# Patient Record
Sex: MALE | Race: OTHER | HISPANIC OR LATINO | URBAN - METROPOLITAN AREA
[De-identification: names, ages, dates, MRNs, and addresses within clinical notes are randomized per-mention and may not be internally consistent; named-entity substitution may affect disease eponyms.]

---

## 2017-06-28 ENCOUNTER — INPATIENT (INPATIENT)
Facility: HOSPITAL | Age: 82
LOS: 6 days | Discharge: HOME | End: 2017-07-05
Attending: SURGERY | Admitting: SURGERY
Payer: MEDICARE

## 2017-06-28 VITALS
SYSTOLIC BLOOD PRESSURE: 123 MMHG | DIASTOLIC BLOOD PRESSURE: 84 MMHG | TEMPERATURE: 98 F | OXYGEN SATURATION: 97 % | RESPIRATION RATE: 16 BRPM | HEART RATE: 61 BPM

## 2017-06-28 DIAGNOSIS — Z98.890 OTHER SPECIFIED POSTPROCEDURAL STATES: Chronic | ICD-10-CM

## 2017-06-28 DIAGNOSIS — K56.69 OTHER INTESTINAL OBSTRUCTION: ICD-10-CM

## 2017-06-28 LAB
ALBUMIN SERPL ELPH-MCNC: 4.8 G/DL — SIGNIFICANT CHANGE UP (ref 3.3–5)
ALP SERPL-CCNC: 53 U/L — SIGNIFICANT CHANGE UP (ref 40–120)
ALT FLD-CCNC: 14 U/L — SIGNIFICANT CHANGE UP (ref 4–41)
APTT BLD: 31.2 SEC — SIGNIFICANT CHANGE UP (ref 27.5–37.4)
AST SERPL-CCNC: 23 U/L — SIGNIFICANT CHANGE UP (ref 4–40)
BASE EXCESS BLDV CALC-SCNC: 5.6 MMOL/L — SIGNIFICANT CHANGE UP
BASE EXCESS BLDV CALC-SCNC: 6.7 MMOL/L — SIGNIFICANT CHANGE UP
BASOPHILS # BLD AUTO: 0.01 K/UL — SIGNIFICANT CHANGE UP (ref 0–0.2)
BASOPHILS NFR BLD AUTO: 0.1 % — SIGNIFICANT CHANGE UP (ref 0–2)
BILIRUB SERPL-MCNC: 1 MG/DL — SIGNIFICANT CHANGE UP (ref 0.2–1.2)
BLD GP AB SCN SERPL QL: NEGATIVE — SIGNIFICANT CHANGE UP
BLOOD GAS VENOUS - CREATININE: 1.07 MG/DL — SIGNIFICANT CHANGE UP (ref 0.5–1.3)
BLOOD GAS VENOUS - CREATININE: 1.11 MG/DL — SIGNIFICANT CHANGE UP (ref 0.5–1.3)
BUN SERPL-MCNC: 44 MG/DL — HIGH (ref 7–23)
CALCIUM SERPL-MCNC: 10.5 MG/DL — SIGNIFICANT CHANGE UP (ref 8.4–10.5)
CHLORIDE BLDV-SCNC: 91 MMOL/L — LOW (ref 96–108)
CHLORIDE BLDV-SCNC: 96 MMOL/L — SIGNIFICANT CHANGE UP (ref 96–108)
CHLORIDE SERPL-SCNC: 86 MMOL/L — LOW (ref 98–107)
CK MB BLD-MCNC: 2.2 — SIGNIFICANT CHANGE UP (ref 0–2.5)
CK MB BLD-MCNC: 5.37 NG/ML — SIGNIFICANT CHANGE UP (ref 1–6.6)
CK SERPL-CCNC: 246 U/L — HIGH (ref 30–200)
CO2 SERPL-SCNC: 26 MMOL/L — SIGNIFICANT CHANGE UP (ref 22–31)
CREAT SERPL-MCNC: 1.49 MG/DL — HIGH (ref 0.5–1.3)
EOSINOPHIL # BLD AUTO: 0 K/UL — SIGNIFICANT CHANGE UP (ref 0–0.5)
EOSINOPHIL NFR BLD AUTO: 0 % — SIGNIFICANT CHANGE UP (ref 0–6)
GAS PNL BLDV: 133 MMOL/L — LOW (ref 136–146)
GAS PNL BLDV: 134 MMOL/L — LOW (ref 136–146)
GLUCOSE BLDV-MCNC: 140 — HIGH (ref 70–99)
GLUCOSE BLDV-MCNC: 153 — HIGH (ref 70–99)
GLUCOSE SERPL-MCNC: 155 MG/DL — HIGH (ref 70–99)
HCO3 BLDV-SCNC: 29 MMOL/L — HIGH (ref 20–27)
HCO3 BLDV-SCNC: 30 MMOL/L — HIGH (ref 20–27)
HCT VFR BLD CALC: 45.3 % — SIGNIFICANT CHANGE UP (ref 39–50)
HCT VFR BLDV CALC: 50.1 % — SIGNIFICANT CHANGE UP (ref 39–51)
HCT VFR BLDV CALC: 54.3 % — HIGH (ref 39–51)
HGB BLD-MCNC: 15.4 G/DL — SIGNIFICANT CHANGE UP (ref 13–17)
HGB BLDV-MCNC: 16.4 G/DL — SIGNIFICANT CHANGE UP (ref 13–17)
HGB BLDV-MCNC: 18.1 G/DL — HIGH (ref 13–17)
IMM GRANULOCYTES # BLD AUTO: 0 # — SIGNIFICANT CHANGE UP
IMM GRANULOCYTES NFR BLD AUTO: 0 % — SIGNIFICANT CHANGE UP (ref 0–1.5)
INR BLD: 1.19 — HIGH (ref 0.88–1.17)
LACTATE BLDV-MCNC: 2.4 MMOL/L — HIGH (ref 0.5–2)
LACTATE BLDV-MCNC: 3.2 MMOL/L — HIGH (ref 0.5–2)
LYMPHOCYTES # BLD AUTO: 1.38 K/UL — SIGNIFICANT CHANGE UP (ref 1–3.3)
LYMPHOCYTES # BLD AUTO: 19.1 % — SIGNIFICANT CHANGE UP (ref 13–44)
MCHC RBC-ENTMCNC: 32.6 PG — SIGNIFICANT CHANGE UP (ref 27–34)
MCHC RBC-ENTMCNC: 34 % — SIGNIFICANT CHANGE UP (ref 32–36)
MCV RBC AUTO: 96 FL — SIGNIFICANT CHANGE UP (ref 80–100)
MONOCYTES # BLD AUTO: 1.39 K/UL — HIGH (ref 0–0.9)
MONOCYTES NFR BLD AUTO: 19.3 % — HIGH (ref 2–14)
NEUTROPHILS # BLD AUTO: 4.44 K/UL — SIGNIFICANT CHANGE UP (ref 1.8–7.4)
NEUTROPHILS NFR BLD AUTO: 61.5 % — SIGNIFICANT CHANGE UP (ref 43–77)
PCO2 BLDV: 44 MMHG — SIGNIFICANT CHANGE UP (ref 41–51)
PCO2 BLDV: 44 MMHG — SIGNIFICANT CHANGE UP (ref 41–51)
PH BLDV: 7.44 PH — HIGH (ref 7.32–7.43)
PH BLDV: 7.46 PH — HIGH (ref 7.32–7.43)
PLATELET # BLD AUTO: 255 K/UL — SIGNIFICANT CHANGE UP (ref 150–400)
PMV BLD: 11.1 FL — SIGNIFICANT CHANGE UP (ref 7–13)
PO2 BLDV: 53 MMHG — HIGH (ref 35–40)
PO2 BLDV: 67 MMHG — HIGH (ref 35–40)
POTASSIUM BLDV-SCNC: 3.5 MMOL/L — SIGNIFICANT CHANGE UP (ref 3.4–4.5)
POTASSIUM BLDV-SCNC: 3.6 MMOL/L — SIGNIFICANT CHANGE UP (ref 3.4–4.5)
POTASSIUM SERPL-MCNC: 3.7 MMOL/L — SIGNIFICANT CHANGE UP (ref 3.5–5.3)
POTASSIUM SERPL-SCNC: 3.7 MMOL/L — SIGNIFICANT CHANGE UP (ref 3.5–5.3)
PROT SERPL-MCNC: 8.7 G/DL — HIGH (ref 6–8.3)
PROTHROM AB SERPL-ACNC: 13.4 SEC — HIGH (ref 9.8–13.1)
RBC # BLD: 4.72 M/UL — SIGNIFICANT CHANGE UP (ref 4.2–5.8)
RBC # FLD: 13.3 % — SIGNIFICANT CHANGE UP (ref 10.3–14.5)
RH IG SCN BLD-IMP: POSITIVE — SIGNIFICANT CHANGE UP
SAO2 % BLDV: 88.3 % — HIGH (ref 60–85)
SAO2 % BLDV: 93.2 % — HIGH (ref 60–85)
SODIUM SERPL-SCNC: 134 MMOL/L — LOW (ref 135–145)
TROPONIN T SERPL-MCNC: < 0.06 NG/ML — SIGNIFICANT CHANGE UP (ref 0–0.06)
WBC # BLD: 7.22 K/UL — SIGNIFICANT CHANGE UP (ref 3.8–10.5)
WBC # FLD AUTO: 7.22 K/UL — SIGNIFICANT CHANGE UP (ref 3.8–10.5)

## 2017-06-28 PROCEDURE — 74177 CT ABD & PELVIS W/CONTRAST: CPT | Mod: 26

## 2017-06-28 PROCEDURE — 71010: CPT | Mod: 26

## 2017-06-28 RX ORDER — SODIUM CHLORIDE 9 MG/ML
1000 INJECTION INTRAMUSCULAR; INTRAVENOUS; SUBCUTANEOUS ONCE
Qty: 0 | Refills: 0 | Status: COMPLETED | OUTPATIENT
Start: 2017-06-28 | End: 2017-06-28

## 2017-06-28 RX ORDER — SODIUM CHLORIDE 9 MG/ML
2000 INJECTION INTRAMUSCULAR; INTRAVENOUS; SUBCUTANEOUS ONCE
Qty: 0 | Refills: 0 | Status: COMPLETED | OUTPATIENT
Start: 2017-06-28 | End: 2017-06-28

## 2017-06-28 RX ORDER — METOCLOPRAMIDE HCL 10 MG
10 TABLET ORAL ONCE
Qty: 0 | Refills: 0 | Status: COMPLETED | OUTPATIENT
Start: 2017-06-28 | End: 2017-06-28

## 2017-06-28 RX ORDER — SODIUM CHLORIDE 9 MG/ML
1000 INJECTION, SOLUTION INTRAVENOUS
Qty: 0 | Refills: 0 | Status: DISCONTINUED | OUTPATIENT
Start: 2017-06-28 | End: 2017-06-30

## 2017-06-28 RX ORDER — HEPARIN SODIUM 5000 [USP'U]/ML
5000 INJECTION INTRAVENOUS; SUBCUTANEOUS EVERY 8 HOURS
Qty: 0 | Refills: 0 | Status: DISCONTINUED | OUTPATIENT
Start: 2017-06-28 | End: 2017-07-05

## 2017-06-28 RX ADMIN — SODIUM CHLORIDE 100 MILLILITER(S): 9 INJECTION, SOLUTION INTRAVENOUS at 18:35

## 2017-06-28 RX ADMIN — SODIUM CHLORIDE 1000 MILLILITER(S): 9 INJECTION INTRAMUSCULAR; INTRAVENOUS; SUBCUTANEOUS at 12:29

## 2017-06-28 RX ADMIN — HEPARIN SODIUM 5000 UNIT(S): 5000 INJECTION INTRAVENOUS; SUBCUTANEOUS at 22:13

## 2017-06-28 RX ADMIN — Medication 10 MILLIGRAM(S): at 11:28

## 2017-06-28 RX ADMIN — SODIUM CHLORIDE 1000 MILLILITER(S): 9 INJECTION INTRAMUSCULAR; INTRAVENOUS; SUBCUTANEOUS at 13:24

## 2017-06-28 NOTE — H&P ADULT - ASSESSMENT
Patient is an 83 year old male with a SBO.  -NPO  -IVFs  -Davis for strict Is and Os  -Placed NGT in ED and 1100 brown fluid drained.  -Serial abdominal exams  -lovenox for VTE px.

## 2017-06-28 NOTE — ED ADULT NURSE NOTE - CHIEF COMPLAINT QUOTE
Pt s/p endoscopy Monday c/o hiccups and vomiting since. Pt reports weakness denies current nausea, denies diarrhea, fever, chest pain, SOB.    Abnormal EKG

## 2017-06-28 NOTE — ED PROVIDER NOTE - ATTENDING CONTRIBUTION TO CARE
awa: hx from pt and wife.  PMh includes barretts esophagus and some type of colon surgery 8 years ago..   routine f/u endoscopy 2 days ago. that night started vomiting. yesterday vomiting green and then brown material. no fever or diarrhea. no sx at present. pt is thirsty but not hungary.   exam: nad. abd unremarkable.   EKG: BBB; old ekg not available for comparison.   recc: hydrate, CT abd/pelvis with oral contrast. labs.

## 2017-06-28 NOTE — ED PROVIDER NOTE - OBJECTIVE STATEMENT
82 y/o male pmh htn, barrets esophagus, pvd c/o hiccups and vomiting x2 days. Pt admits to having an endoscopy x2 days ago and has had hiccups and vomiting since. Pt admit sto greenish vomiting which then progressed to brownish material. Pt admits to minor epigastric pain. Denies chest pain, sob, diaphoresis, diarrhea, dysuria, numbness, tingling, weakness, dizziness, syncope, fever or chills. Pt last x BM 1 day ago 82 y/o male pmh htn, barrets esophagus, pvd c/o hiccups and vomiting x2 days. Pt admits to having an endoscopy x2 days ago and has had hiccups and vomiting since. Pt admit sto greenish vomiting which then progressed to brownish material. Pt admits to minor epigastric pain. Denies chest pain, sob, diaphoresis, diarrhea, dysuria, numbness, tingling, weakness, dizziness, syncope, fever or chills.. Pt last x BM 1 day ago

## 2017-06-28 NOTE — H&P ADULT - NSHPLABSRESULTS_GEN_ALL_CORE
CT A/P:    BOWEL: Status post right hemicolectomy. Markedly distended stomach and   small bowel to the level of a tight transition in the mid abdomen at the   level of umbilicus. Distal small bowel is collapsed. The colon is   collapsed. No pneumatosis.

## 2017-06-28 NOTE — H&P ADULT - HISTORY OF PRESENT ILLNESS
Patient is an 83 year old male with a PMH of Barretts esophagitis, HTN, HLD, and colon CA s/p right hemicolectomy who is presenting with nausea, vomiting, and hiccups since his EGD on Monday.  He had a scheduled EGD with Dr. Hidalgo to evaluate his Barretts on Monday.  Monday evening he started having hiccups and then vomited.  He has been doing both since.  His last BM and flatus were yesterday.  Patient and his family that when he had his last EGD, he had similar symptoms, but they resolved on their own.  His last colonoscopy was approx a year ago and was normal per wife.

## 2017-06-28 NOTE — ED ADULT NURSE NOTE - OBJECTIVE STATEMENT
received pt A&Ox3 in no apparent distress at this time. #20g IVl to L FA, bloods drawn and sent to the lab. no s/s of infiltration noted at this time. family and MD at bedside. pt c/o hiccups at this time. cardiac monitor in place. vss. dispo pending

## 2017-06-28 NOTE — H&P ADULT - NSHPPHYSICALEXAM_GEN_ALL_CORE
PHYSICAL EXAM:    Constitutional:  Patient lying in bed comfortably but hiccupping.    HENMT: NC/AT    Respiratory:  No tachypnea or accessory muscle use.    Cardiovascular:  Regular rate    Gastrointestinal:  Abd is soft, mildly distended, nontender.    Extremities:  Moving all four extremities spontaneously    Neurological:  A&Ox3    Skin:  Midline scar on abd and left inguinal scar.    Lymph Nodes:    Musculoskeletal:    Psychiatric:  Appropriate affect.

## 2017-06-28 NOTE — ED ADULT TRIAGE NOTE - CHIEF COMPLAINT QUOTE
Pt s/p endoscopy Monday c/o hiccups and vomiting since. Pt reports weakness denies current nausea, denies diarrhea, fever, chest pain, SOB. Pt s/p endoscopy Monday c/o hiccups and vomiting since. Pt reports weakness denies current nausea, denies diarrhea, fever, chest pain, SOB.    Abnormal EKG

## 2017-06-29 LAB
ALBUMIN SERPL ELPH-MCNC: 4.3 G/DL — SIGNIFICANT CHANGE UP (ref 3.3–5)
ALP SERPL-CCNC: 45 U/L — SIGNIFICANT CHANGE UP (ref 40–120)
ALT FLD-CCNC: 16 U/L — SIGNIFICANT CHANGE UP (ref 4–41)
AST SERPL-CCNC: 32 U/L — SIGNIFICANT CHANGE UP (ref 4–40)
BILIRUB SERPL-MCNC: 0.9 MG/DL — SIGNIFICANT CHANGE UP (ref 0.2–1.2)
BUN SERPL-MCNC: 35 MG/DL — HIGH (ref 7–23)
CALCIUM SERPL-MCNC: 9.8 MG/DL — SIGNIFICANT CHANGE UP (ref 8.4–10.5)
CHLORIDE SERPL-SCNC: 90 MMOL/L — LOW (ref 98–107)
CO2 SERPL-SCNC: 34 MMOL/L — HIGH (ref 22–31)
CREAT SERPL-MCNC: 1.1 MG/DL — SIGNIFICANT CHANGE UP (ref 0.5–1.3)
GLUCOSE SERPL-MCNC: 123 MG/DL — HIGH (ref 70–99)
HCT VFR BLD CALC: 42.1 % — SIGNIFICANT CHANGE UP (ref 39–50)
HGB BLD-MCNC: 14.7 G/DL — SIGNIFICANT CHANGE UP (ref 13–17)
LACTATE SERPL-SCNC: 1.7 MMOL/L — SIGNIFICANT CHANGE UP (ref 0.5–2)
MCHC RBC-ENTMCNC: 32.8 PG — SIGNIFICANT CHANGE UP (ref 27–34)
MCHC RBC-ENTMCNC: 34.9 % — SIGNIFICANT CHANGE UP (ref 32–36)
MCV RBC AUTO: 94 FL — SIGNIFICANT CHANGE UP (ref 80–100)
NRBC # FLD: 0 — SIGNIFICANT CHANGE UP
PLATELET # BLD AUTO: 199 K/UL — SIGNIFICANT CHANGE UP (ref 150–400)
PMV BLD: 11.2 FL — SIGNIFICANT CHANGE UP (ref 7–13)
POTASSIUM SERPL-MCNC: 3.3 MMOL/L — LOW (ref 3.5–5.3)
POTASSIUM SERPL-SCNC: 3.3 MMOL/L — LOW (ref 3.5–5.3)
PROT SERPL-MCNC: 7.4 G/DL — SIGNIFICANT CHANGE UP (ref 6–8.3)
RBC # BLD: 4.48 M/UL — SIGNIFICANT CHANGE UP (ref 4.2–5.8)
RBC # FLD: 13.2 % — SIGNIFICANT CHANGE UP (ref 10.3–14.5)
SODIUM SERPL-SCNC: 138 MMOL/L — SIGNIFICANT CHANGE UP (ref 135–145)
WBC # BLD: 7.24 K/UL — SIGNIFICANT CHANGE UP (ref 3.8–10.5)
WBC # FLD AUTO: 7.24 K/UL — SIGNIFICANT CHANGE UP (ref 3.8–10.5)

## 2017-06-29 PROCEDURE — 71010: CPT | Mod: 26

## 2017-06-29 RX ORDER — POTASSIUM CHLORIDE 20 MEQ
10 PACKET (EA) ORAL
Qty: 0 | Refills: 0 | Status: COMPLETED | OUTPATIENT
Start: 2017-06-29 | End: 2017-06-29

## 2017-06-29 RX ADMIN — Medication 100 MILLIEQUIVALENT(S): at 09:35

## 2017-06-29 RX ADMIN — SODIUM CHLORIDE 150 MILLILITER(S): 9 INJECTION, SOLUTION INTRAVENOUS at 14:45

## 2017-06-29 RX ADMIN — HEPARIN SODIUM 5000 UNIT(S): 5000 INJECTION INTRAVENOUS; SUBCUTANEOUS at 22:15

## 2017-06-29 RX ADMIN — Medication 100 MILLIEQUIVALENT(S): at 10:32

## 2017-06-29 RX ADMIN — HEPARIN SODIUM 5000 UNIT(S): 5000 INJECTION INTRAVENOUS; SUBCUTANEOUS at 14:55

## 2017-06-29 RX ADMIN — SODIUM CHLORIDE 150 MILLILITER(S): 9 INJECTION, SOLUTION INTRAVENOUS at 12:52

## 2017-06-29 RX ADMIN — SODIUM CHLORIDE 150 MILLILITER(S): 9 INJECTION, SOLUTION INTRAVENOUS at 04:52

## 2017-06-29 RX ADMIN — HEPARIN SODIUM 5000 UNIT(S): 5000 INJECTION INTRAVENOUS; SUBCUTANEOUS at 05:38

## 2017-06-29 RX ADMIN — Medication 100 MILLIEQUIVALENT(S): at 11:42

## 2017-06-29 NOTE — PROGRESS NOTE ADULT - SUBJECTIVE AND OBJECTIVE BOX
Patient is a 83y old  Male who presents with a chief complaint of vomiting (28 Jun 2017 19:39) w/ SBO       SUBJECTIVE: pain controlled, no nausea/vomiting/headache/dizziness/chest pain/shortness of breath. Negative Flatus , Negative Bowel Movement     OBJECTIVE:  PHYSICAL EXAM:  GA: NAD  Abdomen:  -  soft, non-distended, mildly tender        Vitals/Labs:  Vital Signs Last 24 Hrs  T(C): 37.3 (29 Jun 2017 06:34), Max: 37.3 (29 Jun 2017 06:34)  T(F): 99.2 (29 Jun 2017 06:34), Max: 99.2 (29 Jun 2017 06:34)  HR: 91 (29 Jun 2017 06:34) (61 - 98)  BP: 141/58 (29 Jun 2017 06:34) (119/71 - 148/102)  BP(mean): --  RR: 18 (29 Jun 2017 06:34) (16 - 18)  SpO2: 96% (29 Jun 2017 06:34) (95% - 100%)    I&O's Detail                            14.7   7.24  )-----------( 199      ( 29 Jun 2017 05:30 )             42.1       06-29    138  |  90<L>  |  35<H>  ----------------------------<  123<H>  3.3<L>   |  34<H>  |  1.10    Ca    9.8      29 Jun 2017 05:30    TPro  7.4  /  Alb  4.3  /  TBili  0.9  /  DBili  x   /  AST  32  /  ALT  16  /  AlkPhos  45  06-29      CAPILLARY BLOOD GLUCOSE          LIVER FUNCTIONS - ( 29 Jun 2017 05:30 )  Alb: 4.3 g/dL / Pro: 7.4 g/dL / ALK PHOS: 45 u/L / ALT: 16 u/L / AST: 32 u/L / GGT: x             PT/INR - ( 28 Jun 2017 15:39 )   PT: 13.4 SEC;   INR: 1.19          PTT - ( 28 Jun 2017 15:39 )  PTT:31.2 SEC        ASSESSMENT/PLAN: LETY CALDERON 83y Male s/p    - Diet: NPO   -awaiting GI function   - Pain control   - Input and outputs   - DVT ppx  -f/u Lactate level    MEDICATIONS  (STANDING):  lactated ringers. 1000 milliLiter(s) (150 mL/Hr) IV Continuous <Continuous>  heparin  Injectable 5000 Unit(s) SubCutaneous every 8 hours        A team   [id number] Patient is a 83y old  Male who presents with a chief complaint of vomiting (28 Jun 2017 19:39) w/ SBO       SUBJECTIVE: pain controlled, mildly nauseous Pt denies headache/dizziness/chest pain/shortness of breath. Negative Flatus , Negative Bowel Movement     OBJECTIVE:  PHYSICAL EXAM:  GA: NAD  Abdomen:  -  soft, non-distended, mildly tender        Vitals/Labs:  Vital Signs Last 24 Hrs  T(C): 37.3 (29 Jun 2017 06:34), Max: 37.3 (29 Jun 2017 06:34)  T(F): 99.2 (29 Jun 2017 06:34), Max: 99.2 (29 Jun 2017 06:34)  HR: 91 (29 Jun 2017 06:34) (61 - 98)  BP: 141/58 (29 Jun 2017 06:34) (119/71 - 148/102)  BP(mean): --  RR: 18 (29 Jun 2017 06:34) (16 - 18)  SpO2: 96% (29 Jun 2017 06:34) (95% - 100%)    I&O's Detail                            14.7   7.24  )-----------( 199      ( 29 Jun 2017 05:30 )             42.1       06-29    138  |  90<L>  |  35<H>  ----------------------------<  123<H>  3.3<L>   |  34<H>  |  1.10    Ca    9.8      29 Jun 2017 05:30    TPro  7.4  /  Alb  4.3  /  TBili  0.9  /  DBili  x   /  AST  32  /  ALT  16  /  AlkPhos  45  06-29      CAPILLARY BLOOD GLUCOSE          LIVER FUNCTIONS - ( 29 Jun 2017 05:30 )  Alb: 4.3 g/dL / Pro: 7.4 g/dL / ALK PHOS: 45 u/L / ALT: 16 u/L / AST: 32 u/L / GGT: x             PT/INR - ( 28 Jun 2017 15:39 )   PT: 13.4 SEC;   INR: 1.19          PTT - ( 28 Jun 2017 15:39 )  PTT:31.2 SEC        ASSESSMENT/PLAN: LETY CALDERON 83y Male s/p    - Diet: NPO   -awaiting GI function   - Pain control   - Input and outputs   - DVT ppx  -f/u Lactate level    MEDICATIONS  (STANDING):  lactated ringers. 1000 milliLiter(s) (150 mL/Hr) IV Continuous <Continuous>  heparin  Injectable 5000 Unit(s) SubCutaneous every 8 hours        A team   [id number] Patient is a 83y old  Male who presents with a chief complaint of vomiting (28 Jun 2017 19:39) w/ SBO       SUBJECTIVE: pain controlled, mildly nauseous Pt denies headache/dizziness/chest pain/shortness of breath. Negative Flatus , Negative Bowel Movement     OBJECTIVE:  PHYSICAL EXAM:  GA: NAD  Abdomen:  -  soft, non-distended, mildly tender        Vitals/Labs:  Vital Signs Last 24 Hrs  T(C): 37.3 (29 Jun 2017 06:34), Max: 37.3 (29 Jun 2017 06:34)  T(F): 99.2 (29 Jun 2017 06:34), Max: 99.2 (29 Jun 2017 06:34)  HR: 91 (29 Jun 2017 06:34) (61 - 98)  BP: 141/58 (29 Jun 2017 06:34) (119/71 - 148/102)  BP(mean): --  RR: 18 (29 Jun 2017 06:34) (16 - 18)  SpO2: 96% (29 Jun 2017 06:34) (95% - 100%)    I&O's Detail                            14.7   7.24  )-----------( 199      ( 29 Jun 2017 05:30 )             42.1       06-29    138  |  90<L>  |  35<H>  ----------------------------<  123<H>  3.3<L>   |  34<H>  |  1.10    Ca    9.8      29 Jun 2017 05:30    TPro  7.4  /  Alb  4.3  /  TBili  0.9  /  DBili  x   /  AST  32  /  ALT  16  /  AlkPhos  45  06-29      CAPILLARY BLOOD GLUCOSE          LIVER FUNCTIONS - ( 29 Jun 2017 05:30 )  Alb: 4.3 g/dL / Pro: 7.4 g/dL / ALK PHOS: 45 u/L / ALT: 16 u/L / AST: 32 u/L / GGT: x             PT/INR - ( 28 Jun 2017 15:39 )   PT: 13.4 SEC;   INR: 1.19          PTT - ( 28 Jun 2017 15:39 )  PTT:31.2 SEC        ASSESSMENT/PLAN: LETY CALDERON 83y Male s/p    - Diet: NPO   -awaiting GI function   - Pain control   - Input and outputs   - DVT ppx  -f/u Lactate level  - if no improvement may need sb protocol    MEDICATIONS  (STANDING):  lactated ringers. 1000 milliLiter(s) (150 mL/Hr) IV Continuous <Continuous>  heparin  Injectable 5000 Unit(s) SubCutaneous every 8 hours        A team   [id number]

## 2017-06-29 NOTE — ED ADULT NURSE REASSESSMENT NOTE - NS ED NURSE REASSESS COMMENT FT1
Pt admitted to surgery for SBO, maintaining NPO, continuous suction via NG tube on R nare.  handoff given to ANDREI Kebede, room is not ready yet.

## 2017-06-29 NOTE — ED ADULT NURSE REASSESSMENT NOTE - NS ED NURSE REASSESS COMMENT FT1
Pt received at 1200pm, resting on semi-barragan, awake, confused, mumbles incomprehensible words at times.  Continuous cardiac monitoring with stable VS, critical peguero noted, draining blood tinged cloudy urine.  Temp 36.7F, bear hugger stopped.  On fall precaution, pt keep close from nursing station, all clutters removed, bed kept in lowest position.  Admitted to medicine for altered mental status.  Will continue to monitor closely.

## 2017-06-30 DIAGNOSIS — K56.69 OTHER INTESTINAL OBSTRUCTION: ICD-10-CM

## 2017-06-30 LAB
BUN SERPL-MCNC: 37 MG/DL — HIGH (ref 7–23)
CALCIUM SERPL-MCNC: 9.8 MG/DL — SIGNIFICANT CHANGE UP (ref 8.4–10.5)
CHLORIDE SERPL-SCNC: 89 MMOL/L — LOW (ref 98–107)
CO2 SERPL-SCNC: 36 MMOL/L — HIGH (ref 22–31)
CREAT SERPL-MCNC: 1.03 MG/DL — SIGNIFICANT CHANGE UP (ref 0.5–1.3)
GLUCOSE SERPL-MCNC: 109 MG/DL — HIGH (ref 70–99)
HCT VFR BLD CALC: 42.6 % — SIGNIFICANT CHANGE UP (ref 39–50)
HGB BLD-MCNC: 15 G/DL — SIGNIFICANT CHANGE UP (ref 13–17)
MCHC RBC-ENTMCNC: 33 PG — SIGNIFICANT CHANGE UP (ref 27–34)
MCHC RBC-ENTMCNC: 35.2 % — SIGNIFICANT CHANGE UP (ref 32–36)
MCV RBC AUTO: 93.8 FL — SIGNIFICANT CHANGE UP (ref 80–100)
NRBC # FLD: 0 — SIGNIFICANT CHANGE UP
PLATELET # BLD AUTO: 198 K/UL — SIGNIFICANT CHANGE UP (ref 150–400)
PMV BLD: 11.3 FL — SIGNIFICANT CHANGE UP (ref 7–13)
POTASSIUM SERPL-MCNC: 3.2 MMOL/L — LOW (ref 3.5–5.3)
POTASSIUM SERPL-SCNC: 3.2 MMOL/L — LOW (ref 3.5–5.3)
RBC # BLD: 4.54 M/UL — SIGNIFICANT CHANGE UP (ref 4.2–5.8)
RBC # FLD: 13.1 % — SIGNIFICANT CHANGE UP (ref 10.3–14.5)
SODIUM SERPL-SCNC: 142 MMOL/L — SIGNIFICANT CHANGE UP (ref 135–145)
WBC # BLD: 8.47 K/UL — SIGNIFICANT CHANGE UP (ref 3.8–10.5)
WBC # FLD AUTO: 8.47 K/UL — SIGNIFICANT CHANGE UP (ref 3.8–10.5)

## 2017-06-30 PROCEDURE — 71010: CPT | Mod: 26

## 2017-06-30 PROCEDURE — 74250 X-RAY XM SM INT 1CNTRST STD: CPT | Mod: 26

## 2017-06-30 RX ORDER — ATORVASTATIN CALCIUM 80 MG/1
20 TABLET, FILM COATED ORAL AT BEDTIME
Qty: 0 | Refills: 0 | Status: DISCONTINUED | OUTPATIENT
Start: 2017-06-30 | End: 2017-07-05

## 2017-06-30 RX ORDER — POTASSIUM CHLORIDE 20 MEQ
10 PACKET (EA) ORAL
Qty: 0 | Refills: 0 | Status: COMPLETED | OUTPATIENT
Start: 2017-06-30 | End: 2017-06-30

## 2017-06-30 RX ORDER — PANTOPRAZOLE SODIUM 20 MG/1
40 TABLET, DELAYED RELEASE ORAL
Qty: 0 | Refills: 0 | Status: DISCONTINUED | OUTPATIENT
Start: 2017-06-30 | End: 2017-07-05

## 2017-06-30 RX ORDER — SODIUM CHLORIDE 9 MG/ML
1000 INJECTION, SOLUTION INTRAVENOUS
Qty: 0 | Refills: 0 | Status: DISCONTINUED | OUTPATIENT
Start: 2017-06-30 | End: 2017-07-01

## 2017-06-30 RX ADMIN — SODIUM CHLORIDE 150 MILLILITER(S): 9 INJECTION, SOLUTION INTRAVENOUS at 02:45

## 2017-06-30 RX ADMIN — HEPARIN SODIUM 5000 UNIT(S): 5000 INJECTION INTRAVENOUS; SUBCUTANEOUS at 14:12

## 2017-06-30 RX ADMIN — ATORVASTATIN CALCIUM 20 MILLIGRAM(S): 80 TABLET, FILM COATED ORAL at 22:36

## 2017-06-30 RX ADMIN — Medication 100 MILLIEQUIVALENT(S): at 11:13

## 2017-06-30 RX ADMIN — HEPARIN SODIUM 5000 UNIT(S): 5000 INJECTION INTRAVENOUS; SUBCUTANEOUS at 22:36

## 2017-06-30 RX ADMIN — Medication 100 MILLIEQUIVALENT(S): at 12:13

## 2017-06-30 RX ADMIN — PANTOPRAZOLE SODIUM 40 MILLIGRAM(S): 20 TABLET, DELAYED RELEASE ORAL at 22:36

## 2017-06-30 RX ADMIN — HEPARIN SODIUM 5000 UNIT(S): 5000 INJECTION INTRAVENOUS; SUBCUTANEOUS at 05:45

## 2017-06-30 RX ADMIN — Medication 100 MILLIEQUIVALENT(S): at 10:12

## 2017-06-30 NOTE — PROGRESS NOTE ADULT - SUBJECTIVE AND OBJECTIVE BOX
SUBJECTIVE: Patient seen and examined at bedside, patient without complaints. Patient denies pain, denies flatus and bowel movement today.    Vital Signs Last 24 Hrs  T(C): 36.8 (30 Jun 2017 05:50), Max: 37.8 (29 Jun 2017 17:27)  T(F): 98.3 (30 Jun 2017 05:50), Max: 100.1 (29 Jun 2017 17:27)  HR: 90 (30 Jun 2017 05:50) (63 - 117)  BP: 118/78 (30 Jun 2017 05:50) (113/73 - 143/92)  BP(mean): --  RR: 16 (30 Jun 2017 05:50) (16 - 18)  SpO2: 96% (30 Jun 2017 05:50) (96% - 100%)  I&O's Detail    29 Jun 2017 07:01  -  30 Jun 2017 07:00  --------------------------------------------------------  IN:    lactated ringers.: 1950 mL  Total IN: 1950 mL    OUT:    Indwelling Catheter - Urethral: 650 mL    Nasoenteral Tube: 1700 mL  Total OUT: 2350 mL    Total NET: -400 mL    MEDICATIONS  (STANDING):  lactated ringers. 1000 milliLiter(s) (150 mL/Hr) IV Continuous <Continuous>  heparin  Injectable 5000 Unit(s) SubCutaneous every 8 hours    MEDICATIONS  (PRN):    Physical Exam  General: A&Ox3, NAD  Abdominal: soft nontender nondistended    LABS:                        15.0   8.47  )-----------( 198      ( 30 Jun 2017 06:45 )             42.6     06-30    142  |  89<L>  |  37<H>  ----------------------------<  109<H>  3.2<L>   |  36<H>  |  1.03    Ca    9.8      30 Jun 2017 06:40    TPro  7.4  /  Alb  4.3  /  TBili  0.9  /  DBili  x   /  AST  32  /  ALT  16  /  AlkPhos  45  06-29    PT/INR - ( 28 Jun 2017 15:39 )   PT: 13.4 SEC;   INR: 1.19          PTT - ( 28 Jun 2017 15:39 )  PTT:31.2 SEC

## 2017-06-30 NOTE — PROGRESS NOTE ADULT - ASSESSMENT
Patient is a 83y Male with small bowel obstruction awaiting gi function today  -NPO IVF   -NGT  -Monitor ins and outs  -replete electrolytes prn  -oob  -dvt ppx

## 2017-07-01 LAB
BUN SERPL-MCNC: 29 MG/DL — HIGH (ref 7–23)
CALCIUM SERPL-MCNC: 8.5 MG/DL — SIGNIFICANT CHANGE UP (ref 8.4–10.5)
CHLORIDE SERPL-SCNC: 93 MMOL/L — LOW (ref 98–107)
CO2 SERPL-SCNC: 32 MMOL/L — HIGH (ref 22–31)
CREAT SERPL-MCNC: 0.98 MG/DL — SIGNIFICANT CHANGE UP (ref 0.5–1.3)
GLUCOSE SERPL-MCNC: 116 MG/DL — HIGH (ref 70–99)
HCT VFR BLD CALC: 41.2 % — SIGNIFICANT CHANGE UP (ref 39–50)
HGB BLD-MCNC: 13.8 G/DL — SIGNIFICANT CHANGE UP (ref 13–17)
MAGNESIUM SERPL-MCNC: 2.2 MG/DL — SIGNIFICANT CHANGE UP (ref 1.6–2.6)
MCHC RBC-ENTMCNC: 31.8 PG — SIGNIFICANT CHANGE UP (ref 27–34)
MCHC RBC-ENTMCNC: 33.5 % — SIGNIFICANT CHANGE UP (ref 32–36)
MCV RBC AUTO: 94.9 FL — SIGNIFICANT CHANGE UP (ref 80–100)
NRBC # FLD: 0 — SIGNIFICANT CHANGE UP
PHOSPHATE SERPL-MCNC: 2 MG/DL — LOW (ref 2.5–4.5)
PLATELET # BLD AUTO: 207 K/UL — SIGNIFICANT CHANGE UP (ref 150–400)
PMV BLD: 11.5 FL — SIGNIFICANT CHANGE UP (ref 7–13)
POTASSIUM SERPL-MCNC: 3.3 MMOL/L — LOW (ref 3.5–5.3)
POTASSIUM SERPL-SCNC: 3.3 MMOL/L — LOW (ref 3.5–5.3)
RBC # BLD: 4.34 M/UL — SIGNIFICANT CHANGE UP (ref 4.2–5.8)
RBC # FLD: 13.1 % — SIGNIFICANT CHANGE UP (ref 10.3–14.5)
SODIUM SERPL-SCNC: 140 MMOL/L — SIGNIFICANT CHANGE UP (ref 135–145)
WBC # BLD: 8.92 K/UL — SIGNIFICANT CHANGE UP (ref 3.8–10.5)
WBC # FLD AUTO: 8.92 K/UL — SIGNIFICANT CHANGE UP (ref 3.8–10.5)

## 2017-07-01 RX ORDER — POTASSIUM CHLORIDE 20 MEQ
10 PACKET (EA) ORAL
Qty: 0 | Refills: 0 | Status: COMPLETED | OUTPATIENT
Start: 2017-07-01 | End: 2017-07-01

## 2017-07-01 RX ADMIN — Medication 100 MILLIEQUIVALENT(S): at 15:00

## 2017-07-01 RX ADMIN — Medication 63.75 MILLIMOLE(S): at 18:17

## 2017-07-01 RX ADMIN — Medication 100 MILLIEQUIVALENT(S): at 16:56

## 2017-07-01 RX ADMIN — PANTOPRAZOLE SODIUM 40 MILLIGRAM(S): 20 TABLET, DELAYED RELEASE ORAL at 06:32

## 2017-07-01 RX ADMIN — HEPARIN SODIUM 5000 UNIT(S): 5000 INJECTION INTRAVENOUS; SUBCUTANEOUS at 06:32

## 2017-07-01 RX ADMIN — HEPARIN SODIUM 5000 UNIT(S): 5000 INJECTION INTRAVENOUS; SUBCUTANEOUS at 14:59

## 2017-07-01 NOTE — CHART NOTE - NSCHARTNOTEFT_GEN_A_CORE
Called for unwitnessed fall. Per the nurse, the patient was found in the bathroom sitting in front of the toilette with his back to the toilette. The patient stated that he went to the bathroom and felt nauseous and dizzy. He did not lose consciousness but fell on his butt. He did not hit his head. He did not injure any part of his body. When seen, he did not have any pain, injuries, or deficits.

## 2017-07-02 LAB
APPEARANCE UR: SIGNIFICANT CHANGE UP
BACTERIA # UR AUTO: SIGNIFICANT CHANGE UP
BASE EXCESS BLDV CALC-SCNC: 5 MMOL/L — SIGNIFICANT CHANGE UP
BASOPHILS # BLD AUTO: 0.01 K/UL — SIGNIFICANT CHANGE UP (ref 0–0.2)
BASOPHILS NFR BLD AUTO: 0.1 % — SIGNIFICANT CHANGE UP (ref 0–2)
BASOPHILS NFR SPEC: 0 % — SIGNIFICANT CHANGE UP (ref 0–2)
BILIRUB UR-MCNC: NEGATIVE — SIGNIFICANT CHANGE UP
BLOOD GAS VENOUS - CREATININE: 1.12 MG/DL — SIGNIFICANT CHANGE UP (ref 0.5–1.3)
BLOOD UR QL VISUAL: HIGH
BUN SERPL-MCNC: 17 MG/DL — SIGNIFICANT CHANGE UP (ref 7–23)
BUN SERPL-MCNC: 18 MG/DL — SIGNIFICANT CHANGE UP (ref 7–23)
CALCIUM SERPL-MCNC: 7.9 MG/DL — LOW (ref 8.4–10.5)
CALCIUM SERPL-MCNC: 7.9 MG/DL — LOW (ref 8.4–10.5)
CHLORIDE BLDV-SCNC: 99 MMOL/L — SIGNIFICANT CHANGE UP (ref 96–108)
CHLORIDE SERPL-SCNC: 94 MMOL/L — LOW (ref 98–107)
CHLORIDE SERPL-SCNC: 96 MMOL/L — LOW (ref 98–107)
CO2 SERPL-SCNC: 26 MMOL/L — SIGNIFICANT CHANGE UP (ref 22–31)
CO2 SERPL-SCNC: 27 MMOL/L — SIGNIFICANT CHANGE UP (ref 22–31)
COLOR SPEC: SIGNIFICANT CHANGE UP
CREAT SERPL-MCNC: 1.02 MG/DL — SIGNIFICANT CHANGE UP (ref 0.5–1.3)
CREAT SERPL-MCNC: 1.03 MG/DL — SIGNIFICANT CHANGE UP (ref 0.5–1.3)
EOSINOPHIL # BLD AUTO: 0.04 K/UL — SIGNIFICANT CHANGE UP (ref 0–0.5)
EOSINOPHIL NFR BLD AUTO: 0.3 % — SIGNIFICANT CHANGE UP (ref 0–6)
EOSINOPHIL NFR FLD: 0 % — SIGNIFICANT CHANGE UP (ref 0–6)
GAS PNL BLDV: 128 MMOL/L — LOW (ref 136–146)
GLUCOSE BLDV-MCNC: 98 — SIGNIFICANT CHANGE UP (ref 70–99)
GLUCOSE SERPL-MCNC: 100 MG/DL — HIGH (ref 70–99)
GLUCOSE SERPL-MCNC: 101 MG/DL — HIGH (ref 70–99)
GLUCOSE UR-MCNC: NEGATIVE — SIGNIFICANT CHANGE UP
HCO3 BLDV-SCNC: 29 MMOL/L — HIGH (ref 20–27)
HCT VFR BLD CALC: 38.6 % — LOW (ref 39–50)
HCT VFR BLDV CALC: 36 % — LOW (ref 39–51)
HGB BLD-MCNC: 13.4 G/DL — SIGNIFICANT CHANGE UP (ref 13–17)
HGB BLDV-MCNC: 11.7 G/DL — LOW (ref 13–17)
IMM GRANULOCYTES # BLD AUTO: 0.16 # — SIGNIFICANT CHANGE UP
IMM GRANULOCYTES NFR BLD AUTO: 1.4 % — SIGNIFICANT CHANGE UP (ref 0–1.5)
KETONES UR-MCNC: NEGATIVE — SIGNIFICANT CHANGE UP
LACTATE BLDV-MCNC: 1.5 MMOL/L — SIGNIFICANT CHANGE UP (ref 0.5–2)
LEUKOCYTE ESTERASE UR-ACNC: HIGH
LYMPHOCYTES # BLD AUTO: 1.74 K/UL — SIGNIFICANT CHANGE UP (ref 1–3.3)
LYMPHOCYTES # BLD AUTO: 14.8 % — SIGNIFICANT CHANGE UP (ref 13–44)
LYMPHOCYTES NFR SPEC AUTO: 13 % — SIGNIFICANT CHANGE UP (ref 13–44)
MAGNESIUM SERPL-MCNC: 1.7 MG/DL — SIGNIFICANT CHANGE UP (ref 1.6–2.6)
MANUAL SMEAR VERIFICATION: SIGNIFICANT CHANGE UP
MCHC RBC-ENTMCNC: 32.8 PG — SIGNIFICANT CHANGE UP (ref 27–34)
MCHC RBC-ENTMCNC: 34.7 % — SIGNIFICANT CHANGE UP (ref 32–36)
MCV RBC AUTO: 94.6 FL — SIGNIFICANT CHANGE UP (ref 80–100)
MONOCYTES # BLD AUTO: 0.16 K/UL — SIGNIFICANT CHANGE UP (ref 0–0.9)
MONOCYTES NFR BLD AUTO: 1.4 % — LOW (ref 2–14)
MONOCYTES NFR BLD: 1 % — LOW (ref 2–9)
MORPHOLOGY BLD-IMP: NORMAL — SIGNIFICANT CHANGE UP
MUCOUS THREADS # UR AUTO: SIGNIFICANT CHANGE UP
NEUTROPHIL AB SER-ACNC: 78 % — HIGH (ref 43–77)
NEUTROPHILS # BLD AUTO: 9.63 K/UL — HIGH (ref 1.8–7.4)
NEUTROPHILS NFR BLD AUTO: 82 % — HIGH (ref 43–77)
NEUTS BAND # BLD: 8 % — HIGH (ref 0–6)
NITRITE UR-MCNC: POSITIVE — HIGH
NRBC # FLD: 0 — SIGNIFICANT CHANGE UP
PCO2 BLDV: 45 MMHG — SIGNIFICANT CHANGE UP (ref 41–51)
PH BLDV: 7.43 PH — SIGNIFICANT CHANGE UP (ref 7.32–7.43)
PH UR: 7 — SIGNIFICANT CHANGE UP (ref 4.6–8)
PHOSPHATE SERPL-MCNC: 1.9 MG/DL — LOW (ref 2.5–4.5)
PHOSPHATE SERPL-MCNC: 2.3 MG/DL — LOW (ref 2.5–4.5)
PLATELET # BLD AUTO: 179 K/UL — SIGNIFICANT CHANGE UP (ref 150–400)
PLATELET COUNT - ESTIMATE: NORMAL — SIGNIFICANT CHANGE UP
PMV BLD: 10.4 FL — SIGNIFICANT CHANGE UP (ref 7–13)
PO2 BLDV: 80 MMHG — HIGH (ref 35–40)
POTASSIUM BLDV-SCNC: 3 MMOL/L — LOW (ref 3.4–4.5)
POTASSIUM SERPL-MCNC: 3.3 MMOL/L — LOW (ref 3.5–5.3)
POTASSIUM SERPL-MCNC: 3.4 MMOL/L — LOW (ref 3.5–5.3)
POTASSIUM SERPL-SCNC: 3.3 MMOL/L — LOW (ref 3.5–5.3)
POTASSIUM SERPL-SCNC: 3.4 MMOL/L — LOW (ref 3.5–5.3)
PROT UR-MCNC: NEGATIVE — SIGNIFICANT CHANGE UP
RBC # BLD: 4.08 M/UL — LOW (ref 4.2–5.8)
RBC # FLD: 12.8 % — SIGNIFICANT CHANGE UP (ref 10.3–14.5)
RBC CASTS # UR COMP ASSIST: SIGNIFICANT CHANGE UP (ref 0–?)
REVIEW TO FOLLOW: YES — SIGNIFICANT CHANGE UP
SAO2 % BLDV: 96.6 % — HIGH (ref 60–85)
SODIUM SERPL-SCNC: 134 MMOL/L — LOW (ref 135–145)
SODIUM SERPL-SCNC: 135 MMOL/L — SIGNIFICANT CHANGE UP (ref 135–145)
SP GR SPEC: 1.01 — SIGNIFICANT CHANGE UP (ref 1–1.03)
SQUAMOUS # UR AUTO: SIGNIFICANT CHANGE UP
UROBILINOGEN FLD QL: NORMAL E.U. — SIGNIFICANT CHANGE UP (ref 0.1–0.2)
WBC # BLD: 11.74 K/UL — HIGH (ref 3.8–10.5)
WBC # FLD AUTO: 11.74 K/UL — HIGH (ref 3.8–10.5)
WBC CLUMPS #/AREA URNS HPF: PRESENT — HIGH (ref 0–?)
WBC UR QL: HIGH (ref 0–?)

## 2017-07-02 PROCEDURE — 93010 ELECTROCARDIOGRAM REPORT: CPT

## 2017-07-02 PROCEDURE — 71010: CPT | Mod: 26

## 2017-07-02 RX ORDER — SODIUM CHLORIDE 9 MG/ML
1000 INJECTION, SOLUTION INTRAVENOUS ONCE
Qty: 0 | Refills: 0 | Status: COMPLETED | OUTPATIENT
Start: 2017-07-02 | End: 2017-07-02

## 2017-07-02 RX ORDER — SODIUM CHLORIDE 9 MG/ML
1000 INJECTION, SOLUTION INTRAVENOUS
Qty: 0 | Refills: 0 | Status: DISCONTINUED | OUTPATIENT
Start: 2017-07-02 | End: 2017-07-02

## 2017-07-02 RX ORDER — ACETAMINOPHEN 500 MG
650 TABLET ORAL ONCE
Qty: 0 | Refills: 0 | Status: COMPLETED | OUTPATIENT
Start: 2017-07-02 | End: 2017-07-02

## 2017-07-02 RX ORDER — POTASSIUM CHLORIDE 20 MEQ
10 PACKET (EA) ORAL
Qty: 0 | Refills: 0 | Status: COMPLETED | OUTPATIENT
Start: 2017-07-02 | End: 2017-07-02

## 2017-07-02 RX ORDER — CIPROFLOXACIN LACTATE 400MG/40ML
400 VIAL (ML) INTRAVENOUS EVERY 12 HOURS
Qty: 0 | Refills: 0 | Status: DISCONTINUED | OUTPATIENT
Start: 2017-07-02 | End: 2017-07-05

## 2017-07-02 RX ADMIN — Medication 100 MILLIEQUIVALENT(S): at 13:43

## 2017-07-02 RX ADMIN — HEPARIN SODIUM 5000 UNIT(S): 5000 INJECTION INTRAVENOUS; SUBCUTANEOUS at 05:29

## 2017-07-02 RX ADMIN — Medication 650 MILLIGRAM(S): at 09:56

## 2017-07-02 RX ADMIN — Medication 200 MILLIGRAM(S): at 19:51

## 2017-07-02 RX ADMIN — HEPARIN SODIUM 5000 UNIT(S): 5000 INJECTION INTRAVENOUS; SUBCUTANEOUS at 13:08

## 2017-07-02 RX ADMIN — Medication 63.75 MILLIMOLE(S): at 15:46

## 2017-07-02 RX ADMIN — Medication 100 MILLIEQUIVALENT(S): at 14:42

## 2017-07-02 RX ADMIN — ATORVASTATIN CALCIUM 20 MILLIGRAM(S): 80 TABLET, FILM COATED ORAL at 00:41

## 2017-07-02 RX ADMIN — HEPARIN SODIUM 5000 UNIT(S): 5000 INJECTION INTRAVENOUS; SUBCUTANEOUS at 22:23

## 2017-07-02 RX ADMIN — Medication 100 MILLIEQUIVALENT(S): at 12:43

## 2017-07-02 RX ADMIN — HEPARIN SODIUM 5000 UNIT(S): 5000 INJECTION INTRAVENOUS; SUBCUTANEOUS at 00:40

## 2017-07-02 RX ADMIN — SODIUM CHLORIDE 1000 MILLILITER(S): 9 INJECTION, SOLUTION INTRAVENOUS at 11:17

## 2017-07-02 RX ADMIN — ATORVASTATIN CALCIUM 20 MILLIGRAM(S): 80 TABLET, FILM COATED ORAL at 22:23

## 2017-07-02 RX ADMIN — SODIUM CHLORIDE 100 MILLILITER(S): 9 INJECTION, SOLUTION INTRAVENOUS at 13:07

## 2017-07-02 RX ADMIN — PANTOPRAZOLE SODIUM 40 MILLIGRAM(S): 20 TABLET, DELAYED RELEASE ORAL at 08:24

## 2017-07-02 NOTE — PROVIDER CONTACT NOTE (OTHER) - REASON
Patient found on the floor in the bathroom
pt with shivers and fever
patient pulled out NG tube accidently

## 2017-07-02 NOTE — PROVIDER CONTACT NOTE (OTHER) - SITUATION
Patient found on the floor in the bathroom.
Pt noted with shivers temp 102. Dr Henry made aware.
patient pulled out NG tube accidently

## 2017-07-02 NOTE — PROVIDER CONTACT NOTE (OTHER) - BACKGROUND
Patient admitted with specified intestinal obstruction   H/o HTN
Walked pt to the bathroom and asked to pull red call bell when done.Stayed outside the bathroom in the pt's room.Patient did not use call the bell, stating"I tried to get up and slid on the floor."

## 2017-07-02 NOTE — PROGRESS NOTE ADULT - ASSESSMENT
Patient admitted for adhesive SBO for 5 days, has been improving, became febrile and tachycardiac this morning around 9:30.     Plan:  ·	CBC with diff (WBC 11.74, neutrophil 9.63)  ·	BMP, VBG with lactate  ·	Will check and replete K, Mg, Phos  ·	Maintanence fluid with 1 bolus LR (1L) since patient was slightly hypotensive and tachycardiac after the shaking episode  ·	Will monitor labs  ·	Back to NPO for now

## 2017-07-02 NOTE — PROVIDER CONTACT NOTE (OTHER) - ACTION/TREATMENT ORDERED:
Tylenol 650mg po given, labs sent. See floe sheet for VS.
MD evaluated the patient, no intervention were ordered. Occurrence report was completed. Pt was re-educated on the use of the call bell. Bed alarm in place. Frequent rounds will be done.
Will come and replace NG tube.

## 2017-07-02 NOTE — PROGRESS NOTE ADULT - SUBJECTIVE AND OBJECTIVE BOX
Patient admitted for adhesive SBO for 5 days     Vital Signs Last 24 Hrs  T(C): 36.8 (02 Jul 2017 11:19), Max: 38.9 (02 Jul 2017 09:30)  T(F): 98.2 (02 Jul 2017 11:19), Max: 102 (02 Jul 2017 09:30)  HR: 101 (02 Jul 2017 11:19) (71 - 140)  BP: 91/61 (02 Jul 2017 11:19) (91/61 - 150/80)  BP(mean): --  RR: 18 (02 Jul 2017 11:19) (18 - 20)  SpO2: 99% (02 Jul 2017 11:19) (91% - 99%)      SUBJECTIVE: Patient fell last night without LoC but denied hitting his head or having any traumatic injury. He was doing well when seen during round this morning with no acute complaint. Around 9:30am, patient was having shaking. Patient was immediately assessed and the VS at the time was: , 150/80, O2 sat 91% on room air. Oxygen was given immediately via nasal canula. Patient was shaking but verbal, reponsive, alert and oriented. Patient denies chest pain, abdominal pian, shortness of breath, nausea, vomiting, and/or choking. Patient was given 650 mg tylenol, EKG was done and revealed sinus tachycardia with no acute changes. Chest x-ray was clear in bilateral lung fields and there was no abdominal free air.     SOB:  [ ] YES [X] NO  Dyspnea: [ ]YES [X]NO  Chest Discomfort: [ ] YES [X] NO    Nausea: [ ] YES [X] NO           Vomiting: [ ] YES [X] NO  Flatus: [X] YES [ ] NO             Bowel Movement: [X] YES [ ] NO  Diarrhea: [ ] YES [X] NO         Void: [X]YES [ ]No  Constipation: [ ] YES [X] NO     Pain (0-10): patient denies pain               Pain Control Adequate: [X] YES [ ] NO    PHYSICAL EXAM:  Constitutional: patient was shaking but verbal, responsive, oriented and alert.   Respiratory:  Lungs CTA, noisy breathing possible rale on the right lower lung field  Cardiovascular:  S1, S2, RRR  Gastrointestinal: Abdomen soft, non distended, + BS, non tenderness  Extremities:  No edema, no calf tenderrness,  Neurological: AxAxOx3    I&O's Summary    01 Jul 2017 07:01  -  02 Jul 2017 07:00  --------------------------------------------------------  IN: 1440 mL / OUT: 1125 mL / NET: 315 mL      I&O's Detail    01 Jul 2017 07:01  -  02 Jul 2017 07:00  --------------------------------------------------------  IN:    dextrose 5% + sodium chloride 0.45%: 500 mL    IV PiggyBack: 100 mL    Oral Fluid: 840 mL  Total IN: 1440 mL    OUT:    Voided: 1125 mL  Total OUT: 1125 mL    Total NET: 315 mL          MEDICATIONS  (STANDING):  heparin  Injectable 5000 Unit(s) SubCutaneous every 8 hours  atorvastatin 20 milliGRAM(s) Oral at bedtime  pantoprazole    Tablet 40 milliGRAM(s) Oral before breakfast    MEDICATIONS  (PRN):      LABS:                        13.4   11.74 )-----------( 179      ( 02 Jul 2017 10:00 )             38.6     07-02    135  |  94<L>  |  18  ----------------------------<  100<H>  3.4<L>   |  27  |  1.02    Ca    7.9<L>      02 Jul 2017 06:34  Phos  2.3     07-02  Mg     2.2     07-01            RADIOLOGY & ADDITIONAL STUDIES:

## 2017-07-03 DIAGNOSIS — T83.511S INFECTION AND INFLAMMATORY REACTION DUE TO INDWELLING URETHRAL CATHETER, SEQUELA: ICD-10-CM

## 2017-07-03 LAB
ALBUMIN SERPL ELPH-MCNC: 2.8 G/DL — LOW (ref 3.3–5)
ALP SERPL-CCNC: 49 U/L — SIGNIFICANT CHANGE UP (ref 40–120)
ALT FLD-CCNC: 14 U/L — SIGNIFICANT CHANGE UP (ref 4–41)
AST SERPL-CCNC: 26 U/L — SIGNIFICANT CHANGE UP (ref 4–40)
BILIRUB SERPL-MCNC: 0.5 MG/DL — SIGNIFICANT CHANGE UP (ref 0.2–1.2)
BUN SERPL-MCNC: 14 MG/DL — SIGNIFICANT CHANGE UP (ref 7–23)
CALCIUM SERPL-MCNC: 8.1 MG/DL — LOW (ref 8.4–10.5)
CHLORIDE SERPL-SCNC: 96 MMOL/L — LOW (ref 98–107)
CO2 SERPL-SCNC: 25 MMOL/L — SIGNIFICANT CHANGE UP (ref 22–31)
CREAT SERPL-MCNC: 0.9 MG/DL — SIGNIFICANT CHANGE UP (ref 0.5–1.3)
GLUCOSE SERPL-MCNC: 118 MG/DL — HIGH (ref 70–99)
HCT VFR BLD CALC: 34.2 % — LOW (ref 39–50)
HGB BLD-MCNC: 11.8 G/DL — LOW (ref 13–17)
MCHC RBC-ENTMCNC: 32.3 PG — SIGNIFICANT CHANGE UP (ref 27–34)
MCHC RBC-ENTMCNC: 34.5 % — SIGNIFICANT CHANGE UP (ref 32–36)
MCV RBC AUTO: 93.7 FL — SIGNIFICANT CHANGE UP (ref 80–100)
NRBC # FLD: 0 — SIGNIFICANT CHANGE UP
PLATELET # BLD AUTO: 151 K/UL — SIGNIFICANT CHANGE UP (ref 150–400)
PMV BLD: 10.8 FL — SIGNIFICANT CHANGE UP (ref 7–13)
POTASSIUM SERPL-MCNC: 3.3 MMOL/L — LOW (ref 3.5–5.3)
POTASSIUM SERPL-SCNC: 3.3 MMOL/L — LOW (ref 3.5–5.3)
PROT SERPL-MCNC: 5.4 G/DL — LOW (ref 6–8.3)
RBC # BLD: 3.65 M/UL — LOW (ref 4.2–5.8)
RBC # FLD: 13 % — SIGNIFICANT CHANGE UP (ref 10.3–14.5)
SODIUM SERPL-SCNC: 135 MMOL/L — SIGNIFICANT CHANGE UP (ref 135–145)
SPECIMEN SOURCE: SIGNIFICANT CHANGE UP
WBC # BLD: 17.09 K/UL — HIGH (ref 3.8–10.5)
WBC # FLD AUTO: 17.09 K/UL — HIGH (ref 3.8–10.5)

## 2017-07-03 RX ORDER — POTASSIUM CHLORIDE 20 MEQ
40 PACKET (EA) ORAL ONCE
Qty: 0 | Refills: 0 | Status: COMPLETED | OUTPATIENT
Start: 2017-07-03 | End: 2017-07-03

## 2017-07-03 RX ORDER — POTASSIUM CHLORIDE 20 MEQ
10 PACKET (EA) ORAL
Qty: 0 | Refills: 0 | Status: DISCONTINUED | OUTPATIENT
Start: 2017-07-03 | End: 2017-07-03

## 2017-07-03 RX ADMIN — ATORVASTATIN CALCIUM 20 MILLIGRAM(S): 80 TABLET, FILM COATED ORAL at 21:46

## 2017-07-03 RX ADMIN — Medication 40 MILLIEQUIVALENT(S): at 14:27

## 2017-07-03 RX ADMIN — Medication 200 MILLIGRAM(S): at 18:24

## 2017-07-03 RX ADMIN — PANTOPRAZOLE SODIUM 40 MILLIGRAM(S): 20 TABLET, DELAYED RELEASE ORAL at 06:25

## 2017-07-03 RX ADMIN — HEPARIN SODIUM 5000 UNIT(S): 5000 INJECTION INTRAVENOUS; SUBCUTANEOUS at 14:27

## 2017-07-03 RX ADMIN — HEPARIN SODIUM 5000 UNIT(S): 5000 INJECTION INTRAVENOUS; SUBCUTANEOUS at 05:53

## 2017-07-03 RX ADMIN — Medication 200 MILLIGRAM(S): at 05:53

## 2017-07-03 RX ADMIN — HEPARIN SODIUM 5000 UNIT(S): 5000 INJECTION INTRAVENOUS; SUBCUTANEOUS at 21:47

## 2017-07-03 NOTE — PROGRESS NOTE ADULT - PROBLEM SELECTOR PLAN 1
- D/C fluid  - replete potassium (3.3) and phosphate (2.0), check BMP + Mg + Phos AM  - Continue CLD  - OOB
- Cont diet  - Monitor GI function
- NPO  - cont NGT  - Cont peguero to monitor urine output  - Small bowel protocol with Abd xray at 3:00

## 2017-07-03 NOTE — PROGRESS NOTE ADULT - SUBJECTIVE AND OBJECTIVE BOX
Patient is a 83y old  Male who presents with a chief complaint of vomiting (2017 19:39)      SUBJECTIVE: pain controlled, no nausea/vomiting/headache/dizziness/chest pain/shortness of breath    OBJECTIVE:  PHYSICAL EXAM:  GA: NAD  Abdomen:  -  soft, non-distended, appropriate incisional tenderness  - Incision: clean/dry/intact   - Drain:     Vitals/Labs:  Vital Signs Last 24 Hrs  T(C): 37 (2017 05:48), Max: 38.9 (2017 09:30)  T(F): 98.6 (2017 05:48), Max: 102 (2017 09:30)  HR: 94 (2017 05:48) (73 - 140)  BP: 118/82 (2017 05:48) (90/55 - 150/80)  BP(mean): --  RR: 18 (2017 05:48) (17 - 20)  SpO2: 98% (2017 05:48) (91% - 100%)    I&O's Detail    2017 07:01  -  2017 07:00  --------------------------------------------------------  IN:    IV PiggyBack: 250 mL    Lactated Ringers IV Bolus: 1000 mL    lactated ringers.: 700 mL    Oral Fluid: 380 mL  Total IN: 2330 mL    OUT:    Voided: 1100 mL  Total OUT: 1100 mL    Total NET: 1230 mL                                11.8   17.09 )-----------( 151      ( 2017 07:10 )             34.2       07-03    135  |  96<L>  |  14  ----------------------------<  118<H>  3.3<L>   |  25  |  0.90    Ca    8.1<L>      2017 07:10  Phos  1.9     07-02  Mg     1.7     07-02    TPro  5.4<L>  /  Alb  2.8<L>  /  TBili  0.5  /  DBili  x   /  AST  26  /  ALT  14  /  AlkPhos  49  07-03      CAPILLARY BLOOD GLUCOSE          LIVER FUNCTIONS - ( 2017 07:10 )  Alb: 2.8 g/dL / Pro: 5.4 g/dL / ALK PHOS: 49 u/L / ALT: 14 u/L / AST: 26 u/L / GGT: x                 Urinalysis Basic - ( 2017 13:30 )    Color: PLYEL / Appearance: HAZY / S.006 / pH: 7.0  Gluc: NEGATIVE / Ketone: NEGATIVE  / Bili: NEGATIVE / Urobili: NORMAL E.U.   Blood: SMALL / Protein: NEGATIVE / Nitrite: POSITIVE   Leuk Esterase: LARGE / RBC: 2-5 / WBC 5-10   Sq Epi: OCC / Non Sq Epi: x / Bacteria: FEW        ASSESSMENT/PLAN: LETY CALDERON 83y Male w/ SBO  now resolved   - Diet: Low fiber   - Pain control   - Input and outputs   - DVT ppx  - OOB/incentive spirometry   -f/u Urine Culture   -continue ABX regimen   -Pt Consult      MEDICATIONS  (STANDING):  heparin  Injectable 5000 Unit(s) SubCutaneous every 8 hours  atorvastatin 20 milliGRAM(s) Oral at bedtime  pantoprazole    Tablet 40 milliGRAM(s) Oral before breakfast  ciprofloxacin   IVPB 400 milliGRAM(s) IV Intermittent every 12 hours    MEDICATIONS  (PRN):

## 2017-07-03 NOTE — PHYSICAL THERAPY INITIAL EVALUATION ADULT - LIVES WITH, PROFILE
Lives with wife in house with no stairs to enter./significant other significant other/Lives with wife in apartment with elevator

## 2017-07-03 NOTE — PROGRESS NOTE ADULT - PROBLEM SELECTOR PROBLEM 2
Urinary tract infection associated with catheterization of urinary tract, unspecified indwelling urinary catheter type, sequela

## 2017-07-03 NOTE — PHYSICAL THERAPY INITIAL EVALUATION ADULT - ACTIVE RANGE OF MOTION EXAMINATION, REHAB EVAL
no Active ROM deficits were identified no Active ROM deficits were identified/bilateral upper extremity Active ROM was WFL (within functional limits)/bilateral  lower extremity Active ROM was WFL (within functional limits)

## 2017-07-03 NOTE — INPATIENT CERTIFICATION FOR MEDICARE PATIENTS - RISKS OF ADVERSE EVENTS
Concern for delay in diagnosis and treatment/Concern for cardiopulmonary deterioration/Concern for worsening infectious process/Concern for neurologic deterioration

## 2017-07-03 NOTE — PROGRESS NOTE ADULT - SUBJECTIVE AND OBJECTIVE BOX
INTERVAL HPI/OVERNIGHT EVENTS: Feeling better. Afebrile. No Nausea or vomiting. Tolerating diet    MEDICATIONS  (STANDING):  heparin  Injectable 5000 Unit(s) SubCutaneous every 8 hours  atorvastatin 20 milliGRAM(s) Oral at bedtime  pantoprazole    Tablet 40 milliGRAM(s) Oral before breakfast  ciprofloxacin   IVPB 400 milliGRAM(s) IV Intermittent every 12 hours    MEDICATIONS  (PRN):      Vital Signs Last 24 Hrs  T(C): 37 (2017 05:48), Max: 38.3 (2017 10:18)  T(F): 98.6 (2017 05:48), Max: 100.9 (2017 10:18)  HR: 94 (2017 05:48) (73 - 125)  BP: 118/82 (2017 05:48) (90/55 - 125/84)  BP(mean): --  RR: 18 (2017 05:48) (17 - 19)  SpO2: 98% (2017 05:48) (96% - 100%)    PHYSICAL EXAM:      Constitutional: AOx3, NAD    Gastrointestinal: Abd soft, NT, improved distention      I&O's Detail    2017 07:01  -  2017 07:00  --------------------------------------------------------  IN:    IV PiggyBack: 250 mL    Lactated Ringers IV Bolus: 1000 mL    lactated ringers.: 700 mL    Oral Fluid: 380 mL  Total IN: 2330 mL    OUT:    Voided: 1100 mL  Total OUT: 1100 mL    Total NET: 1230 mL          LABS:                        11.8   17.09 )-----------( 151      ( 2017 07:10 )             34.2     07-03    135  |  96<L>  |  14  ----------------------------<  118<H>  3.3<L>   |  25  |  0.90    Ca    8.1<L>      2017 07:10  Phos  1.9     07-02  Mg     1.7     07-02    TPro  5.4<L>  /  Alb  2.8<L>  /  TBili  0.5  /  DBili  x   /  AST  26  /  ALT  14  /  AlkPhos  49  07-03      Urinalysis Basic - ( 2017 13:30 )    Color: PLYEL / Appearance: HAZY / S.006 / pH: 7.0  Gluc: NEGATIVE / Ketone: NEGATIVE  / Bili: NEGATIVE / Urobili: NORMAL E.U.   Blood: SMALL / Protein: NEGATIVE / Nitrite: POSITIVE   Leuk Esterase: LARGE / RBC: 2-5 / WBC 5-10   Sq Epi: OCC / Non Sq Epi: x / Bacteria: FEW        RADIOLOGY & ADDITIONAL STUDIES:

## 2017-07-03 NOTE — PHYSICAL THERAPY INITIAL EVALUATION ADULT - PERTINENT HX OF CURRENT PROBLEM, REHAB EVAL
N/V s/p endoscopy 83 year old male with a PMH of Barretts esophagitis, HTN, HLD, and colon CA s/p right hemicolectomy who is presenting with nausea, vomiting, and hiccups since his EGD

## 2017-07-04 LAB
-  AMIKACIN: SIGNIFICANT CHANGE UP
-  AMPICILLIN/SULBACTAM: SIGNIFICANT CHANGE UP
-  AMPICILLIN: SIGNIFICANT CHANGE UP
-  AZTREONAM: SIGNIFICANT CHANGE UP
-  CEFAZOLIN: SIGNIFICANT CHANGE UP
-  CEFEPIME: SIGNIFICANT CHANGE UP
-  CEFOXITIN: SIGNIFICANT CHANGE UP
-  CEFTAZIDIME: SIGNIFICANT CHANGE UP
-  CEFTRIAXONE: SIGNIFICANT CHANGE UP
-  CIPROFLOXACIN: SIGNIFICANT CHANGE UP
-  ERTAPENEM: SIGNIFICANT CHANGE UP
-  GENTAMICIN: SIGNIFICANT CHANGE UP
-  IMIPENEM: SIGNIFICANT CHANGE UP
-  LEVOFLOXACIN: SIGNIFICANT CHANGE UP
-  MEROPENEM: SIGNIFICANT CHANGE UP
-  NITROFURANTOIN: SIGNIFICANT CHANGE UP
-  PIPERACILLIN/TAZOBACTAM: SIGNIFICANT CHANGE UP
-  TIGECYCLINE: SIGNIFICANT CHANGE UP
-  TOBRAMYCIN: SIGNIFICANT CHANGE UP
-  TRIMETHOPRIM/SULFAMETHOXAZOLE: SIGNIFICANT CHANGE UP
BACTERIA UR CULT: SIGNIFICANT CHANGE UP
BUN SERPL-MCNC: 16 MG/DL — SIGNIFICANT CHANGE UP (ref 7–23)
CALCIUM SERPL-MCNC: 8.4 MG/DL — SIGNIFICANT CHANGE UP (ref 8.4–10.5)
CHLORIDE SERPL-SCNC: 98 MMOL/L — SIGNIFICANT CHANGE UP (ref 98–107)
CO2 SERPL-SCNC: 24 MMOL/L — SIGNIFICANT CHANGE UP (ref 22–31)
CREAT SERPL-MCNC: 0.95 MG/DL — SIGNIFICANT CHANGE UP (ref 0.5–1.3)
GLUCOSE SERPL-MCNC: 105 MG/DL — HIGH (ref 70–99)
HCT VFR BLD CALC: 33.3 % — LOW (ref 39–50)
HGB BLD-MCNC: 11.3 G/DL — LOW (ref 13–17)
MCHC RBC-ENTMCNC: 32.2 PG — SIGNIFICANT CHANGE UP (ref 27–34)
MCHC RBC-ENTMCNC: 33.9 % — SIGNIFICANT CHANGE UP (ref 32–36)
MCV RBC AUTO: 94.9 FL — SIGNIFICANT CHANGE UP (ref 80–100)
METHOD TYPE: SIGNIFICANT CHANGE UP
NRBC # FLD: 0 — SIGNIFICANT CHANGE UP
ORGANISM # SPEC MICROSCOPIC CNT: SIGNIFICANT CHANGE UP
ORGANISM # SPEC MICROSCOPIC CNT: SIGNIFICANT CHANGE UP
PLATELET # BLD AUTO: 174 K/UL — SIGNIFICANT CHANGE UP (ref 150–400)
PMV BLD: 10.6 FL — SIGNIFICANT CHANGE UP (ref 7–13)
POTASSIUM SERPL-MCNC: 3.5 MMOL/L — SIGNIFICANT CHANGE UP (ref 3.5–5.3)
POTASSIUM SERPL-SCNC: 3.5 MMOL/L — SIGNIFICANT CHANGE UP (ref 3.5–5.3)
RBC # BLD: 3.51 M/UL — LOW (ref 4.2–5.8)
RBC # FLD: 13.2 % — SIGNIFICANT CHANGE UP (ref 10.3–14.5)
SODIUM SERPL-SCNC: 136 MMOL/L — SIGNIFICANT CHANGE UP (ref 135–145)
WBC # BLD: 14.37 K/UL — HIGH (ref 3.8–10.5)
WBC # FLD AUTO: 14.37 K/UL — HIGH (ref 3.8–10.5)

## 2017-07-04 RX ORDER — CIPROFLOXACIN LACTATE 400MG/40ML
40 VIAL (ML) INTRAVENOUS
Qty: 0 | Refills: 0 | COMMUNITY
Start: 2017-07-04

## 2017-07-04 RX ADMIN — HEPARIN SODIUM 5000 UNIT(S): 5000 INJECTION INTRAVENOUS; SUBCUTANEOUS at 05:53

## 2017-07-04 RX ADMIN — HEPARIN SODIUM 5000 UNIT(S): 5000 INJECTION INTRAVENOUS; SUBCUTANEOUS at 13:41

## 2017-07-04 RX ADMIN — Medication 200 MILLIGRAM(S): at 17:57

## 2017-07-04 RX ADMIN — ATORVASTATIN CALCIUM 20 MILLIGRAM(S): 80 TABLET, FILM COATED ORAL at 21:26

## 2017-07-04 RX ADMIN — PANTOPRAZOLE SODIUM 40 MILLIGRAM(S): 20 TABLET, DELAYED RELEASE ORAL at 06:30

## 2017-07-04 RX ADMIN — HEPARIN SODIUM 5000 UNIT(S): 5000 INJECTION INTRAVENOUS; SUBCUTANEOUS at 21:26

## 2017-07-04 RX ADMIN — Medication 200 MILLIGRAM(S): at 05:53

## 2017-07-04 NOTE — PROGRESS NOTE ADULT - SUBJECTIVE AND OBJECTIVE BOX
Patient is admitted for post adhesive SOB 5 days ago.     SUBJECTIVE: Pt seen and examined at bedside. No overnight events. Patient comfortable and in no-apparent distress. No nausea, vomiting, or diarrhea.   - Passing gas  - Passing stool   - Pain controlled   - UTI controlled : WBC is 14.3 today in compare to  yesterday (17.09 )    Pain: [ ] YES [x] NO  Pain Control Adequate: [ ] YES [x ] NO  SOB: [ ]YES [x ] NO  Chest Discomfort: [ ] YES [x ] NO    Nausea: [ ] YES [x ] NO           Vomiting: [ ] YES x[ ] NO  Flatus: [x ] YES [ ] NO             Bowel Movement: [x ] YES [ ] NO     Void: [x ]YES [ ]No    Vital Signs Last 24 Hrs  T(C): 36.4 (2017 05:49), Max: 37.1 (2017 22:42)  T(F): 97.5 (2017 05:49), Max: 98.8 (2017 02:26)  HR: 88 (2017 05:49) (76 - 89)  BP: 123/89 (2017 05:49) (111/66 - 123/89)  BP(mean): --  RR: 17 (2017 05:49) (16 - 18)  SpO2: 98% (2017 05:49) (96% - 99%)    Physical Exam:  General Appearance: Appears well, NAD  Neck: Supple  Chest: Equal expansion bilaterally, equal breath sounds  CV: Pulse regular presently  Abdomen: Soft, nontense, non distended  Extremities: Grossly symmetric    LABS:                        11.3   14.37 )-----------( 174      ( 2017 06:25 )             33.3     07-04    136  |  98  |  16  ----------------------------<  105<H>  3.5   |  24  |  0.95    Ca    8.4      2017 06:25  Phos  1.9     07-02  Mg     1.7     07-02    TPro  5.4<L>  /  Alb  2.8<L>  /  TBili  0.5  /  DBili  x   /  AST  26  /  ALT  14  /  AlkPhos  49  07-03      Urinalysis Basic - ( 2017 13:30 )    Color: PLYEL / Appearance: HAZY / S.006 / pH: 7.0  Gluc: NEGATIVE / Ketone: NEGATIVE  / Bili: NEGATIVE / Urobili: NORMAL E.U.   Blood: SMALL / Protein: NEGATIVE / Nitrite: POSITIVE   Leuk Esterase: LARGE / RBC: 2-5 / WBC 5-10   Sq Epi: OCC / Non Sq Epi: x / Bacteria: FEW        INs and OUTs:    17 @ 07:01  -  17 @ 07:00  --------------------------------------------------------  IN: 732 mL / OUT: 1400 mL / NET: -668 mL    Assessment and plan:  Patient admitted for adhesive SBO for 5 days, has been improving, became afebrile and UTI is controlled.  - F/u Urine culture sensitivities   - Ciprofloxacin will be continued  - Probably will be discharged   - Home PT will be advised.

## 2017-07-04 NOTE — DISCHARGE NOTE ADULT - PLAN OF CARE
Please follow up with Dr. aSnz within 1-2 weeks after discharge from the hospital. You may call [phone] to schedule an appointment. Return of normal GI function. please follow up with your Primary Care Doctor , follow up appt needed Please follow up with Dr. Sanz within 1-2 weeks after discharge from the hospital. You may call [phone] to schedule an appointment.

## 2017-07-04 NOTE — DISCHARGE NOTE ADULT - HOSPITAL COURSE
Patient discussed on morning rounds with Dr. Sanz.    SUBJECTIVE ASSESSMENT: Patient is a 83y old Male with an intestinal obstruction.    Hospital Course:  Patient is an 83 year old male with a PMH of Barretts esophagitis, HTN, HLD, and colon CA s/p right hemicolectomy who is presented with nausea, vomiting, and hiccups since his EGD on 6/26/17.  He had a scheduled EGD with Dr. Hidalgo to evaluate his Barretts on 6/26/17.  6/26/17 evening he started having hiccups and then vomited.  He has been doing both since.  His last BM and flatus were yesterday.  Patient and his family that when he had his last EGD, he had similar symptoms, but they resolved on their own.  His last colonoscopy was approx a year ago and was normal per wife.  Patient was made NPO, given fluids, an NG-tube placed, received a CT scan of the abdomen, and admitted to Surgery for further monitoring. The CT scan showed, "IMPRESSION: Small bowel obstruction with transition at the level of the umbilicus. Collapsed distal small bowel and colon."   While under the care of the surgery team, the patient's prior medical conditions were treated with his home regimen. While in hospital he had an unwitnessed fall in the bathroom, which he sustained no injuries from. He did not lose consciousness prior, during, or after this event. The following day he was found to have a positive urinalysis for a urine tract infection which we treated with a 7 day regimen of Ciprofloxacin.   The patient's GI status improved and the NG tube was removed. He was advance to a regular diet and is ambulating. During the hospital course his vital signs were stable. DVT prophylaxis was given with subcutaneous heparin.    Vital Signs Last 24 Hrs  T(C): 36.8 (04 Jul 2017 13:39), Max: 37.1 (03 Jul 2017 22:42)  T(F): 98.3 (04 Jul 2017 13:39), Max: 98.8 (04 Jul 2017 02:26)  HR: 104 (04 Jul 2017 13:39) (82 - 104)  BP: 113/77 (04 Jul 2017 13:39) (111/66 - 136/75)  BP(mean): --  RR: 18 (04 Jul 2017 13:39) (16 - 18)  SpO2: 100% (04 Jul 2017 13:39) (97% - 100%)  I&O's Detail    03 Jul 2017 07:01  -  04 Jul 2017 07:00  --------------------------------------------------------  IN:    IV PiggyBack: 400 mL    Oral Fluid: 332 mL  Total IN: 732 mL    OUT:    Voided: 1400 mL  Total OUT: 1400 mL    Total NET: -668 mL      04 Jul 2017 07:01  -  04 Jul 2017 13:45  --------------------------------------------------------  IN:  Total IN: 0 mL    OUT:    Voided: 500 mL  Total OUT: 500 mL    Total NET: -500 mL    EPICARDIAL WIRES REMOVED: Yes/No.  TIE DOWNS REMOVED: Yes/No.    LABS:                        11.3   14.37 )-----------( 174      ( 04 Jul 2017 06:25 )             33.3     07-04    136  |  98  |  16  ----------------------------<  105<H>  3.5   |  24  |  0.95    Ca    8.4      04 Jul 2017 06:25    TPro  5.4<L>  /  Alb  2.8<L>  /  TBili  0.5  /  DBili  x   /  AST  26  /  ALT  14  /  AlkPhos  49  07-03    HOSPITAL MEDICATIONS:  heparin  Injectable 5000 Unit(s) SubCutaneous every 8 hours  atorvastatin 20 milliGRAM(s) Oral at bedtime  pantoprazole    Tablet 40 milliGRAM(s) Oral before breakfast  ciprofloxacin   IVPB 400 milliGRAM(s) IV Intermittent every 12 hours

## 2017-07-04 NOTE — DISCHARGE NOTE ADULT - CONDITIONS AT DISCHARGE
Alert & oriented. Out of bed ambulating with assistance. Tolerating diet without nausea or vomiting. Abdomen is soft , + BS, + BM. Voiding without difficulty. Vitals stable, afebrile. Understands all discharge instruction & will follow up with the MD. Time allowed for questions.

## 2017-07-04 NOTE — DISCHARGE NOTE ADULT - NSTOBACCOHOTLINE_GEN_A_NCS
[facility] Smokers Quitline (076-UY-ESEPS) [facility] Smokers Quitline (440-RL-ZCMMN) [facility] Smokers Quitline (787-HV-MKEBV)

## 2017-07-04 NOTE — DISCHARGE NOTE ADULT - CARE PLAN
Principal Discharge DX:	SBO (small bowel obstruction)  Goal:	Return of normal GI function.  Instructions for follow-up, activity and diet:	Please follow up with Dr. Sanz within 1-2 weeks after discharge from the hospital. You may call [phone] to schedule an appointment. Principal Discharge DX:	SBO (small bowel obstruction)  Goal:	Return of normal GI function.  Instructions for follow-up, activity and diet:	Please follow up with Dr. Sanz within 1-2 weeks after discharge from the hospital. You may call [phone] to schedule an appointment. Principal Discharge DX:	SBO (small bowel obstruction)  Goal:	Return of normal GI function.  Instructions for follow-up, activity and diet:	Please follow up with Dr. Sanz within 1-2 weeks after discharge from the hospital. You may call [phone] to schedule an appointment. Principal Discharge DX:	SBO (small bowel obstruction)  Goal:	Return of normal GI function.  Instructions for follow-up, activity and diet:	Please follow up with Dr. Sanz within 1-2 weeks after discharge from the hospital. You may call [phone] to schedule an appointment.  Secondary Diagnosis:	HTN (hypertension)  Goal:	please follow up with your Primary Care Doctor , follow up appt needed Principal Discharge DX:	SBO (small bowel obstruction)  Goal:	Return of normal GI function.  Instructions for follow-up, activity and diet:	Please follow up with Dr. Sanz within 1-2 weeks after discharge from the hospital. You may call [phone] to schedule an appointment.  Secondary Diagnosis:	HTN (hypertension)  Goal:	please follow up with your Primary Care Doctor , follow up appt needed Principal Discharge DX:	SBO (small bowel obstruction)  Goal:	Return of normal GI function.  Instructions for follow-up, activity and diet:	Please follow up with Dr. Sanz within 1-2 weeks after discharge from the hospital. You may call [phone] to schedule an appointment.  Secondary Diagnosis:	HTN (hypertension)  Goal:	please follow up with your Primary Care Doctor , follow up appt needed

## 2017-07-04 NOTE — DISCHARGE NOTE ADULT - MEDICATION SUMMARY - MEDICATIONS TO TAKE
I will START or STAY ON the medications listed below when I get home from the hospital:    lisinopril 5 mg oral tablet  -- 1 tab(s) by mouth once a day  -- Indication: For HTN (hypertension)    atorvastatin 20 mg oral tablet  -- 1 tab(s) by mouth once a day  -- Indication: For HLD    atenolol 25 mg oral tablet  -- 1 tab(s) by mouth once a day  -- Indication: For HTN (hypertension)    NexIUM 40 mg oral delayed release capsule  -- 1 cap(s) by mouth once a day  -- Indication: For Douglas esophagus    ciprofloxacin 10 mg/mL intravenous solution  -- 40 milliliter(s) intravenous every 12 hours  -- Indication: For UTI I will START or STAY ON the medications listed below when I get home from the hospital:    lisinopril 5 mg oral tablet  -- 1 tab(s) by mouth once a day  -- Indication: For HTN (hypertension)    atorvastatin 20 mg oral tablet  -- 1 tab(s) by mouth once a day  -- Indication: For HLD    atenolol 25 mg oral tablet  -- 1 tab(s) by mouth once a day  -- Indication: For HTN (hypertension)    NexIUM 40 mg oral delayed release capsule  -- 1 cap(s) by mouth once a day  -- Indication: For Douglas esophagus    ciprofloxacin 500 mg oral tablet  -- 1 tab(s) by mouth 2 times a day MDD:2 for Urinary Tract Infection  -- Indication: For Antibiotics

## 2017-07-04 NOTE — DISCHARGE NOTE ADULT - PROVIDER RX CONTACT NUMBER
Please follow up with Dr. Sanz within 1-2 weeks after discharge from the hospital. You may call [phone] to schedule an appointment.

## 2017-07-04 NOTE — DISCHARGE NOTE ADULT - PATIENT PORTAL LINK FT
“You can access the [employer] Patient Portal, offered by [facility], by registering with the following website: [email or web address]”

## 2017-07-04 NOTE — DISCHARGE NOTE ADULT - ADDITIONAL INSTRUCTIONS
DIET: Return to your usual diet.  NOTIFY YOUR SURGEON IF: You have any bleeding that does not stop, any pus draining from your wound(s), any fever (over 100.4 F) or chills, persistent nausea/vomiting, persistent diarrhea, or if your pain is not controlled on your discharge pain medications.  FOLLOW-UP: Please follow up with your primary care physician in 1-2 weeks regarding your hospitalization. Please follow-up with your surgeon, within 7 days following discharge- please call to schedule an appointment.

## 2017-07-04 NOTE — DISCHARGE NOTE ADULT - INSTRUCTIONS
Watch for signs of abdominal pain, nausea vomiting, abdominal distention, fever, chills or heat, report such symptoms to the MD. Drink 6-8 glasses of fluids daily to promote hydration. Keep environment safe of tripping hazards, & ask for assistance if feeling lightheaded, follow up with Physical therapy to assist with ambulation needs. No heavy lifting, pulling or pushing heavy objects. Follow up with primary & surgical MD. Alert & oriented. Out of bed ambulating with assistance. Tolerating diet without nausea or vomiting. Abdomen is soft , + BS, + BM. Voiding without difficulty. Vitals stable, afebrile. Understands all discharge instruction & will follow up with the MD. Time allowed for questions.

## 2017-07-04 NOTE — DISCHARGE NOTE ADULT - CARE PROVIDER_API CALL
Willem Sanz (MD), Surgery  [address]  [place] [address]  Phone: [phone]  Fax: [phone]

## 2017-07-05 VITALS
DIASTOLIC BLOOD PRESSURE: 86 MMHG | RESPIRATION RATE: 18 BRPM | HEART RATE: 81 BPM | SYSTOLIC BLOOD PRESSURE: 138 MMHG | OXYGEN SATURATION: 99 % | TEMPERATURE: 98 F

## 2017-07-05 LAB
BUN SERPL-MCNC: 12 MG/DL — SIGNIFICANT CHANGE UP (ref 7–23)
CALCIUM SERPL-MCNC: 8.4 MG/DL — SIGNIFICANT CHANGE UP (ref 8.4–10.5)
CHLORIDE SERPL-SCNC: 101 MMOL/L — SIGNIFICANT CHANGE UP (ref 98–107)
CO2 SERPL-SCNC: 34 MMOL/L — HIGH (ref 22–31)
CREAT SERPL-MCNC: 1 MG/DL — SIGNIFICANT CHANGE UP (ref 0.5–1.3)
GLUCOSE SERPL-MCNC: 129 MG/DL — HIGH (ref 70–99)
HCT VFR BLD CALC: 38.3 % — LOW (ref 39–50)
HGB BLD-MCNC: 13.2 G/DL — SIGNIFICANT CHANGE UP (ref 13–17)
MCHC RBC-ENTMCNC: 32.8 PG — SIGNIFICANT CHANGE UP (ref 27–34)
MCHC RBC-ENTMCNC: 34.5 % — SIGNIFICANT CHANGE UP (ref 32–36)
MCV RBC AUTO: 95 FL — SIGNIFICANT CHANGE UP (ref 80–100)
NRBC # FLD: 0 — SIGNIFICANT CHANGE UP
PLATELET # BLD AUTO: 206 K/UL — SIGNIFICANT CHANGE UP (ref 150–400)
PMV BLD: 10.7 FL — SIGNIFICANT CHANGE UP (ref 7–13)
POTASSIUM SERPL-MCNC: 3.9 MMOL/L — SIGNIFICANT CHANGE UP (ref 3.5–5.3)
POTASSIUM SERPL-SCNC: 3.9 MMOL/L — SIGNIFICANT CHANGE UP (ref 3.5–5.3)
RBC # BLD: 4.03 M/UL — LOW (ref 4.2–5.8)
RBC # FLD: 13.1 % — SIGNIFICANT CHANGE UP (ref 10.3–14.5)
SODIUM SERPL-SCNC: 142 MMOL/L — SIGNIFICANT CHANGE UP (ref 135–145)
WBC # BLD: 10.8 K/UL — HIGH (ref 3.8–10.5)
WBC # FLD AUTO: 10.8 K/UL — HIGH (ref 3.8–10.5)

## 2017-07-05 RX ORDER — CIPROFLOXACIN LACTATE 400MG/40ML
1 VIAL (ML) INTRAVENOUS
Qty: 8 | Refills: 0 | OUTPATIENT
Start: 2017-07-05 | End: 2017-07-09

## 2017-07-05 RX ADMIN — Medication 200 MILLIGRAM(S): at 05:54

## 2017-07-05 RX ADMIN — HEPARIN SODIUM 5000 UNIT(S): 5000 INJECTION INTRAVENOUS; SUBCUTANEOUS at 05:54

## 2017-07-05 RX ADMIN — PANTOPRAZOLE SODIUM 40 MILLIGRAM(S): 20 TABLET, DELAYED RELEASE ORAL at 05:54

## 2017-07-05 NOTE — PROGRESS NOTE ADULT - SUBJECTIVE AND OBJECTIVE BOX
Patient is a 83y old  Male who presents with a chief complaint of Vomiting secondary to a small bowel obstruction. (04 Jul 2017 13:25)      SUBJECTIVE: Pt seen and examined today . No acute events vernight. Passing gas. pt had a bowel movement yesterday, tolerating diet. Pain controlled, no nausea/vomiting/headache/dizziness/chest pain/shortness of breath    OBJECTIVE:  PHYSICAL EXAM:  GA: NAD, AOX3   Abdomen:  -  soft, non-distended, appropriate incisional tenderness    Vitals/Labs:  Vital Signs Last 24 Hrs  T(C): 36.7 (05 Jul 2017 05:46), Max: 36.9 (04 Jul 2017 18:16)  T(F): 98.1 (05 Jul 2017 05:46), Max: 98.4 (04 Jul 2017 18:16)  HR: 81 (05 Jul 2017 05:46) (68 - 104)  BP: 138/86 (05 Jul 2017 05:46) (113/77 - 138/86)  BP(mean): --  RR: 18 (05 Jul 2017 05:46) (17 - 18)  SpO2: 99% (05 Jul 2017 05:46) (98% - 100%)    I&O's Detail    04 Jul 2017 07:01  -  05 Jul 2017 07:00  --------------------------------------------------------  IN:    Oral Fluid: 400 mL  Total IN: 400 mL    OUT:    Voided: 1003 mL  Total OUT: 1003 mL    Total NET: -603 mL                                11.3   14.37 )-----------( 174      ( 04 Jul 2017 06:25 )             33.3       07-04    136  |  98  |  16  ----------------------------<  105<H>  3.5   |  24  |  0.95    Ca    8.4      04 Jul 2017 06:25        CAPILLARY BLOOD GLUCOSE    MEDICATIONS  (STANDING):  heparin  Injectable 5000 Unit(s) SubCutaneous every 8 hours  atorvastatin 20 milliGRAM(s) Oral at bedtime  pantoprazole    Tablet 40 milliGRAM(s) Oral before breakfast  ciprofloxacin   IVPB 400 milliGRAM(s) IV Intermittent every 12 hours    MEDICATIONS  (PRN):      ASSESSMENT/PLAN: LETY CALDERON 83y Male s/p  SBO now resolved   - Diet: Low Fiber   - Pain control   - Input and outputs   - DVT ppx  - OOB  -discharge planning today     A Team   [id number] Patient is a 83y old  Male who presents with a chief complaint of Vomiting secondary to a small bowel obstruction. (04 Jul 2017 13:25)      SUBJECTIVE: Pt seen and examined today . No acute events vernight. Passing gas. pt had a bowel movement yesterday, tolerating diet. Pain controlled, no nausea/vomiting/headache/dizziness/chest pain/shortness of breath    OBJECTIVE:  PHYSICAL EXAM:  GA: NAD, AOX3   Abdomen:  -  soft, non-distended, appropriate incisional tenderness    Vitals/Labs:  Vital Signs Last 24 Hrs  T(C): 36.7 (05 Jul 2017 05:46), Max: 36.9 (04 Jul 2017 18:16)  T(F): 98.1 (05 Jul 2017 05:46), Max: 98.4 (04 Jul 2017 18:16)  HR: 81 (05 Jul 2017 05:46) (68 - 104)  BP: 138/86 (05 Jul 2017 05:46) (113/77 - 138/86)  BP(mean): --  RR: 18 (05 Jul 2017 05:46) (17 - 18)  SpO2: 99% (05 Jul 2017 05:46) (98% - 100%)    I&O's Detail    04 Jul 2017 07:01  -  05 Jul 2017 07:00  --------------------------------------------------------  IN:    Oral Fluid: 400 mL  Total IN: 400 mL    OUT:    Voided: 1003 mL  Total OUT: 1003 mL    Total NET: -603 mL                                11.3   14.37 )-----------( 174      ( 04 Jul 2017 06:25 )             33.3       07-04    136  |  98  |  16  ----------------------------<  105<H>  3.5   |  24  |  0.95    Ca    8.4      04 Jul 2017 06:25        CAPILLARY BLOOD GLUCOSE    MEDICATIONS  (STANDING):  heparin  Injectable 5000 Unit(s) SubCutaneous every 8 hours  atorvastatin 20 milliGRAM(s) Oral at bedtime  pantoprazole    Tablet 40 milliGRAM(s) Oral before breakfast  ciprofloxacin   IVPB 400 milliGRAM(s) IV Intermittent every 12 hours    MEDICATIONS  (PRN):      ASSESSMENT/PLAN: LETY CALDERON 83y Male s/p  SBO now resolved   - Diet: Low Fiber   - Pain control   - Input and outputs   - DVT ppx  - OOB  -cont abx for uti   -discharge planning today     A Team   [id number]

## 2017-07-07 LAB
BACTERIA BLD CULT: SIGNIFICANT CHANGE UP
BACTERIA BLD CULT: SIGNIFICANT CHANGE UP

## 2018-10-16 ENCOUNTER — INPATIENT (INPATIENT)
Facility: HOSPITAL | Age: 83
LOS: 2 days | Discharge: HOME | DRG: 390 | End: 2018-10-19
Attending: SURGERY | Admitting: SURGERY
Payer: MEDICARE

## 2018-10-16 VITALS
TEMPERATURE: 98 F | DIASTOLIC BLOOD PRESSURE: 69 MMHG | OXYGEN SATURATION: 96 % | RESPIRATION RATE: 18 BRPM | HEIGHT: 71 IN | WEIGHT: 169.98 LBS | HEART RATE: 68 BPM | SYSTOLIC BLOOD PRESSURE: 107 MMHG

## 2018-10-16 DIAGNOSIS — K56.609 UNSPECIFIED INTESTINAL OBSTRUCTION, UNSPECIFIED AS TO PARTIAL VERSUS COMPLETE OBSTRUCTION: ICD-10-CM

## 2018-10-16 DIAGNOSIS — Z98.890 OTHER SPECIFIED POSTPROCEDURAL STATES: Chronic | ICD-10-CM

## 2018-10-16 DIAGNOSIS — I10 ESSENTIAL (PRIMARY) HYPERTENSION: ICD-10-CM

## 2018-10-16 PROBLEM — I73.9 PERIPHERAL VASCULAR DISEASE, UNSPECIFIED: Chronic | Status: ACTIVE | Noted: 2017-06-28

## 2018-10-16 PROBLEM — K22.70 BARRETT'S ESOPHAGUS WITHOUT DYSPLASIA: Chronic | Status: ACTIVE | Noted: 2017-06-28

## 2018-10-16 LAB
ALBUMIN SERPL ELPH-MCNC: 3.7 G/DL — SIGNIFICANT CHANGE UP (ref 3.5–5)
ALP SERPL-CCNC: 71 U/L — SIGNIFICANT CHANGE UP (ref 40–120)
ALT FLD-CCNC: 15 U/L DA — SIGNIFICANT CHANGE UP (ref 10–60)
ANION GAP SERPL CALC-SCNC: 8 MMOL/L — SIGNIFICANT CHANGE UP (ref 5–17)
AST SERPL-CCNC: 11 U/L — SIGNIFICANT CHANGE UP (ref 10–40)
BASOPHILS # BLD AUTO: 0.1 K/UL — SIGNIFICANT CHANGE UP (ref 0–0.2)
BASOPHILS NFR BLD AUTO: 0.3 % — SIGNIFICANT CHANGE UP (ref 0–2)
BILIRUB SERPL-MCNC: 0.8 MG/DL — SIGNIFICANT CHANGE UP (ref 0.2–1.2)
BUN SERPL-MCNC: 26 MG/DL — HIGH (ref 7–18)
CALCIUM SERPL-MCNC: 9.5 MG/DL — SIGNIFICANT CHANGE UP (ref 8.4–10.5)
CHLORIDE SERPL-SCNC: 96 MMOL/L — SIGNIFICANT CHANGE UP (ref 96–108)
CO2 SERPL-SCNC: 29 MMOL/L — SIGNIFICANT CHANGE UP (ref 22–31)
CREAT SERPL-MCNC: 1.13 MG/DL — SIGNIFICANT CHANGE UP (ref 0.5–1.3)
EOSINOPHIL # BLD AUTO: 0 K/UL — SIGNIFICANT CHANGE UP (ref 0–0.5)
EOSINOPHIL NFR BLD AUTO: 0.1 % — SIGNIFICANT CHANGE UP (ref 0–6)
GLUCOSE SERPL-MCNC: 120 MG/DL — HIGH (ref 70–99)
HCT VFR BLD CALC: 38.8 % — LOW (ref 39–50)
HGB BLD-MCNC: 13.1 G/DL — SIGNIFICANT CHANGE UP (ref 13–17)
LACTATE SERPL-SCNC: 1.6 MMOL/L — SIGNIFICANT CHANGE UP (ref 0.7–2)
LIDOCAIN IGE QN: 123 U/L — SIGNIFICANT CHANGE UP (ref 73–393)
LYMPHOCYTES # BLD AUTO: 0.9 K/UL — LOW (ref 1–3.3)
LYMPHOCYTES # BLD AUTO: 5.6 % — LOW (ref 13–44)
MCHC RBC-ENTMCNC: 31.8 PG — SIGNIFICANT CHANGE UP (ref 27–34)
MCHC RBC-ENTMCNC: 33.8 GM/DL — SIGNIFICANT CHANGE UP (ref 32–36)
MCV RBC AUTO: 94 FL — SIGNIFICANT CHANGE UP (ref 80–100)
MONOCYTES # BLD AUTO: 1.2 K/UL — HIGH (ref 0–0.9)
MONOCYTES NFR BLD AUTO: 7.7 % — SIGNIFICANT CHANGE UP (ref 2–14)
NEUTROPHILS # BLD AUTO: 13.7 K/UL — HIGH (ref 1.8–7.4)
NEUTROPHILS NFR BLD AUTO: 86.4 % — HIGH (ref 43–77)
PLATELET # BLD AUTO: 320 K/UL — SIGNIFICANT CHANGE UP (ref 150–400)
POTASSIUM SERPL-MCNC: 4.6 MMOL/L — SIGNIFICANT CHANGE UP (ref 3.5–5.3)
POTASSIUM SERPL-SCNC: 4.6 MMOL/L — SIGNIFICANT CHANGE UP (ref 3.5–5.3)
PROT SERPL-MCNC: 8.3 G/DL — SIGNIFICANT CHANGE UP (ref 6–8.3)
RBC # BLD: 4.12 M/UL — LOW (ref 4.2–5.8)
RBC # FLD: 12.5 % — SIGNIFICANT CHANGE UP (ref 10.3–14.5)
SODIUM SERPL-SCNC: 133 MMOL/L — LOW (ref 135–145)
WBC # BLD: 15.9 K/UL — HIGH (ref 3.8–10.5)
WBC # FLD AUTO: 15.9 K/UL — HIGH (ref 3.8–10.5)

## 2018-10-16 PROCEDURE — 71045 X-RAY EXAM CHEST 1 VIEW: CPT | Mod: 26

## 2018-10-16 PROCEDURE — 99285 EMERGENCY DEPT VISIT HI MDM: CPT

## 2018-10-16 PROCEDURE — 99222 1ST HOSP IP/OBS MODERATE 55: CPT

## 2018-10-16 PROCEDURE — 74177 CT ABD & PELVIS W/CONTRAST: CPT | Mod: 26

## 2018-10-16 RX ORDER — ATORVASTATIN CALCIUM 80 MG/1
1 TABLET, FILM COATED ORAL
Qty: 0 | Refills: 0 | COMMUNITY

## 2018-10-16 RX ORDER — PANTOPRAZOLE SODIUM 20 MG/1
40 TABLET, DELAYED RELEASE ORAL DAILY
Qty: 0 | Refills: 0 | Status: DISCONTINUED | OUTPATIENT
Start: 2018-10-16 | End: 2018-10-19

## 2018-10-16 RX ORDER — HEPARIN SODIUM 5000 [USP'U]/ML
5000 INJECTION INTRAVENOUS; SUBCUTANEOUS EVERY 8 HOURS
Qty: 0 | Refills: 0 | Status: DISCONTINUED | OUTPATIENT
Start: 2018-10-16 | End: 2018-10-19

## 2018-10-16 RX ORDER — SODIUM CHLORIDE 9 MG/ML
1000 INJECTION INTRAMUSCULAR; INTRAVENOUS; SUBCUTANEOUS ONCE
Qty: 0 | Refills: 0 | Status: COMPLETED | OUTPATIENT
Start: 2018-10-16 | End: 2018-10-16

## 2018-10-16 RX ORDER — SODIUM CHLORIDE 9 MG/ML
1000 INJECTION INTRAMUSCULAR; INTRAVENOUS; SUBCUTANEOUS
Qty: 0 | Refills: 0 | Status: DISCONTINUED | OUTPATIENT
Start: 2018-10-16 | End: 2018-10-19

## 2018-10-16 RX ORDER — METOPROLOL TARTRATE 50 MG
5 TABLET ORAL EVERY 6 HOURS
Qty: 0 | Refills: 0 | Status: DISCONTINUED | OUTPATIENT
Start: 2018-10-16 | End: 2018-10-18

## 2018-10-16 RX ADMIN — HEPARIN SODIUM 5000 UNIT(S): 5000 INJECTION INTRAVENOUS; SUBCUTANEOUS at 14:00

## 2018-10-16 RX ADMIN — SODIUM CHLORIDE 100 MILLILITER(S): 9 INJECTION INTRAMUSCULAR; INTRAVENOUS; SUBCUTANEOUS at 21:23

## 2018-10-16 RX ADMIN — SODIUM CHLORIDE 1000 MILLILITER(S): 9 INJECTION INTRAMUSCULAR; INTRAVENOUS; SUBCUTANEOUS at 08:56

## 2018-10-16 RX ADMIN — HEPARIN SODIUM 5000 UNIT(S): 5000 INJECTION INTRAVENOUS; SUBCUTANEOUS at 21:58

## 2018-10-16 NOTE — ED PROVIDER NOTE - OBJECTIVE STATEMENT
85 yr old male with hx of right hemicolectomy, HTn, barrets esophagus, SBO presents to ed c/o vomiting ~7 episodes NBNB with diarrhea this am since yesterday. no fever, no chills, no cp, no palpitations, no abd pain, no dysuria, no rashes, no sick contact

## 2018-10-16 NOTE — H&P ADULT - HISTORY OF PRESENT ILLNESS
85 year old male with PMH of colon cancer now s/p right hemicolectomy, HTN, barretts esophagus, PVD, and arthritis presents to the ED complaining of one day of approximately 3 episodes of nausea/ vomiting and diarrhea. Patient has had one prior episode of small bowel obstruction in the past which resolved with conservative management with NGT. Denies any flatus or further BM/diarrhea since presenting to the ED. He denies abdominal pain now, and did not have pain with his prior episode either. Denies chest pain, shortness of breath, abdominal pain, constipation, difficulty urinating, hematuria, hematemesis, and melena.

## 2018-10-16 NOTE — ED ADULT NURSE REASSESSMENT NOTE - NS ED NURSE REASSESS COMMENT FT1
Pt remains stable, AOX3, no s/s of any distress noted. IV line in place, IV fluids infusing as per orders, no signs of infiltration noted. NPO. VS WNL. Call bell in reach, bed in lowest position. Safety maintained, hourly roundings performed. NG tube connected to low suction, No SOB, no acute distress noted. Endorsed to nurse Merchant

## 2018-10-16 NOTE — H&P ADULT - ASSESSMENT
85 year old male with small bowel obstruction with leukocytosis. History of right hemicolectomy secondary to colon CA, HTN

## 2018-10-16 NOTE — ED ADULT NURSE REASSESSMENT NOTE - NS ED NURSE REASSESS COMMENT FT1
Pt aox3, NG tube placed in ED at bed side, to right nostril, Pt in stable condition, VS WNL,, No SOB, no acute distress noted.

## 2018-10-16 NOTE — ED ADULT NURSE NOTE - OBJECTIVE STATEMENT
Pt AOx3, ambulatory, c/o diarrhea and vomit since yesterday, Pt denies abdominal or chest pain, denies bloody stools or hematuria. No other s/s.

## 2018-10-16 NOTE — H&P ADULT - NSHPLABSRESULTS_GEN_ALL_CORE
LABS:                        13.1   15.9  )-----------( 320      ( 16 Oct 2018 09:04 )             38.8     10-16    133<L>  |  96  |  26<H>  ----------------------------<  120<H>  4.6   |  29  |  1.13    Ca    9.5      16 Oct 2018 09:04    TPro  8.3  /  Alb  3.7  /  TBili  0.8  /  DBili  x   /  AST  11  /  ALT  15  /  AlkPhos  71  10-16    RADIOLOGY & ADDITIONAL STUDIES:    < from: CT Abdomen and Pelvis w/ IV Cont (10.16.18 @ 10:23) >    EXAM:  CT ABDOMEN AND PELVIS IC                            PROCEDURE DATE:  10/16/2018          INTERPRETATION:  CLINICAL INFORMATION: Vomiting    COMPARISON: 6/28/2017    PROCEDURE:   CT of the Abdomen and Pelvis was performed with intravenous contrast.   Intravenous contrast: 97 ml Omnipaque 350. 3 ml discarded.  Oral contrast: None.  Sagittal and coronal reformats were performed.    FINDINGS:    LOWER CHEST: Lung base fibrotic changes (subpleural cysts). Coronary   artery calcifications.    LIVER: Multiple subcentimeter low attenuating lesions are present in the   liver, too small to further characterize.  BILE DUCTS: Normal caliber.  GALLBLADDER: Within normal limits.  SPLEEN: Within normal limits.  PANCREAS: Within normal limits.  ADRENALS: Within normal limits.  KIDNEYS/URETERS: Renal cysts measuring up to 9.4 cm. Additional   subcentimeter hypodense renal lesions are too small to further   characterize. No hydronephrosis.    BLADDER: Mild wall thickening.  REPRODUCTIVE ORGANS: The prostate is enlarged.    BOWEL: Distended small bowel with right lower abdominal transition.   Distal small bowel is collapsed. Status post right hemicolectomy.     PERITONEUM: No ascites.  VESSELS:  Atherosclerotic changes.  RETROPERITONEUM: No lymphadenopathy.    ABDOMINAL WALL: Small umbilical hernia containing small bowel.  BONES: Spinal degenerative changes. Grade 1 spondylolisthesis of L4 over   L5.    IMPRESSION:     Small bowel obstruction with right lower abdominal transition point.    JAY LICEA M.D., ATTENDING RADIOLOGIST  This document has been electronically signed. Oct 16 2018 10:47AM    < end of copied text >

## 2018-10-16 NOTE — ED PROVIDER NOTE - MEDICAL DECISION MAKING DETAILS
85 yr old male with hx of right hemicolectomy, HTn, barrets esophagus, SBO presents to ed c/o vomiting ~7 episodes NBNB with diarrhea this am since yesterday. no fever, no chills, no cp, no palpitations, no abd pain, no dysuria, no rashes, no sick contact    pt with vomiting and diarrhea otherwise asx with hx of sbo will r/o sbo vs gastroenteritis vs colitis vs pancreatitis.  labs, fluids, ct, re-assess

## 2018-10-16 NOTE — H&P ADULT - NSHPPHYSICALEXAM_GEN_ALL_CORE
Dr. Albert Machuca contact information [phone]  Vital Signs Last 24 Hrs  T(C): 36.8 (16 Oct 2018 07:57), Max: 36.8 (16 Oct 2018 07:57)  T(F): 98.2 (16 Oct 2018 07:57), Max: 98.2 (16 Oct 2018 07:57)  HR: 68 (16 Oct 2018 07:57) (68 - 68)  BP: 107/69 (16 Oct 2018 07:57) (107/69 - 107/69)  RR: 18 (16 Oct 2018 07:57) (18 - 18)  SpO2: 96% (16 Oct 2018 07:57) (96% - 96%)    Physical Exam:    General:  Appears stated age, well-groomed, well-nourished, no distress  Eyes: EOMI  HENT:  WNL, no JVD  Chest: CTA B/l  Cardiovascular: S1S2; Regular rate & rhythm  Abdomen: NGT in place draining with minimal output, Soft, NT/ND  Extremities: No edema to bilateral lower extremities  Skin: warm and dry   Musculoskeletal: no calf tenderness  Neuro:  Alert, oriented to time, place and person   Psych: normal affect

## 2018-10-17 DIAGNOSIS — R94.31 ABNORMAL ELECTROCARDIOGRAM [ECG] [EKG]: ICD-10-CM

## 2018-10-17 LAB
ANION GAP SERPL CALC-SCNC: 6 MMOL/L — SIGNIFICANT CHANGE UP (ref 5–17)
BUN SERPL-MCNC: 36 MG/DL — HIGH (ref 7–18)
CALCIUM SERPL-MCNC: 9 MG/DL — SIGNIFICANT CHANGE UP (ref 8.4–10.5)
CEA SERPL-MCNC: 3.2 NG/ML — SIGNIFICANT CHANGE UP (ref 0–3.8)
CHLORIDE SERPL-SCNC: 102 MMOL/L — SIGNIFICANT CHANGE UP (ref 96–108)
CK MB BLD-MCNC: 1.6 % — SIGNIFICANT CHANGE UP (ref 0–3.5)
CK MB CFR SERPL CALC: 1.8 NG/ML — SIGNIFICANT CHANGE UP (ref 0–3.6)
CK SERPL-CCNC: 111 U/L — SIGNIFICANT CHANGE UP (ref 35–232)
CO2 SERPL-SCNC: 29 MMOL/L — SIGNIFICANT CHANGE UP (ref 22–31)
CREAT SERPL-MCNC: 0.96 MG/DL — SIGNIFICANT CHANGE UP (ref 0.5–1.3)
GLUCOSE SERPL-MCNC: 102 MG/DL — HIGH (ref 70–99)
HCT VFR BLD CALC: 35.5 % — LOW (ref 39–50)
HGB BLD-MCNC: 11.8 G/DL — LOW (ref 13–17)
MAGNESIUM SERPL-MCNC: 2.1 MG/DL — SIGNIFICANT CHANGE UP (ref 1.6–2.6)
MCHC RBC-ENTMCNC: 31.5 PG — SIGNIFICANT CHANGE UP (ref 27–34)
MCHC RBC-ENTMCNC: 33.2 GM/DL — SIGNIFICANT CHANGE UP (ref 32–36)
MCV RBC AUTO: 95 FL — SIGNIFICANT CHANGE UP (ref 80–100)
PHOSPHATE SERPL-MCNC: 3.3 MG/DL — SIGNIFICANT CHANGE UP (ref 2.5–4.5)
PLATELET # BLD AUTO: 288 K/UL — SIGNIFICANT CHANGE UP (ref 150–400)
POTASSIUM SERPL-MCNC: 4.3 MMOL/L — SIGNIFICANT CHANGE UP (ref 3.5–5.3)
POTASSIUM SERPL-SCNC: 4.3 MMOL/L — SIGNIFICANT CHANGE UP (ref 3.5–5.3)
RBC # BLD: 3.74 M/UL — LOW (ref 4.2–5.8)
RBC # FLD: 12.5 % — SIGNIFICANT CHANGE UP (ref 10.3–14.5)
SODIUM SERPL-SCNC: 137 MMOL/L — SIGNIFICANT CHANGE UP (ref 135–145)
TROPONIN I SERPL-MCNC: <0.015 NG/ML — SIGNIFICANT CHANGE UP (ref 0–0.04)
WBC # BLD: 10.9 K/UL — HIGH (ref 3.8–10.5)
WBC # FLD AUTO: 10.9 K/UL — HIGH (ref 3.8–10.5)

## 2018-10-17 PROCEDURE — 99233 SBSQ HOSP IP/OBS HIGH 50: CPT

## 2018-10-17 PROCEDURE — 74019 RADEX ABDOMEN 2 VIEWS: CPT | Mod: 26

## 2018-10-17 PROCEDURE — 74250 X-RAY XM SM INT 1CNTRST STD: CPT | Mod: 26

## 2018-10-17 RX ORDER — INFLUENZA VIRUS VACCINE 15; 15; 15; 15 UG/.5ML; UG/.5ML; UG/.5ML; UG/.5ML
0.5 SUSPENSION INTRAMUSCULAR ONCE
Qty: 0 | Refills: 0 | Status: DISCONTINUED | OUTPATIENT
Start: 2018-10-17 | End: 2018-10-19

## 2018-10-17 RX ADMIN — Medication 5 MILLIGRAM(S): at 05:42

## 2018-10-17 RX ADMIN — Medication 5 MILLIGRAM(S): at 12:37

## 2018-10-17 RX ADMIN — HEPARIN SODIUM 5000 UNIT(S): 5000 INJECTION INTRAVENOUS; SUBCUTANEOUS at 05:42

## 2018-10-17 RX ADMIN — Medication 1.25 MILLIGRAM(S): at 05:42

## 2018-10-17 RX ADMIN — Medication 5 MILLIGRAM(S): at 00:51

## 2018-10-17 RX ADMIN — HEPARIN SODIUM 5000 UNIT(S): 5000 INJECTION INTRAVENOUS; SUBCUTANEOUS at 22:08

## 2018-10-17 RX ADMIN — Medication 1.25 MILLIGRAM(S): at 17:10

## 2018-10-17 RX ADMIN — PANTOPRAZOLE SODIUM 40 MILLIGRAM(S): 20 TABLET, DELAYED RELEASE ORAL at 12:38

## 2018-10-17 RX ADMIN — Medication 5 MILLIGRAM(S): at 17:10

## 2018-10-17 RX ADMIN — SODIUM CHLORIDE 100 MILLILITER(S): 9 INJECTION INTRAMUSCULAR; INTRAVENOUS; SUBCUTANEOUS at 22:10

## 2018-10-17 RX ADMIN — HEPARIN SODIUM 5000 UNIT(S): 5000 INJECTION INTRAVENOUS; SUBCUTANEOUS at 17:10

## 2018-10-17 RX ADMIN — Medication 1.25 MILLIGRAM(S): at 12:37

## 2018-10-17 NOTE — PROGRESS NOTE ADULT - SUBJECTIVE AND OBJECTIVE BOX
Patient came to ER for nausea/vomiting. Found to have a SBO, probably due to adhesions from previois surgery for Colon cancer.    PMH: HTN  Colon Cancer  RAPE: Afebrile.table.    Lungs: Clear.  Cor: NSRS4  Abd: Soft, non-tender, no rebound.  No edema    Labs: WBC coiunt improved.  Hgb dropped but stable  CTA: SBO

## 2018-10-17 NOTE — CONSULT NOTE ADULT - PROBLEM SELECTOR RECOMMENDATION 9
- Patient with borderline tachycardia to 102.   - EKG: sinus rhythm with PACs and RBBB  - Compared to prior EKG and no changes noted.  - Patient asymptomatic  - Check troponin x 1 , if abnormal trend further.   - Per patient, had ECHO and stress test 1 years back and was normal.     Plan discussed with Dr. Raphael.

## 2018-10-17 NOTE — CONSULT NOTE ADULT - ASSESSMENT
85 year old male with PMH of colon cancer now s/p right hemicolectomy, HTN, polanco's esophagus, PVD, and arthritis admitted for SBO. Patient noted to have borderline tachycardia with abnormal EKG.

## 2018-10-17 NOTE — CONSULT NOTE ADULT - SUBJECTIVE AND OBJECTIVE BOX
Asked to see Patient for evaluation of abnormal EKG.     HPI:  85 year old male with PMH of colon cancer now s/p right hemicolectomy, HTN, barretts esophagus, PVD, and arthritis presents to the ED complaining of one day of approximately 3 episodes of nausea/ vomiting and diarrhea. Patient has had one prior episode of small bowel obstruction in the past which resolved with conservative management with NGT. Denies any flatus or further BM/diarrhea since presenting to the ED. He denies abdominal pain now, and did not have pain with his prior episode either. Denies chest pain, shortness of breath, abdominal pain, constipation, difficulty urinating, hematuria, hematemesis, and melena. (16 Oct 2018 13:17)    Patient was noted to be tachycardic to 102, so EKG was done which showed sinus rhythm with PACs and RBBB. Patient noted to have similar changes in prior EKG as well. Patient seen and examined at bedside, denies chest pain, sob, palpitations, lightheadedness or any other complaints. Ar baseline, walks without assistance and does not have any dyspnea on exertion. No prior cardiac history, says had ECHO and stress test done as outpatient 1-2 years back and it was normal.     PAST MEDICAL & SURGICAL HISTORY:  PVD (peripheral vascular disease)  Douglas esophagus  HTN (hypertension)  H/O inguinal hernia repair: left  H/O right hemicolectomy      REVIEW OF SYSTEMS:    CONSTITUTIONAL: No fever, weight loss, or fatigue  EYES: No eye pain, visual disturbances, or discharge  ENMT:  No difficulty hearing, tinnitus, vertigo; No sinus or throat pain  NECK: No pain or stiffness  RESPIRATORY: No cough, wheezing, chills or hemoptysis; No shortness of breath  CARDIOVASCULAR: No chest pain, palpitations, dizziness, or leg swelling  GASTROINTESTINAL: No abdominal or epigastric pain. No nausea, vomiting, or hematemesis; No diarrhea or constipation. No melena or hematochezia.  GENITOURINARY: No dysuria, frequency, hematuria, or incontinence  NEUROLOGICAL: No headaches, memory loss, loss of strength, numbness, or tremors  SKIN: No itching, burning, rashes, or lesions   ENDOCRINE: No heat or cold intolerance; No hair loss  MUSCULOSKELETAL: No joint pain or swelling; No muscle, back, or extremity pain  PSYCHIATRIC: No depression, anxiety, mood swings, or difficulty sleeping  HEME/LYMPH: No easy bruising, or bleeding gums  ALLERGY AND IMMUNOLOGIC: No hives or eczema      MEDICATIONS  (STANDING):  enalaprilat Injectable 1.25 milliGRAM(s) IV Push every 6 hours  heparin  Injectable 5000 Unit(s) SubCutaneous every 8 hours  influenza   Vaccine 0.5 milliLiter(s) IntraMuscular once  metoprolol tartrate Injectable 5 milliGRAM(s) IV Push every 6 hours  pantoprazole  Injectable 40 milliGRAM(s) IV Push daily  sodium chloride 0.9%. 1000 milliLiter(s) (100 mL/Hr) IV Continuous <Continuous>    MEDICATIONS  (PRN):      Allergies  No Known Allergies          SOCIAL HISTORY:  Former smoker, quit many years ago.         FAMILY HISTORY:  No pertinent family history in first degree relatives      Vital Signs Last 24 Hrs  T(C): 37.1 (17 Oct 2018 05:30), Max: 37.1 (17 Oct 2018 05:30)  T(F): 98.8 (17 Oct 2018 05:30), Max: 98.8 (17 Oct 2018 05:30)  HR: 85 (17 Oct 2018 06:28) (68 - 102)  BP: 129/72 (17 Oct 2018 06:28) (107/69 - 141/79)  BP(mean): --  RR: 17 (17 Oct 2018 05:30) (17 - 18)  SpO2: 99% (17 Oct 2018 05:30) (96% - 99%)    PHYSICAL EXAM:    GENERAL: NAD, well-groomed, well-developed  HEAD:  Atraumatic, Normocephalic  EYES: EOMI, PERRLA, conjunctiva and sclera clear  ENMT: No tonsillar erythema, exudates, or enlargement; Moist mucous membranes, Good dentition, No lesions  NECK: Supple, No JVD, Normal thyroid  CHEST/LUNG: Clear to percussion bilaterally; No rales, rhonchi, wheezing, or rubs  HEART: Regular rate and rhythm; No murmurs, rubs, or gallops  ABDOMEN: Soft, Nontender, Nondistended; Bowel sounds diminished.   EXTREMITIES:  2+ Peripheral Pulses, No clubbing, cyanosis, or edema  SKIN: No rashes or lesions  NERVOUS SYSTEM:  Alert & Oriented X3, Good concentration; Motor Strength 5/5 B/L upper and lower extremities    LABS:                        13.1   15.9  )-----------( 320      ( 16 Oct 2018 09:04 )             38.8     10-16    133<L>  |  96  |  26<H>  ----------------------------<  120<H>  4.6   |  29  |  1.13    Ca    9.5      16 Oct 2018 09:04    TPro  8.3  /  Alb  3.7  /  TBili  0.8  /  DBili  x   /  AST  11  /  ALT  15  /  AlkPhos  71  10-16          RADIOLOGY & ADDITIONAL STUDIES:    < from: CT Abdomen and Pelvis w/ IV Cont (10.16.18 @ 10:23) >  Small bowel obstruction with right lower abdominal transition point.    < end of copied text >      EKG Reviewed:  same as prior.

## 2018-10-17 NOTE — PROGRESS NOTE ADULT - SUBJECTIVE AND OBJECTIVE BOX
86 y/o male admitted with SBO. Patient examined at bedside, no complaints.   No nausea, no vomiting, denies abd pain   denies flatus or bm       T(F): 98.8 (10-17-18 @ 05:30), Max: 98.8 (10-17-18 @ 05:30)  HR: 85 (10-17-18 @ 06:28) (70 - 102)  BP: 129/72 (10-17-18 @ 06:28) (111/68 - 141/79)  RR: 17 (10-17-18 @ 05:30) (17 - 18)  SpO2: 99% (10-17-18 @ 05:30) (97% - 99%)  Wt(kg): --      10-16 @ 07:01  -  10-17 @ 07:00  --------------------------------------------------------  IN:    sodium chloride 0.9%.: 700 mL  Total IN: 700 mL    OUT:    Nasoenteral Tube: 100 mL  Total OUT: 100 mL    Total NET: 600 mL                            13.1   15.9  )-----------( 320      ( 16 Oct 2018 09:04 )             38.8   10-16    133<L>  |  96  |  26<H>  ----------------------------<  120<H>  4.6   |  29  |  1.13    Ca    9.5      16 Oct 2018 09:04    TPro  8.3  /  Alb  3.7  /  TBili  0.8  /  DBili  x   /  AST  11  /  ALT  15  /  AlkPhos  71  10-16          Physical Exam  General: AAOx3, No acute distress, NGT in place   Skin: No jaundice, no icterus  Abdomen: soft, nontender, nondistended, no rebound tenderness, no guarding, no palpable masses  : Normal external genitalia  Extremities: non edematous, no calf pain bilaterally      < from: CT Abdomen and Pelvis w/ IV Cont (10.16.18 @ 10:23) >    EXAM:  CT ABDOMEN AND PELVIS IC                            PROCEDURE DATE:  10/16/2018          INTERPRETATION:  CLINICAL INFORMATION: Vomiting    COMPARISON: 6/28/2017    PROCEDURE:   CT of the Abdomen and Pelvis was performed with intravenous contrast.   Intravenous contrast: 97 ml Omnipaque 350. 3 ml discarded.  Oral contrast: None.  Sagittal and coronal reformats were performed.    FINDINGS:    LOWER CHEST: Lung base fibrotic changes (subpleural cysts). Coronary   artery calcifications.    LIVER: Multiple subcentimeter low attenuating lesions are present in the   liver, too small to further characterize.  BILE DUCTS: Normal caliber.  GALLBLADDER: Within normal limits.  SPLEEN: Within normal limits.  PANCREAS: Within normal limits.  ADRENALS: Within normal limits.  KIDNEYS/URETERS: Renal cysts measuring up to 9.4 cm. Additional   subcentimeter hypodense renal lesions are too small to further   characterize. No hydronephrosis.    BLADDER: Mild wall thickening.  REPRODUCTIVE ORGANS: The prostate is enlarged.    BOWEL: Distended small bowel with right lower abdominal transition.   Distal small bowel is collapsed. Status post right hemicolectomy.     PERITONEUM: No ascites.  VESSELS:  Atherosclerotic changes.  RETROPERITONEUM: No lymphadenopathy.    ABDOMINAL WALL: Small umbilical hernia containing small bowel.  BONES: Spinal degenerative changes. Grade 1 spondylolisthesis of L4 over   L5.    IMPRESSION:     Small bowel obstruction with right lower abdominal transition point.                    JAY LICEA M.D., ATTENDING RADIOLOGIST  This document has been electronically signed. Oct 16 2018 10:47AM    < end of copied text >

## 2018-10-18 PROCEDURE — 99233 SBSQ HOSP IP/OBS HIGH 50: CPT

## 2018-10-18 RX ORDER — LISINOPRIL 2.5 MG/1
5 TABLET ORAL DAILY
Qty: 0 | Refills: 0 | Status: DISCONTINUED | OUTPATIENT
Start: 2018-10-18 | End: 2018-10-19

## 2018-10-18 RX ORDER — ATORVASTATIN CALCIUM 80 MG/1
20 TABLET, FILM COATED ORAL AT BEDTIME
Qty: 0 | Refills: 0 | Status: DISCONTINUED | OUTPATIENT
Start: 2018-10-18 | End: 2018-10-19

## 2018-10-18 RX ORDER — ATENOLOL 25 MG/1
25 TABLET ORAL DAILY
Qty: 0 | Refills: 0 | Status: DISCONTINUED | OUTPATIENT
Start: 2018-10-18 | End: 2018-10-19

## 2018-10-18 RX ADMIN — SODIUM CHLORIDE 100 MILLILITER(S): 9 INJECTION INTRAMUSCULAR; INTRAVENOUS; SUBCUTANEOUS at 21:51

## 2018-10-18 RX ADMIN — ATENOLOL 25 MILLIGRAM(S): 25 TABLET ORAL at 15:16

## 2018-10-18 RX ADMIN — Medication 1.25 MILLIGRAM(S): at 05:40

## 2018-10-18 RX ADMIN — Medication 5 MILLIGRAM(S): at 05:40

## 2018-10-18 RX ADMIN — LISINOPRIL 5 MILLIGRAM(S): 2.5 TABLET ORAL at 15:16

## 2018-10-18 RX ADMIN — HEPARIN SODIUM 5000 UNIT(S): 5000 INJECTION INTRAVENOUS; SUBCUTANEOUS at 05:40

## 2018-10-18 RX ADMIN — SODIUM CHLORIDE 100 MILLILITER(S): 9 INJECTION INTRAMUSCULAR; INTRAVENOUS; SUBCUTANEOUS at 05:39

## 2018-10-18 RX ADMIN — PANTOPRAZOLE SODIUM 40 MILLIGRAM(S): 20 TABLET, DELAYED RELEASE ORAL at 12:45

## 2018-10-18 RX ADMIN — ATORVASTATIN CALCIUM 20 MILLIGRAM(S): 80 TABLET, FILM COATED ORAL at 21:51

## 2018-10-18 RX ADMIN — HEPARIN SODIUM 5000 UNIT(S): 5000 INJECTION INTRAVENOUS; SUBCUTANEOUS at 21:51

## 2018-10-18 RX ADMIN — HEPARIN SODIUM 5000 UNIT(S): 5000 INJECTION INTRAVENOUS; SUBCUTANEOUS at 15:16

## 2018-10-18 RX ADMIN — Medication 1.25 MILLIGRAM(S): at 00:16

## 2018-10-18 RX ADMIN — Medication 5 MILLIGRAM(S): at 00:16

## 2018-10-18 NOTE — PROGRESS NOTE ADULT - ASSESSMENT
86 y/o male admitted with SBO    1. NGT to bedside bag if minimal output then will d/c ngt and start clears   2. d/c tele   3. oob   4. IVF hydration

## 2018-10-18 NOTE — PROGRESS NOTE ADULT - SUBJECTIVE AND OBJECTIVE BOX
86 y/o male admitted with SBO. Patient examined at bedside, no complaints.   No nausea, no vomiting  + flatus and bm   small bowel series done yesterday, contrast remained in small bowel       T(F): 97.8 (10-18-18 @ 06:01), Max: 98.2 (10-17-18 @ 14:31)  HR: 94 (10-18-18 @ 06:01) (94 - 109)  BP: 134/80 (10-18-18 @ 06:01) (127/87 - 135/88)  RR: 16 (10-18-18 @ 06:01) (16 - 18)  SpO2: 98% (10-18-18 @ 06:01) (97% - 99%)  Wt(kg): --      10-17 @ 07:01  -  10-18 @ 07:00  --------------------------------------------------------  IN:    sodium chloride 0.9%.: 1200 mL  Total IN: 1200 mL    OUT:    Nasoenteral Tube: 475 mL  Total OUT: 475 mL    Total NET: 725 mL                          11.8   10.9  )-----------( 288      ( 17 Oct 2018 10:08 )             35.5   10-17    137  |  102  |  36<H>  ----------------------------<  102<H>  4.3   |  29  |  0.96    Ca    9.0      17 Oct 2018 10:08  Phos  3.3     10-17  Mg     2.1     10-17    TPro  8.3  /  Alb  3.7  /  TBili  0.8  /  DBili  x   /  AST  11  /  ALT  15  /  AlkPhos  71  10-16            Physical Exam  General: AAOx3, No acute distress, NGt in place with 475 bilious output   Skin: No jaundice, no icterus  Abdomen: soft, nontender, nondistended, no rebound tenderness, no guarding, no palpable masses  : Normal external genitalia  Extremities: non edematous, no calf pain bilaterally      < from: Xray Small Bowel Series (10.17.18 @ 16:15) >    EXAM:  SMALL BOWEL ONLY                            PROCEDURE DATE:  10/17/2018          INTERPRETATION:  CLINICAL STATEMENT: Small bowel obstruction    TECHNIQUE: Small bowel series was performed.    COMPARISON: 10/17/2018 abdominal x-ray 9:09 AM    FINDINGS:  There is contrast opacifying the stomach and small bowel. Feeding tube   noted tip overlying the right upper quadrant. Dilated small bowel loops   noted measuring up to 3.8 cm in caliber in the pelvis. At 60 minutes,   contrast has not reached the colon. Repeat abdominal x-ray to be   performed at 2 and 3 hour frames.    IMPRESSION:  Findings compatible with small bowel obstruction. Continued serial x-rays   to be performed to determine partial versus complete small bowel   obstruction.                AFSHIN GARCIA M.D., ATTENDING RADIOLOGIST  This document has been electronically signed. Oct 17 2018  4:27PM        < end of copied text >

## 2018-10-18 NOTE — DIETITIAN INITIAL EVALUATION ADULT. - PROBLEM SELECTOR PLAN 1
NGT to continuous low wall suction  serial abdominal exams  await bowel function  monitor leukocytosis

## 2018-10-18 NOTE — PROGRESS NOTE ADULT - SUBJECTIVE AND OBJECTIVE BOX
Medically stable. Little NG fluids. No pain.    VS: Stable.    Lungs: Clear.  Soniya: NSRS4  Abd: Soft  No edema  Neuro: A & O x 3.    LABS: wnl.

## 2018-10-18 NOTE — DIETITIAN INITIAL EVALUATION ADULT. - PERTINENT LABORATORY DATA
10-17 Na137 mmol/L Glu 102 mg/dL<H> K+ 4.3 mmol/L Cr  0.96 mg/dL BUN 36 mg/dL<H> 10-17 Phos 3.3 mg/dL 10-16 Alb 3.7 g/dL

## 2018-10-19 ENCOUNTER — TRANSCRIPTION ENCOUNTER (OUTPATIENT)
Age: 83
End: 2018-10-19

## 2018-10-19 VITALS
TEMPERATURE: 98 F | SYSTOLIC BLOOD PRESSURE: 98 MMHG | OXYGEN SATURATION: 98 % | RESPIRATION RATE: 18 BRPM | DIASTOLIC BLOOD PRESSURE: 53 MMHG | HEART RATE: 68 BPM

## 2018-10-19 PROCEDURE — 99238 HOSP IP/OBS DSCHRG MGMT 30/<: CPT

## 2018-10-19 RX ORDER — SODIUM CHLORIDE 9 MG/ML
500 INJECTION INTRAMUSCULAR; INTRAVENOUS; SUBCUTANEOUS ONCE
Qty: 0 | Refills: 0 | Status: COMPLETED | OUTPATIENT
Start: 2018-10-19 | End: 2018-10-19

## 2018-10-19 RX ADMIN — HEPARIN SODIUM 5000 UNIT(S): 5000 INJECTION INTRAVENOUS; SUBCUTANEOUS at 05:25

## 2018-10-19 RX ADMIN — PANTOPRAZOLE SODIUM 40 MILLIGRAM(S): 20 TABLET, DELAYED RELEASE ORAL at 12:36

## 2018-10-19 RX ADMIN — SODIUM CHLORIDE 1000 MILLILITER(S): 9 INJECTION INTRAMUSCULAR; INTRAVENOUS; SUBCUTANEOUS at 09:02

## 2018-10-19 NOTE — DISCHARGE NOTE ADULT - HOSPITAL COURSE
Pt was admitted with a small bowel obstruction and elevated leukocytosis. He was treated conservatively with an NGT and daily AXRs until he started to pass flatus and bms without surgical intervention. He eventually was advanced to a reg diet and was discharged once he tolerated it.

## 2018-10-19 NOTE — PROGRESS NOTE ADULT - SUBJECTIVE AND OBJECTIVE BOX
85y Male with no overnight complaints. Tolerating clears  +flatus/bm    Vital Signs:  T(C): 36.7 (10-19-18 @ 05:31), Max: 37.1 (10-18-18 @ 14:12)  HR: 51 (10-19-18 @ 05:31) (51 - 107)  BP: 104/55 (10-19-18 @ 05:31) (101/59 - 120/77)  RR: 16 (10-19-18 @ 05:31) (16 - 18)  SpO2: 99% (10-19-18 @ 05:31) (99% - 100%)  Wt(kg): --    Physical Exam:  General: NAD, comfortable  Abdomen: soft, NT/ND

## 2018-10-19 NOTE — DISCHARGE NOTE ADULT - MEDICATION SUMMARY - MEDICATIONS TO TAKE
I will START or STAY ON the medications listed below when I get home from the hospital:    Out Patient Physical Therapy   -- Indication: For As previously perscribed    lisinopril 5 mg oral tablet  -- 1 tab(s) by mouth once a day  -- Indication: For As previously perscribed    atenolol 25 mg oral tablet  -- 1 tab(s) by mouth once a day  -- Indication: For As previously perscribed    NexIUM 40 mg oral delayed release capsule  -- 1 cap(s) by mouth once a day  -- Indication: For As previously perscribed

## 2018-10-19 NOTE — DISCHARGE NOTE ADULT - CARE PROVIDER_API CALL
Marcus Price (MD), Surgery  [address], [place] [id number]  Phone: [phone]  Fax: [phone]

## 2018-10-19 NOTE — DISCHARGE NOTE ADULT - CARE PLAN
Principal Discharge DX:	SBO (small bowel obstruction)  Goal:	to tolerate a diet and have bowel function  Assessment and plan of treatment:	cont current diet, may shower. follow up with Dr Price in 1-2 weeks

## 2018-10-19 NOTE — PROGRESS NOTE ADULT - ATTENDING COMMENTS
Plan: Continue NG tube, small draining.  Await SBS.  Discussed with daughter and wife.
Plan: Possible D/C NG tube today. Advance diet. Discussed with wife and daughter.
pt examined. No abdominal pain. Having bm and passing flatus
pt seen in am  had bm  D/C ngt if there is no out put
pt seen with SBO. Also discussed with the family about the plan of treatment. Similar episode about 1 year ago  Keep POOJA NÚÑEZ  Saw the pt in am

## 2018-10-19 NOTE — DISCHARGE NOTE ADULT - PLAN OF CARE
to tolerate a diet and have bowel function cont current diet, may shower. follow up with Dr Price in 1-2 weeks

## 2018-10-19 NOTE — PROGRESS NOTE ADULT - REASON FOR ADMISSION
small bowel obstruction

## 2018-10-19 NOTE — DISCHARGE NOTE ADULT - PATIENT PORTAL LINK FT
You can access the [facility] Patient Portal, offered by [facility], by registering with the following website: [email or web address]

## 2018-10-26 PROBLEM — Z00.00 ENCOUNTER FOR PREVENTIVE HEALTH EXAMINATION: Status: ACTIVE | Noted: 2018-10-26

## 2018-10-26 PROBLEM — Z87.19 HISTORY OF GASTRITIS: Status: RESOLVED | Noted: 2018-10-26 | Resolved: 2018-10-26

## 2018-10-26 PROBLEM — K56.609 SMALL BOWEL OBSTRUCTION: Status: ACTIVE | Noted: 2018-10-26

## 2018-10-26 PROBLEM — Z86.79 HISTORY OF ESSENTIAL HYPERTENSION: Status: RESOLVED | Noted: 2018-10-26 | Resolved: 2018-10-26

## 2018-10-30 ENCOUNTER — APPOINTMENT (OUTPATIENT)
Dept: SURGERY | Facility: CLINIC | Age: 83
End: 2018-10-30
Payer: MEDICARE

## 2018-10-30 VITALS
BODY MASS INDEX: 19.88 KG/M2 | HEART RATE: 109 BPM | DIASTOLIC BLOOD PRESSURE: 79 MMHG | SYSTOLIC BLOOD PRESSURE: 132 MMHG | WEIGHT: 142 LBS | HEIGHT: 71 IN

## 2018-10-30 DIAGNOSIS — K56.609 UNSPECIFIED INTESTINAL OBSTRUCTION, UNSPECIFIED AS TO PARTIAL VERSUS COMPLETE OBSTRUCTION: ICD-10-CM

## 2018-10-30 DIAGNOSIS — Z87.19 PERSONAL HISTORY OF OTHER DISEASES OF THE DIGESTIVE SYSTEM: ICD-10-CM

## 2018-10-30 DIAGNOSIS — Z86.79 PERSONAL HISTORY OF OTHER DISEASES OF THE CIRCULATORY SYSTEM: ICD-10-CM

## 2018-10-30 PROCEDURE — 99214 OFFICE O/P EST MOD 30 MIN: CPT

## 2018-11-07 PROBLEM — Z87.891 FORMER SMOKER: Status: ACTIVE | Noted: 2018-10-30

## 2018-11-11 PROCEDURE — 83735 ASSAY OF MAGNESIUM: CPT

## 2018-11-11 PROCEDURE — 80053 COMPREHEN METABOLIC PANEL: CPT

## 2018-11-11 PROCEDURE — 83690 ASSAY OF LIPASE: CPT

## 2018-11-11 PROCEDURE — 82378 CARCINOEMBRYONIC ANTIGEN: CPT

## 2018-11-11 PROCEDURE — 74019 RADEX ABDOMEN 2 VIEWS: CPT

## 2018-11-11 PROCEDURE — 71045 X-RAY EXAM CHEST 1 VIEW: CPT

## 2018-11-11 PROCEDURE — 74177 CT ABD & PELVIS W/CONTRAST: CPT

## 2018-11-11 PROCEDURE — 74250 X-RAY XM SM INT 1CNTRST STD: CPT

## 2018-11-11 PROCEDURE — 85027 COMPLETE CBC AUTOMATED: CPT

## 2018-11-11 PROCEDURE — 84100 ASSAY OF PHOSPHORUS: CPT

## 2018-11-11 PROCEDURE — 93005 ELECTROCARDIOGRAM TRACING: CPT

## 2018-11-11 PROCEDURE — 80048 BASIC METABOLIC PNL TOTAL CA: CPT

## 2018-11-11 PROCEDURE — 99285 EMERGENCY DEPT VISIT HI MDM: CPT | Mod: 25

## 2018-11-11 PROCEDURE — 84484 ASSAY OF TROPONIN QUANT: CPT

## 2018-11-11 PROCEDURE — 82550 ASSAY OF CK (CPK): CPT

## 2018-11-11 PROCEDURE — 82553 CREATINE MB FRACTION: CPT

## 2018-11-11 PROCEDURE — 83605 ASSAY OF LACTIC ACID: CPT

## 2018-11-12 ENCOUNTER — INPATIENT (INPATIENT)
Facility: HOSPITAL | Age: 83
LOS: 1 days | Discharge: HOME HEALTH | End: 2018-11-14
Attending: HOSPITALIST | Admitting: HOSPITALIST
Payer: MEDICARE

## 2018-11-12 ENCOUNTER — APPOINTMENT (OUTPATIENT)
Dept: SURGERY | Facility: CLINIC | Age: 83
End: 2018-11-12

## 2018-11-12 VITALS
TEMPERATURE: 98 F | OXYGEN SATURATION: 95 % | RESPIRATION RATE: 20 BRPM | DIASTOLIC BLOOD PRESSURE: 78 MMHG | SYSTOLIC BLOOD PRESSURE: 133 MMHG | HEART RATE: 120 BPM

## 2018-11-12 DIAGNOSIS — K22.70 BARRETT'S ESOPHAGUS WITHOUT DYSPLASIA: ICD-10-CM

## 2018-11-12 DIAGNOSIS — I10 ESSENTIAL (PRIMARY) HYPERTENSION: ICD-10-CM

## 2018-11-12 DIAGNOSIS — K92.2 GASTROINTESTINAL HEMORRHAGE, UNSPECIFIED: ICD-10-CM

## 2018-11-12 DIAGNOSIS — Z98.890 OTHER SPECIFIED POSTPROCEDURAL STATES: Chronic | ICD-10-CM

## 2018-11-12 DIAGNOSIS — Z29.9 ENCOUNTER FOR PROPHYLACTIC MEASURES, UNSPECIFIED: ICD-10-CM

## 2018-11-12 DIAGNOSIS — Z87.891 PERSONAL HISTORY OF NICOTINE DEPENDENCE: ICD-10-CM

## 2018-11-12 DIAGNOSIS — C18.9 MALIGNANT NEOPLASM OF COLON, UNSPECIFIED: ICD-10-CM

## 2018-11-12 LAB
ALBUMIN SERPL ELPH-MCNC: 3.7 G/DL — SIGNIFICANT CHANGE UP (ref 3.3–5)
ALP SERPL-CCNC: 56 U/L — SIGNIFICANT CHANGE UP (ref 40–120)
ALT FLD-CCNC: 10 U/L — SIGNIFICANT CHANGE UP (ref 4–41)
APTT BLD: 26.3 SEC — LOW (ref 27.5–36.3)
AST SERPL-CCNC: 17 U/L — SIGNIFICANT CHANGE UP (ref 4–40)
BASE EXCESS BLDV CALC-SCNC: 2.2 MMOL/L — SIGNIFICANT CHANGE UP
BASOPHILS # BLD AUTO: 0.04 K/UL — SIGNIFICANT CHANGE UP (ref 0–0.2)
BASOPHILS NFR BLD AUTO: 0.4 % — SIGNIFICANT CHANGE UP (ref 0–2)
BILIRUB SERPL-MCNC: 0.3 MG/DL — SIGNIFICANT CHANGE UP (ref 0.2–1.2)
BLD GP AB SCN SERPL QL: NEGATIVE — SIGNIFICANT CHANGE UP
BLOOD GAS VENOUS - CREATININE: 0.84 MG/DL — SIGNIFICANT CHANGE UP (ref 0.5–1.3)
BUN SERPL-MCNC: 36 MG/DL — HIGH (ref 7–23)
CALCIUM SERPL-MCNC: 8.7 MG/DL — SIGNIFICANT CHANGE UP (ref 8.4–10.5)
CHLORIDE BLDV-SCNC: 99 MMOL/L — SIGNIFICANT CHANGE UP (ref 96–108)
CHLORIDE SERPL-SCNC: 96 MMOL/L — LOW (ref 98–107)
CO2 SERPL-SCNC: 24 MMOL/L — SIGNIFICANT CHANGE UP (ref 22–31)
CREAT SERPL-MCNC: 0.88 MG/DL — SIGNIFICANT CHANGE UP (ref 0.5–1.3)
EOSINOPHIL # BLD AUTO: 0.01 K/UL — SIGNIFICANT CHANGE UP (ref 0–0.5)
EOSINOPHIL NFR BLD AUTO: 0.1 % — SIGNIFICANT CHANGE UP (ref 0–6)
GAS PNL BLDV: 129 MMOL/L — LOW (ref 136–146)
GLUCOSE BLDV-MCNC: 123 — HIGH (ref 70–99)
GLUCOSE SERPL-MCNC: 126 MG/DL — HIGH (ref 70–99)
HCO3 BLDV-SCNC: 25 MMOL/L — SIGNIFICANT CHANGE UP (ref 20–27)
HCT VFR BLD CALC: 25.8 % — LOW (ref 39–50)
HCT VFR BLDV CALC: 26.8 % — LOW (ref 39–51)
HGB BLD-MCNC: 8.5 G/DL — LOW (ref 13–17)
HGB BLDV-MCNC: 8.6 G/DL — LOW (ref 13–17)
IMM GRANULOCYTES # BLD AUTO: 0.04 # — SIGNIFICANT CHANGE UP
IMM GRANULOCYTES NFR BLD AUTO: 0.4 % — SIGNIFICANT CHANGE UP (ref 0–1.5)
INR BLD: 1.24 — HIGH (ref 0.88–1.17)
LACTATE BLDV-MCNC: 2.2 MMOL/L — HIGH (ref 0.5–2)
LYMPHOCYTES # BLD AUTO: 1.58 K/UL — SIGNIFICANT CHANGE UP (ref 1–3.3)
LYMPHOCYTES # BLD AUTO: 15.1 % — SIGNIFICANT CHANGE UP (ref 13–44)
MCHC RBC-ENTMCNC: 32.2 PG — SIGNIFICANT CHANGE UP (ref 27–34)
MCHC RBC-ENTMCNC: 32.9 % — SIGNIFICANT CHANGE UP (ref 32–36)
MCV RBC AUTO: 97.7 FL — SIGNIFICANT CHANGE UP (ref 80–100)
MONOCYTES # BLD AUTO: 0.68 K/UL — SIGNIFICANT CHANGE UP (ref 0–0.9)
MONOCYTES NFR BLD AUTO: 6.5 % — SIGNIFICANT CHANGE UP (ref 2–14)
NEUTROPHILS # BLD AUTO: 8.11 K/UL — HIGH (ref 1.8–7.4)
NEUTROPHILS NFR BLD AUTO: 77.5 % — HIGH (ref 43–77)
NRBC # FLD: 0 — SIGNIFICANT CHANGE UP
OB PNL STL: POSITIVE — SIGNIFICANT CHANGE UP
PCO2 BLDV: 49 MMHG — SIGNIFICANT CHANGE UP (ref 41–51)
PH BLDV: 7.37 PH — SIGNIFICANT CHANGE UP (ref 7.32–7.43)
PLATELET # BLD AUTO: 248 K/UL — SIGNIFICANT CHANGE UP (ref 150–400)
PMV BLD: 9.5 FL — SIGNIFICANT CHANGE UP (ref 7–13)
PO2 BLDV: < 24 MMHG — LOW (ref 35–40)
POTASSIUM BLDV-SCNC: 4.4 MMOL/L — SIGNIFICANT CHANGE UP (ref 3.4–4.5)
POTASSIUM SERPL-MCNC: 4.6 MMOL/L — SIGNIFICANT CHANGE UP (ref 3.5–5.3)
POTASSIUM SERPL-SCNC: 4.6 MMOL/L — SIGNIFICANT CHANGE UP (ref 3.5–5.3)
PROT SERPL-MCNC: 6.6 G/DL — SIGNIFICANT CHANGE UP (ref 6–8.3)
PROTHROM AB SERPL-ACNC: 14.2 SEC — HIGH (ref 9.8–13.1)
RBC # BLD: 2.64 M/UL — LOW (ref 4.2–5.8)
RBC # FLD: 14.3 % — SIGNIFICANT CHANGE UP (ref 10.3–14.5)
RH IG SCN BLD-IMP: POSITIVE — SIGNIFICANT CHANGE UP
SAO2 % BLDV: 27.9 % — LOW (ref 60–85)
SODIUM SERPL-SCNC: 133 MMOL/L — LOW (ref 135–145)
WBC # BLD: 10.46 K/UL — SIGNIFICANT CHANGE UP (ref 3.8–10.5)
WBC # FLD AUTO: 10.46 K/UL — SIGNIFICANT CHANGE UP (ref 3.8–10.5)

## 2018-11-12 PROCEDURE — 74174 CTA ABD&PLVS W/CONTRAST: CPT | Mod: 26

## 2018-11-12 PROCEDURE — 71045 X-RAY EXAM CHEST 1 VIEW: CPT | Mod: 26

## 2018-11-12 PROCEDURE — 99223 1ST HOSP IP/OBS HIGH 75: CPT | Mod: GC

## 2018-11-12 RX ORDER — SODIUM CHLORIDE 9 MG/ML
1000 INJECTION INTRAMUSCULAR; INTRAVENOUS; SUBCUTANEOUS ONCE
Qty: 0 | Refills: 0 | Status: COMPLETED | OUTPATIENT
Start: 2018-11-12 | End: 2018-11-12

## 2018-11-12 RX ORDER — PANTOPRAZOLE SODIUM 20 MG/1
80 TABLET, DELAYED RELEASE ORAL ONCE
Qty: 0 | Refills: 0 | Status: COMPLETED | OUTPATIENT
Start: 2018-11-12 | End: 2018-11-12

## 2018-11-12 RX ORDER — PANTOPRAZOLE SODIUM 20 MG/1
40 TABLET, DELAYED RELEASE ORAL EVERY 12 HOURS
Qty: 0 | Refills: 0 | Status: DISCONTINUED | OUTPATIENT
Start: 2018-11-13 | End: 2018-11-13

## 2018-11-12 RX ADMIN — SODIUM CHLORIDE 1000 MILLILITER(S): 9 INJECTION INTRAMUSCULAR; INTRAVENOUS; SUBCUTANEOUS at 14:46

## 2018-11-12 RX ADMIN — PANTOPRAZOLE SODIUM 80 MILLIGRAM(S): 20 TABLET, DELAYED RELEASE ORAL at 13:53

## 2018-11-12 RX ADMIN — SODIUM CHLORIDE 1000 MILLILITER(S): 9 INJECTION INTRAMUSCULAR; INTRAVENOUS; SUBCUTANEOUS at 13:46

## 2018-11-12 NOTE — H&P ADULT - HISTORY OF PRESENT ILLNESS
84yo male with PMH of colon cancer, multiple SBO presenting with generalized weakness, exertional SOB, and black stools. He was discharged 2 weeks ago after being treated non-surgically for a SBO. Since he was discharged he has been having black stools. He came in today because he was so weak he could not stand up. Denies fevers, chills, chest pain, cough, abdominal pain, n/v. 84yo male with PMH of colon cancer s/p R hemicolectomy 10 yrs ago, multiple SBO, RBBB presenting with generalized weakness, exertional SOB, and black stools. He was discharged 2 weeks ago after being treated non-surgically for a SBO. Since he was discharged he has been having black stools. He reports his internist also noted anemia on his labs. He came in today because he was so weak he could not stand up. Also endorses diarrhea since yesterday. Regarding his h/o colon cancer, he reports undergoing R hemicolectomy 10 yrs ago as well as radiation, denies recurrence since then. Reports SBO 1 yr ago and 1 month ago, both managed medically w NGT without requiring surgery. Denies nausea, vomiting, fevers, chills, chest pain, SOB, cough, abdominal pain, recent travel, h/o H pylori infection, or use of NSAIDs. In ED, vitals were T 98.6, , /82, RR 16, SpO2 99% on RA. Labs notable for positive FOBT and Hb 8.5, decreased from 11.8 on 10/18. Pt was given 1 L NS and 1U pRBC, admitted to medicine for further work-up.

## 2018-11-12 NOTE — ED PROVIDER NOTE - MEDICAL DECISION MAKING DETAILS
85y male with a GI bleed.  Concern for bowel necrosis from bowel ischemia. Labs, protonix, cta ab/pelvis.

## 2018-11-12 NOTE — ED PROVIDER NOTE - OBJECTIVE STATEMENT
85y male with PMH of colon cancer, multiple SBO presenting with generalized weakness, exertional SOB and black stools.  He was discharged 2 weeks ago after being treated non-surgically for a SBO.  Since he was discharged he has been having black stools.  He came in today because he was so weak he could not stand up.  Denies: fevers, chills, chest pain, cough, abdominal pain, n/v/d.  Not on blood thinners.

## 2018-11-12 NOTE — H&P ADULT - ATTENDING COMMENTS
Patient seen and examined, d/w HS (Dr. Brumfield and Dr. Watson), agree with above.    84 yo M with hx colon cancer s/p R hemicolectomy, admitted with acute blood loss anemia (hgb 8.5 from 11.8 on Oct 17th) likely 2/2 UGIB (+melena, BUN/CR >30). S/p 1U pRBC, continue to trend hgb, transfuse goal hgb >7, GI consult re: ?possible endoscopy for further evaluation, c/w protonix bid.

## 2018-11-12 NOTE — ED ADULT NURSE NOTE - OBJECTIVE STATEMENT
Brooklynn RN note: Pt. A&Ox3, brought into rm 18 for evaluation of sob, weakness and dark stools starting this AM. States he was hospitalized 2 weeks ago for SOB. h/o colon ca years ago and has had a few colon obstructions since. As per wife, pt. has had dark stools (almost black in color) since last admission. Denies any abdominal pain, n/v/d or fevers. Last BM was this AM. Abdomen is soft, non-distended and non-tender on palpation. #20g IV placed in right arm, labs sent as ordered. MD at bedside for eval. VS done as charted, breathing well on RA. Will continue to monitor.

## 2018-11-12 NOTE — ED ADULT NURSE NOTE - NSIMPLEMENTINTERV_GEN_ALL_ED
Implemented All Fall Risk Interventions:  [place] to call system. Call bell, personal items and telephone within reach. Instruct patient to call for assistance. Room bathroom lighting operational. Non-slip footwear when patient is off stretcher. Physically safe environment: no spills, clutter or unnecessary equipment. Stretcher in lowest position, wheels locked, appropriate side rails in place. Provide visual cue, wrist band, yellow gown, etc. Monitor gait and stability. Monitor for mental status changes and reorient to person, place, and time. Review medications for side effects contributing to fall risk. Reinforce activity limits and safety measures with patient and family.

## 2018-11-12 NOTE — H&P ADULT - PROBLEM SELECTOR PLAN 1
GI bleed - Likely UGIB given melana and elevated BUN. Now s/p 1L IVF and 1U pRBC in ED. Hemodynamically stable. Hold off on IVF as pt is stable w crackles on lung exam.  - Monitor CBC q8h, transfuse for Hb < 7  - GI consult in am  - Clear liquid diet  - NPO at midnight  - Pantoprazole 40 q12h (hold home nexium 40 qD)  - Monitor BMs GI bleed - Likely UGIB given melana and elevated BUN/Cr ratio (>30). Now s/p 1L IVF and 1U pRBC in ED. Hemodynamically stable. Not orthostatic. Hold off on IVF as pt is stable w crackles on lung exam.  - Monitor CBC q8h, transfuse for Hb < 7  - GI consult in am  for possible endoscopy  - Clear liquid diet  - NPO at midnight  - Pantoprazole 40 q12h (hold home nexium 40 qD)  - Monitor BMs

## 2018-11-12 NOTE — H&P ADULT - NSHPLABSRESULTS_GEN_ALL_CORE
LABS:                        8.5    10.46 )-----------( 248      ( 12 Nov 2018 12:50 )             25.8     Hgb Trend: 8.5<--  11-12    133<L>  |  96<L>  |  36<H>  ----------------------------<  126<H>  4.6   |  24  |  0.88    Ca    8.7      12 Nov 2018 12:50    TPro  6.6  /  Alb  3.7  /  TBili  0.3  /  DBili  x   /  AST  17  /  ALT  10  /  AlkPhos  56  11-12    Creatinine Trend: 0.88<--, 0.96<--, 1.13<--  PT/INR - ( 12 Nov 2018 14:00 )   PT: 14.2 SEC;   INR: 1.24       PTT - ( 12 Nov 2018 14:00 )  PTT:26.3 SEC    Venous Blood Gas:  11-12 @ 12:50  7.37/49/< 24/25/27.9  VBG Lactate: 2.2 LABS and imaging personally reviewed.                         8.5    10.46 )-----------( 248      ( 12 Nov 2018 12:50 )             25.8     Hgb Trend: 8.5<--  11-12    133<L>  |  96<L>  |  36<H>  ----------------------------<  126<H>  4.6   |  24  |  0.88    Ca    8.7      12 Nov 2018 12:50    TPro  6.6  /  Alb  3.7  /  TBili  0.3  /  DBili  x   /  AST  17  /  ALT  10  /  AlkPhos  56  11-12    Creatinine Trend: 0.88<--, 0.96<--, 1.13<--  PT/INR - ( 12 Nov 2018 14:00 )   PT: 14.2 SEC;   INR: 1.24       PTT - ( 12 Nov 2018 14:00 )  PTT:26.3 SEC    Venous Blood Gas:  11-12 @ 12:50  7.37/49/< 24/25/27.9  VBG Lactate: 2.2    < from: CT Angio Abdomen and Pelvis w/ IV Cont (11.12.18 @ 14:57) >    FINDINGS:    LOWER CHEST: Coronary artery and aortic valve annular calcifications.   Unchanged fibrotic changes of the lung with traction bronchiectasis.   Small amount of fluid in the distal esophagus.    LIVER: Subcentimeter hypoattenuating lesions in the right hepatic lobe   are too small to characterize.  BILE DUCTS: Normal caliber.  GALLBLADDER: Within normal limits.  SPLEEN: Within normal limits.  PANCREAS: Within normal limits.  ADRENALS: Within normal limits.  KIDNEYS/URETERS: Bilateral renal cysts. Additional bilateral   subcentimeter hypodense lesions are too small to characterize. No   hydronephrosis.    BLADDER: Within normal limits.  REPRODUCTIVE ORGANS: Mild prostate enlargement.    BOWEL: No evidence of active gastrointestinal bleeding. No bowel   obstruction. Status post right hemicolectomy.   PERITONEUM: No ascites.  VESSELS:  Atherosclerotic changes. 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  RETROPERITONEUM: No lymphadenopathy.    ABDOMINAL WALL: Small umbilical hernia containing nonobstructed small   bowel. Nonspecific edema overlying the right buttock (series 5 image 121).  BONES: Degenerative changes of the spine. Stable grade I  anterolisthesis   of L4 on L5.    IMPRESSION:     No evidence of active gastrointestinal bleeding.     Status post right hemicolectomy without bowel obstruction.    < end of copied text >

## 2018-11-12 NOTE — H&P ADULT - ASSESSMENT
GI bleed - Likely UGIB given melana and elevated BUN.   - GI consult in am  - Clear liquid diet  - NPO at midnight  - Protonix (hold home nexium 40)  - Monitor BMs    HTN  - Hold home atenolol 25 and lisinopril 5  - Monitor BP    Colon ca s/p R hemicolectomy c/b SBO - Tolerating PO, no n/v  - Outpt GI f/u    Douglas's esophagus  - Outpt GI f/u    Prophylactic measures:  - DVT ppx: SCDs i/s/o GIB  - Continue home vit D3 1000 U and B 84yo male with PMH of colon cancer s/p R hemicolectomy 10 yrs ago, multiple SBO, RBBB presenting with generalized weakness, exertional SOB, diarrhea, and black stools, likely upper GI bleed now s/p 1U pRBC. 86 yo male with PMH of colon cancer s/p R hemicolectomy 10 yrs ago, multiple SBO, RBBB presenting with generalized weakness, exertional SOB, diarrhea, and black stools, admitted with acute blood loss anemia likely 2/2 upper GI bleed now s/p 1U pRBC.

## 2018-11-12 NOTE — H&P ADULT - PROBLEM SELECTOR PLAN 2
BP well-controlled  - Hold home atenolol 25 and lisinopril 5  - Monitor BP BP well-controlled  - Hold home atenolol 25 and lisinopril 5 at this time  - Monitor BP

## 2018-11-12 NOTE — H&P ADULT - PROBLEM SELECTOR PLAN 5
- DVT ppx: SCDs i/s/o GIB  - Continue home vit D3 1000 U and B - DVT ppx: SCDs in setting of GIB  - Continue home vit D3 1000 U and B

## 2018-11-12 NOTE — ED PROVIDER NOTE - ATTENDING CONTRIBUTION TO CARE
Octavia WILKES- 86 Y/O M with h/o colon ca s/p resection 10 yrs ago, sbo x2 managed with conservative mx, last colonoscopy 1.5 yr ago p/w dark stool with blood mixed with blood and feels dizzy, lightheaded and tired, no gross blood w/o stool, no nausea, vomiting, diarrhea, fever,  chills, abd pain    pt is alert, well appearing male, s1s2 normal reg, b/l clear breath sounds, abd soft, nt, nd, normal bowel sounds, rectal exam- black stool , no gross blood or hemorrhoid, neuro exam aox3, cn 2-12 intact, no focal deficits, skin warm, dry, good turgor    plan to check labs, treat with fluids and prbc as there is a drop in h/h and also do cta abd/ pelvis with iv contrast to evaluate for site of GI bleed

## 2018-11-13 ENCOUNTER — RESULT REVIEW (OUTPATIENT)
Age: 83
End: 2018-11-13

## 2018-11-13 LAB
BUN SERPL-MCNC: 22 MG/DL — SIGNIFICANT CHANGE UP (ref 7–23)
CALCIUM SERPL-MCNC: 8.8 MG/DL — SIGNIFICANT CHANGE UP (ref 8.4–10.5)
CHLORIDE SERPL-SCNC: 103 MMOL/L — SIGNIFICANT CHANGE UP (ref 98–107)
CO2 SERPL-SCNC: 25 MMOL/L — SIGNIFICANT CHANGE UP (ref 22–31)
CREAT SERPL-MCNC: 0.97 MG/DL — SIGNIFICANT CHANGE UP (ref 0.5–1.3)
GLUCOSE SERPL-MCNC: 98 MG/DL — SIGNIFICANT CHANGE UP (ref 70–99)
HCT VFR BLD CALC: 24 % — LOW (ref 39–50)
HCT VFR BLD CALC: 25.3 % — LOW (ref 39–50)
HGB BLD-MCNC: 8.1 G/DL — LOW (ref 13–17)
HGB BLD-MCNC: 8.7 G/DL — LOW (ref 13–17)
MAGNESIUM SERPL-MCNC: 1.9 MG/DL — SIGNIFICANT CHANGE UP (ref 1.6–2.6)
MCHC RBC-ENTMCNC: 31.4 PG — SIGNIFICANT CHANGE UP (ref 27–34)
MCHC RBC-ENTMCNC: 32.5 PG — SIGNIFICANT CHANGE UP (ref 27–34)
MCHC RBC-ENTMCNC: 33.8 % — SIGNIFICANT CHANGE UP (ref 32–36)
MCHC RBC-ENTMCNC: 34.4 % — SIGNIFICANT CHANGE UP (ref 32–36)
MCV RBC AUTO: 93 FL — SIGNIFICANT CHANGE UP (ref 80–100)
MCV RBC AUTO: 94.4 FL — SIGNIFICANT CHANGE UP (ref 80–100)
NRBC # FLD: 0 — SIGNIFICANT CHANGE UP
NRBC # FLD: 0 — SIGNIFICANT CHANGE UP
PHOSPHATE SERPL-MCNC: 2.8 MG/DL — SIGNIFICANT CHANGE UP (ref 2.5–4.5)
PLATELET # BLD AUTO: 202 K/UL — SIGNIFICANT CHANGE UP (ref 150–400)
PLATELET # BLD AUTO: 216 K/UL — SIGNIFICANT CHANGE UP (ref 150–400)
PMV BLD: 9.3 FL — SIGNIFICANT CHANGE UP (ref 7–13)
PMV BLD: 9.7 FL — SIGNIFICANT CHANGE UP (ref 7–13)
POTASSIUM SERPL-MCNC: 3.8 MMOL/L — SIGNIFICANT CHANGE UP (ref 3.5–5.3)
POTASSIUM SERPL-SCNC: 3.8 MMOL/L — SIGNIFICANT CHANGE UP (ref 3.5–5.3)
RBC # BLD: 2.58 M/UL — LOW (ref 4.2–5.8)
RBC # BLD: 2.68 M/UL — LOW (ref 4.2–5.8)
RBC # FLD: 14.7 % — HIGH (ref 10.3–14.5)
RBC # FLD: 15 % — HIGH (ref 10.3–14.5)
SODIUM SERPL-SCNC: 138 MMOL/L — SIGNIFICANT CHANGE UP (ref 135–145)
WBC # BLD: 7.89 K/UL — SIGNIFICANT CHANGE UP (ref 3.8–10.5)
WBC # BLD: 8.13 K/UL — SIGNIFICANT CHANGE UP (ref 3.8–10.5)
WBC # FLD AUTO: 7.89 K/UL — SIGNIFICANT CHANGE UP (ref 3.8–10.5)
WBC # FLD AUTO: 8.13 K/UL — SIGNIFICANT CHANGE UP (ref 3.8–10.5)

## 2018-11-13 PROCEDURE — 88312 SPECIAL STAINS GROUP 1: CPT | Mod: 26

## 2018-11-13 PROCEDURE — 99222 1ST HOSP IP/OBS MODERATE 55: CPT | Mod: GC,25

## 2018-11-13 PROCEDURE — 99233 SBSQ HOSP IP/OBS HIGH 50: CPT | Mod: GC

## 2018-11-13 PROCEDURE — 88305 TISSUE EXAM BY PATHOLOGIST: CPT | Mod: 26

## 2018-11-13 PROCEDURE — 43239 EGD BIOPSY SINGLE/MULTIPLE: CPT | Mod: GC

## 2018-11-13 RX ORDER — PANTOPRAZOLE SODIUM 20 MG/1
8 TABLET, DELAYED RELEASE ORAL
Qty: 80 | Refills: 0 | Status: DISCONTINUED | OUTPATIENT
Start: 2018-11-13 | End: 2018-11-13

## 2018-11-13 RX ORDER — PANTOPRAZOLE SODIUM 20 MG/1
40 TABLET, DELAYED RELEASE ORAL
Qty: 0 | Refills: 0 | Status: DISCONTINUED | OUTPATIENT
Start: 2018-11-13 | End: 2018-11-14

## 2018-11-13 RX ADMIN — PANTOPRAZOLE SODIUM 40 MILLIGRAM(S): 20 TABLET, DELAYED RELEASE ORAL at 02:00

## 2018-11-13 RX ADMIN — PANTOPRAZOLE SODIUM 40 MILLIGRAM(S): 20 TABLET, DELAYED RELEASE ORAL at 17:22

## 2018-11-13 NOTE — CONSULT NOTE ADULT - ASSESSMENT
Impression:  1. Dark stool, anemia with drop in hemoglobin - DDx: erosive gastropathy/PUD, erosive esophagitis, angioectasia of small bowel, esophageal cancer  2. History of colon cancer s/p R hemicolectomy and chemotherapy 12 years ago    Plan:  - PPI drip  - Serial CBC, transfuse to keep Hb > 7  - NPO  - Plan for EGD today  - Obtain outpatient EGD/colonoscopy/pathology reports

## 2018-11-13 NOTE — PROGRESS NOTE ADULT - PROBLEM SELECTOR PLAN 1
GI bleed - Likely UGIB given melana and elevated BUN/Cr ratio (>30). Now s/p 1L IVF and 1U pRBC in ED. Hemodynamically stable. Not orthostatic. Hold off on IVF as pt is stable w crackles on lung exam.  - Monitor CBC q8h, transfuse for Hb < 7  - GI consult in am  for possible endoscopy  - Clear liquid diet  - NPO at midnight  - Pantoprazole 40 q12h (hold home nexium 40 qD)  - Monitor BMs GI bleed - Likely UGIB given melana and elevated BUN/Cr ratio (>30). CT A/P w/o clear source of bleed. Now s/p 1L IVF and 1U pRBC in ED. Hemodynamically stable. Not orthostatic. Hold off on IVF as pt is stable w crackles on lung exam. Hg increased from 8.5 to 8.7 s/p 1U pRBC.  - Monitor CBC q8h, transfuse for Hb < 7  - NPO for possible endoscopy today  - Pantoprazole 40 q12h (hold home nexium 40 qD)  - Monitor BMs  - GI recs appreciated

## 2018-11-13 NOTE — PROGRESS NOTE ADULT - SUBJECTIVE AND OBJECTIVE BOX
INTERVAL EVENTS / SUBJECTIVE:    Transfused 1U pRBC. No acute events overnight. Pt seen and examined at bedside this am. Reports feeling tired. Denies any BMs since melana yesterday morning.      OBJECTIVE:  Vital Signs Last 24 Hrs  T(C): 36.6 (13 Nov 2018 05:39), Max: 37.2 (12 Nov 2018 16:55)  T(F): 97.8 (13 Nov 2018 05:39), Max: 98.9 (12 Nov 2018 16:55)  HR: 96 (13 Nov 2018 05:39) (96 - 120)  BP: 120/75 (13 Nov 2018 05:39) (120/75 - 140/89)  BP(mean): --  RR: 16 (13 Nov 2018 05:39) (16 - 20)  SpO2: 100% (13 Nov 2018 05:39) (95% - 100%)      PHYSICAL EXAM:  	General: Alert and cooperative. No acute distress.  	Head: Atraumatic/Normocephalic, no mass or lesions.  	Eyes: PERRL, clear conjunctiva, EOMI, no ptosis.   	Neck: No lymphadenopathy, no JVD.   	Respiratory: Mild bibasilar crackles. No wheezes or rhonchi. Comfortable on RA   	Cardiovascular: S1/S2 auscultated. Regular rhythm. No murmurs, rubs or gallop.  	Abdomen: Soft, non-tender, nondistended, no guarding or rebound tenderness.  	Extremities: No significant deformity or joint abnormality. Peripheral pulses intact. No peripheral edema. No clubbing or cyanosis.   	Skin: Intact, no rash.  	Neurological: AOAx3. CN2-12 grossly intact.    Psychiatry: Oriented to person, place, and time. Able to demonstrate good judgement and reason, without hallucinations, abnormal affect or abnormal behaviors during the examination.      HOSPITAL MEDICATIONS:  pantoprazole  Injectable 40 milliGRAM(s) IV Push every 12 hours      LABS:                        8.7    7.89  )-----------( 216      ( 13 Nov 2018 07:10 )             25.3     Hgb Trend: 8.7<--, 8.1<--, 8.5<--  11-13    138  |  103  |  22  ----------------------------<  98  3.8   |  25  |  0.97    Ca    8.8      13 Nov 2018 07:10  Phos  2.8     11-13  Mg     1.9     11-13    TPro  6.6  /  Alb  3.7  /  TBili  0.3  /  DBili  x   /  AST  17  /  ALT  10  /  AlkPhos  56  11-12    Creatinine Trend: 0.97<--, 0.88<--, 0.96<--, 1.13<--  PT/INR - ( 12 Nov 2018 14:00 )   PT: 14.2 SEC;   INR: 1.24        PTT - ( 12 Nov 2018 14:00 )  PTT:26.3 SEC    Venous Blood Gas:  11-12 @ 12:50  7.37/49/< 24/25/27.9  VBG Lactate: 2.2      RADIOLOGY: None INTERVAL EVENTS / SUBJECTIVE:    Transfused 1U pRBC, inadequate response Hg 8.5 -> 8.7. No acute events overnight. Pt seen and examined at bedside this am. Reports feeling tired. Denies any BMs since melana yesterday morning. Plan for possible EGD today.      OBJECTIVE:  Vital Signs Last 24 Hrs  T(C): 36.6 (13 Nov 2018 05:39), Max: 37.2 (12 Nov 2018 16:55)  T(F): 97.8 (13 Nov 2018 05:39), Max: 98.9 (12 Nov 2018 16:55)  HR: 96 (13 Nov 2018 05:39) (96 - 120)  BP: 120/75 (13 Nov 2018 05:39) (120/75 - 140/89)  BP(mean): --  RR: 16 (13 Nov 2018 05:39) (16 - 20)  SpO2: 100% (13 Nov 2018 05:39) (95% - 100%)      PHYSICAL EXAM:  	General: Alert and cooperative. No acute distress.  	Head: Atraumatic/Normocephalic, no mass or lesions.  	Eyes: PERRL, clear conjunctiva, EOMI, no ptosis.   	Neck: No lymphadenopathy, no JVD.   	Respiratory: Mild bibasilar crackles. No wheezes or rhonchi. Comfortable on RA   	Cardiovascular: S1/S2 auscultated. Regular rhythm. No murmurs, rubs or gallop.  	Abdomen: Soft, non-tender, nondistended, no guarding or rebound tenderness.  	Extremities: No significant deformity or joint abnormality. Peripheral pulses intact. No peripheral edema. No clubbing or cyanosis.   	Skin: Intact, no rash.  	Neurological: AOAx3. CN2-12 grossly intact.    Psychiatry: Oriented to person, place, and time. Able to demonstrate good judgement and reason, without hallucinations, abnormal affect or abnormal behaviors during the examination.      HOSPITAL MEDICATIONS:  pantoprazole  Injectable 40 milliGRAM(s) IV Push every 12 hours      LABS:                        8.7    7.89  )-----------( 216      ( 13 Nov 2018 07:10 )             25.3     Hgb Trend: 8.7<--, 8.1<--, 8.5<--  11-13    138  |  103  |  22  ----------------------------<  98  3.8   |  25  |  0.97    Ca    8.8      13 Nov 2018 07:10  Phos  2.8     11-13  Mg     1.9     11-13    TPro  6.6  /  Alb  3.7  /  TBili  0.3  /  DBili  x   /  AST  17  /  ALT  10  /  AlkPhos  56  11-12    Creatinine Trend: 0.97<--, 0.88<--, 0.96<--, 1.13<--  PT/INR - ( 12 Nov 2018 14:00 )   PT: 14.2 SEC;   INR: 1.24        PTT - ( 12 Nov 2018 14:00 )  PTT:26.3 SEC    Venous Blood Gas:  11-12 @ 12:50  7.37/49/< 24/25/27.9  VBG Lactate: 2.2      RADIOLOGY: None

## 2018-11-13 NOTE — CONSULT NOTE ADULT - ATTENDING COMMENTS
Patient seen/examined. See full EGD report.    IMP: UGI bleed secondary to DU- EGD noted for 2 duodenal bulbar ulcers with "clean white base". Low risk of rebleed.    REC:  -Serial hemoglobin/hematocrit  -avoid NSAIDs  -Await gastric biopsy regarding Helicobacter pylori  -Pantoprazole 40 mg p.o. b.i.d.

## 2018-11-13 NOTE — PROGRESS NOTE ADULT - ASSESSMENT
84 yo male with PMH of colon cancer s/p R hemicolectomy 10 yrs ago, multiple SBO, RBBB presenting with generalized weakness, exertional SOB, diarrhea, and black stools, admitted with acute blood loss anemia likely 2/2 upper GI bleed now s/p 1U pRBC. 86 yo male with PMH of colon cancer s/p R hemicolectomy 10 yrs ago, multiple SBO, RBBB presenting with generalized weakness, exertional SOB, diarrhea, and black stools, admitted with acute blood loss anemia likely 2/2 upper GI bleed now s/p 1U pRBC. NPO for possible EGD today.

## 2018-11-13 NOTE — PROGRESS NOTE ADULT - PROBLEM SELECTOR PLAN 4
noted on prior EGD  - Outpt GI f/u  - c/w ppi as above noted on prior EGD  - Outpt GI f/u  - C/w PPI as above

## 2018-11-13 NOTE — CONSULT NOTE ADULT - SUBJECTIVE AND OBJECTIVE BOX
Chief Complaint:  Patient is a 85y old  Male who presents with a chief complaint of GI bleed (12 Nov 2018 19:22)      HPI:    Allergies:  No Known Allergies      Home Medications:    Hospital Medications:  pantoprazole  Injectable 40 milliGRAM(s) IV Push every 12 hours      PMHX/PSHX:  PVD (peripheral vascular disease)  Douglas esophagus  HTN (hypertension)  H/O inguinal hernia repair  H/O right hemicolectomy  No significant past surgical history      Family history:  No pertinent family history in first degree relatives      Social History:     Review of systems: Negative, except as otherwise noted above      PHYSICAL EXAM:   Vital Signs:  Vital Signs Last 24 Hrs  T(C): 36.6 (13 Nov 2018 05:39), Max: 37.2 (12 Nov 2018 16:55)  T(F): 97.8 (13 Nov 2018 05:39), Max: 98.9 (12 Nov 2018 16:55)  HR: 96 (13 Nov 2018 05:39) (96 - 120)  BP: 120/75 (13 Nov 2018 05:39) (120/75 - 140/89)  BP(mean): --  RR: 16 (13 Nov 2018 05:39) (16 - 20)  SpO2: 100% (13 Nov 2018 05:39) (95% - 100%)  Daily     Daily     GENERAL:  No acute distress  HEENT:  Anicteric, no thrush  CHEST:  Non-labored breathing, lungs clear b/l  HEART:  +s1, s2 heart sounds, no murmurs  ABDOMEN:    EXTREMITIES:  warm and well perfused, no edema  SKIN:    NEURO:          LABS:  CBC Full  -  ( 13 Nov 2018 07:10 )  WBC Count : 7.89 K/uL  Hemoglobin : 8.7 g/dL  Hematocrit : 25.3 %  Platelet Count - Automated : 216 K/uL  Mean Cell Volume : 94.4 fL  Auto Neutrophil # :   Auto Neutrophil % :     11-13 @ 07:10  Na 138 mmol/L  K 3.8 mmol/L  Cl 103 mmol/L  CO2 25 mmol/L  BUN 22 mg/dL  Creat 0.97 mg/dL  Glucose 98 mg/dL  Ca 8.8 mg/dL    Total protein --  Albumin --  T bili --  Alk phos --  AST --  ALT --    PT/INR - ( 12 Nov 2018 14:00 )   PT: 14.2 SEC;   INR: 1.24          PTT - ( 12 Nov 2018 14:00 )  PTT:26.3 SEC      Imaging: Chief Complaint:  Patient is a 85y old  Male who presents with a chief complaint of GI bleed (12 Nov 2018 19:22)      HPI: 85M with history of colon cancer s/p R hemicolectomy and chemotherapy (12 years ago), multiple SBO (with recent admission October 2018, managed conservatively), who was sent in by his PMD for anemia and complaining of 1 week of black stools. He reports hard black stools for 1 week, approximately 1 episode daily. He notes generalized weakness, worse over the past few days. His PMD reported joanne labs that revealed anemia. He denies abdominal pain, nausea, vomiting, hematochezia, hematemesis. His wife believes that he had a 10 pound weight loss over the past few months. He has had no dysphagia or odynophagia. His last colonoscopy was approximately 2 years ago - reportedly normal. His last EGD was at the same time and was also normal. He denies antiplatelet or anticoagulant use. He denies iron supplementation or bismuth use. He denies alcohol, tobacco, drug use. He was born in [place]. He denies family history of colon cancer, gastric cancer, esophageal cancer, or ulcer disease.    Allergies:  No Known Allergies    Home Medications:  atenolol 25 mg oral tablet: 1 tab(s) orally once a day (28 Jun 2017 19:45)  lisinopril 5 mg oral tablet: 1 tab(s) orally once a day (28 Jun 2017 19:45)  NexIUM 40 mg oral delayed release capsule: 1 cap(s) orally once a day (28 Jun 2017 19:45)      Hospital Medications:  pantoprazole  Injectable 40 milliGRAM(s) IV Push every 12 hours      PMHX/PSHX:  PVD (peripheral vascular disease)  Douglas esophagus  HTN (hypertension)  H/O inguinal hernia repair  H/O right hemicolectomy  No significant past surgical history      Family history:  No pertinent family history in first degree relatives      Social History: As above    Review of systems: Negative, except as otherwise noted above      PHYSICAL EXAM:   Vital Signs:  Vital Signs Last 24 Hrs  T(C): 36.6 (13 Nov 2018 05:39), Max: 37.2 (12 Nov 2018 16:55)  T(F): 97.8 (13 Nov 2018 05:39), Max: 98.9 (12 Nov 2018 16:55)  HR: 96 (13 Nov 2018 05:39) (96 - 120)  BP: 120/75 (13 Nov 2018 05:39) (120/75 - 140/89)  BP(mean): --  RR: 16 (13 Nov 2018 05:39) (16 - 20)  SpO2: 100% (13 Nov 2018 05:39) (95% - 100%)  Daily     Daily     GENERAL:  No acute distress  HEENT:  Anicteric, no thrush  CHEST:  Non-labored breathing, lungs clear b/l  HEART:  +s1, s2 heart sounds, no murmurs  ABDOMEN:  Soft, nontender, no rebound, no guarding  RECTAL: No blood or stool in rectal vault  EXTREMITIES:  warm and well perfused, no edema  SKIN:  No rash  NEURO:  Awake, interactive, following commands        LABS:  CBC Full  -  ( 13 Nov 2018 07:10 )  WBC Count : 7.89 K/uL  Hemoglobin : 8.7 g/dL  Hematocrit : 25.3 %  Platelet Count - Automated : 216 K/uL  Mean Cell Volume : 94.4 fL  Auto Neutrophil # :   Auto Neutrophil % :     Hemoglobin: 8.7 g/dL (11-13 @ 07:10)  Hemoglobin: 8.1 g/dL (11-12 @ 23:45)  Hemoglobin: 8.5 g/dL (11-12 @ 12:50)      11-13 @ 07:10  Na 138 mmol/L  K 3.8 mmol/L  Cl 103 mmol/L  CO2 25 mmol/L  BUN 22 mg/dL  Creat 0.97 mg/dL  Glucose 98 mg/dL  Ca 8.8 mg/dL    Total protein --  Albumin --  T bili --  Alk phos --  AST --  ALT --    PT/INR - ( 12 Nov 2018 14:00 )   PT: 14.2 SEC;   INR: 1.24          PTT - ( 12 Nov 2018 14:00 )  PTT:26.3 SEC      Imaging:    < from: CT Angio Abdomen and Pelvis w/ IV Cont (11.12.18 @ 14:57) >    EXAM:  CT ANGIO ABD PELV (W)AW IC        PROCEDURE DATE:  Nov 12 2018         INTERPRETATION:  CLINICAL INFORMATION: History of colon cancer status   post resection. Bloody stool. Evaluate for GI bleed.    COMPARISON: CT abdomen and pelvis from 10/16/2018.    PROCEDURE:   CT of the Abdomen and Pelvis was performed with and without intravenous   contrast.  Precontrast, Arterial and Delayed phases were performed.  Intravenous contrast: 90 ml Omnipaque 350. 10 ml discarded.  Oral contrast: None.  Sagittal and coronal reformats were performed.    FINDINGS:    LOWER CHEST: Coronary artery and aortic valve annular calcifications.   Unchanged fibrotic changes of the lung with traction bronchiectasis.   Small amount of fluid in the distal esophagus.    LIVER: Subcentimeter hypoattenuating lesions in the right hepatic lobe   are too small to characterize.  BILE DUCTS: Normal caliber.  GALLBLADDER: Within normal limits.  SPLEEN: Within normal limits.  PANCREAS: Within normal limits.  ADRENALS: Within normal limits.  KIDNEYS/URETERS: Bilateral renal cysts. Additional bilateral   subcentimeter hypodense lesions are too small to characterize. No   hydronephrosis.    BLADDER: Within normal limits.  REPRODUCTIVE ORGANS: Mild prostate enlargement.    BOWEL: No evidence of active gastrointestinal bleeding. No bowel   obstruction. Status post right hemicolectomy.   PERITONEUM: No ascites.  VESSELS:  Atherosclerotic changes. 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  RETROPERITONEUM: No lymphadenopathy.    ABDOMINAL WALL: Small umbilical hernia containing nonobstructed small   bowel. Nonspecific edema overlying the right buttock (series 5 image 121).  BONES: Degenerative changes of the spine. Stable grade I  anterolisthesis   of L4 on L5.    IMPRESSION:     No evidence of active gastrointestinal bleeding.     Status post right hemicolectomy without bowel obstruction.                  LAI PACHECO M.D., RADIOLOGY RESIDENT  This document has been electronically signed.  ISAURA COLLADO M.D., ATTENDING RADIOLOGIST  This document has been electronically signed. Nov 12 2018  4:19PM                  < end of copied text >

## 2018-11-13 NOTE — PROGRESS NOTE ADULT - PROBLEM SELECTOR PLAN 3
Colon ca s/p R hemicolectomy c/b SBO - Tolerating PO, no n/v  - Outpt GI f/u Colon ca s/p R hemicolectomy c/b SBO - Tolerating PO, no n/v  - CT noted subcentimeter hypodense lesions in liver and kidneys, will clarify implications w radiology  - Outpt GI f/u

## 2018-11-14 ENCOUNTER — TRANSCRIPTION ENCOUNTER (OUTPATIENT)
Age: 83
End: 2018-11-14

## 2018-11-14 VITALS
DIASTOLIC BLOOD PRESSURE: 76 MMHG | TEMPERATURE: 98 F | SYSTOLIC BLOOD PRESSURE: 111 MMHG | OXYGEN SATURATION: 100 % | RESPIRATION RATE: 17 BRPM | HEART RATE: 85 BPM

## 2018-11-14 LAB
BUN SERPL-MCNC: 15 MG/DL — SIGNIFICANT CHANGE UP (ref 7–23)
CALCIUM SERPL-MCNC: 8.7 MG/DL — SIGNIFICANT CHANGE UP (ref 8.4–10.5)
CHLORIDE SERPL-SCNC: 100 MMOL/L — SIGNIFICANT CHANGE UP (ref 98–107)
CO2 SERPL-SCNC: 25 MMOL/L — SIGNIFICANT CHANGE UP (ref 22–31)
CREAT SERPL-MCNC: 0.92 MG/DL — SIGNIFICANT CHANGE UP (ref 0.5–1.3)
GLUCOSE SERPL-MCNC: 104 MG/DL — HIGH (ref 70–99)
HCT VFR BLD CALC: 27.3 % — LOW (ref 39–50)
HGB BLD-MCNC: 9.1 G/DL — LOW (ref 13–17)
MAGNESIUM SERPL-MCNC: 1.8 MG/DL — SIGNIFICANT CHANGE UP (ref 1.6–2.6)
MCHC RBC-ENTMCNC: 31.9 PG — SIGNIFICANT CHANGE UP (ref 27–34)
MCHC RBC-ENTMCNC: 33.3 % — SIGNIFICANT CHANGE UP (ref 32–36)
MCV RBC AUTO: 95.8 FL — SIGNIFICANT CHANGE UP (ref 80–100)
NRBC # FLD: 0 — SIGNIFICANT CHANGE UP
PHOSPHATE SERPL-MCNC: 3.2 MG/DL — SIGNIFICANT CHANGE UP (ref 2.5–4.5)
PLATELET # BLD AUTO: 249 K/UL — SIGNIFICANT CHANGE UP (ref 150–400)
PMV BLD: 9.9 FL — SIGNIFICANT CHANGE UP (ref 7–13)
POTASSIUM SERPL-MCNC: 3.6 MMOL/L — SIGNIFICANT CHANGE UP (ref 3.5–5.3)
POTASSIUM SERPL-SCNC: 3.6 MMOL/L — SIGNIFICANT CHANGE UP (ref 3.5–5.3)
RBC # BLD: 2.85 M/UL — LOW (ref 4.2–5.8)
RBC # FLD: 14.7 % — HIGH (ref 10.3–14.5)
SODIUM SERPL-SCNC: 136 MMOL/L — SIGNIFICANT CHANGE UP (ref 135–145)
WBC # BLD: 10.7 K/UL — HIGH (ref 3.8–10.5)
WBC # FLD AUTO: 10.7 K/UL — HIGH (ref 3.8–10.5)

## 2018-11-14 PROCEDURE — 99239 HOSP IP/OBS DSCHRG MGMT >30: CPT

## 2018-11-14 PROCEDURE — 99232 SBSQ HOSP IP/OBS MODERATE 35: CPT | Mod: GC

## 2018-11-14 RX ORDER — ESOMEPRAZOLE MAGNESIUM 40 MG/1
1 CAPSULE, DELAYED RELEASE ORAL
Qty: 0 | Refills: 0 | COMMUNITY

## 2018-11-14 RX ORDER — ATENOLOL 25 MG/1
1 TABLET ORAL
Qty: 0 | Refills: 0 | COMMUNITY

## 2018-11-14 RX ORDER — PANTOPRAZOLE SODIUM 20 MG/1
1 TABLET, DELAYED RELEASE ORAL
Qty: 60 | Refills: 0 | OUTPATIENT
Start: 2018-11-14 | End: 2018-12-13

## 2018-11-14 RX ORDER — PANTOPRAZOLE SODIUM 20 MG/1
1 TABLET, DELAYED RELEASE ORAL
Qty: 60 | Refills: 0
Start: 2018-11-14 | End: 2018-12-13

## 2018-11-14 RX ORDER — LISINOPRIL 2.5 MG/1
1 TABLET ORAL
Qty: 0 | Refills: 0 | COMMUNITY

## 2018-11-14 RX ORDER — ACETAMINOPHEN 500 MG
650 TABLET ORAL ONCE
Qty: 0 | Refills: 0 | Status: COMPLETED | OUTPATIENT
Start: 2018-11-14 | End: 2018-11-14

## 2018-11-14 RX ADMIN — Medication 650 MILLIGRAM(S): at 08:56

## 2018-11-14 RX ADMIN — Medication 650 MILLIGRAM(S): at 09:32

## 2018-11-14 RX ADMIN — PANTOPRAZOLE SODIUM 40 MILLIGRAM(S): 20 TABLET, DELAYED RELEASE ORAL at 05:33

## 2018-11-14 RX ADMIN — PANTOPRAZOLE SODIUM 40 MILLIGRAM(S): 20 TABLET, DELAYED RELEASE ORAL at 17:37

## 2018-11-14 NOTE — DISCHARGE NOTE ADULT - OTHER SIGNIFICANT FINDINGS
4yo male with PMH of colon cancer s/p R hemicolectomy 10 yrs ago, multiple SBO, Douglas's, RBBB presenting with generalized weakness, exertional SOB, and black stools. He was discharged 2 weeks ago after being treated non-surgically for a SBO. Since he was discharged he has been having black stools. He reports his internist also noted anemia on his labs. He came in today because he was so weak he could not stand up. Also endorses diarrhea since yesterday. Regarding his h/o colon cancer, he reports undergoing R hemicolectomy 10 yrs ago as well as radiation, denies recurrence since then. Reports SBO 1 yr ago and 1 month ago, both managed medically w NGT without requiring surgery. Denies nausea, vomiting, fevers, chills, chest pain, SOB, cough, abdominal pain, recent travel, h/o H pylori infection, or use of NSAIDs. In ED, vitals were T 98.6, , /82, RR 16, SpO2 99% on RA. Labs notable for positive FOBT and Hb 8.5, decreased from 11.8 on 10/18. Pt was given 1 L NS and 1U pRBC, started pantoprazole, admitted to medicine for further work-up. Underwent EGD on 11/13 which revealed Douglas's, small hiatal hernia, and 2 non-bleeding duodenal ulcers. Sent biopsy for H pylori, results pending. Reported BM with melana on 11/13 following EGD, however Hb remained stable at 9.1 on 11/14 and pt had no further BMs w melana. PT eval rec'd home PT. BP remained wnl despite holding home atenolol and lisinopril. Pt remained hemodynamically stable, denied dizziness or lightheadedness, and was discharged to home 11/14 with plans to continue prantoprazole and f/u with outpt GI for biopsy results.

## 2018-11-14 NOTE — DISCHARGE NOTE ADULT - HOSPITAL COURSE
86yo male with PMH of colon cancer s/p R hemicolectomy 10 yrs ago, multiple SBO, Douglas's, RBBB presenting with generalized weakness, exertional SOB, and black stools. He was discharged 2 weeks ago after being treated non-surgically for a SBO. Since he was discharged he has been having black stools. He reports his internist also noted anemia on his labs. He came in today because he was so weak he could not stand up. Also endorses diarrhea since yesterday. Regarding his h/o colon cancer, he reports undergoing R hemicolectomy 10 yrs ago as well as radiation, denies recurrence since then. Reports SBO 1 yr ago and 1 month ago, both managed medically w NGT without requiring surgery. Denies nausea, vomiting, fevers, chills, chest pain, SOB, cough, abdominal pain, recent travel, h/o H pylori infection, or use of NSAIDs. In ED, vitals were T 98.6, , /82, RR 16, SpO2 99% on RA. Labs notable for positive FOBT and Hb 8.5, decreased from 11.8 on 10/18. Pt was given 1 L NS and 1U pRBC, started pantoprazole, admitted to medicine for further work-up. Underwent EGD on 11/13 which revealed Douglas's, small hiatal hernia, and 2 non-bleeding duodenal ulcers. Sent biopsy for H pylori, results pending. Reported BM with melana on 11/13 following EGD, however Hb remained stable at 9.1 on 11/14 and pt had no further BMs w melana. PT eval rec'd home PT. CT revealed hypodense kidney and liver lesions, per radiology likely cysts not concerning for malignancy and not requiring further monitoring, discussed findings with pt. BP remained wnl despite holding home atenolol and lisinopril, pt was therefore advised to stop these meds upon discharge and f/u with his PCP for further monitoring. Pt remained hemodynamically stable, denied dizziness or lightheadedness, and was discharged to home 11/14 with plans to continue prantoprazole and f/u with outpt GI for biopsy results. 86yo male with PMH of colon cancer s/p R hemicolectomy 10 yrs ago, multiple SBO, Douglas's, RBBB presenting with generalized weakness, exertional SOB, and black stools. In ED, vitals were T 98.6, , /82, RR 16, SpO2 99% on RA. Labs notable for positive FOBT and Hb 8.5, decreased from 11.8 on 10/18. Pt was given 1 L NS and 1U pRBC, started pantoprazole, admitted to medicine for further work-up. GI was consulted. Underwent EGD on 11/13 which revealed Douglas's, small hiatal hernia, and 2 non-bleeding duodenal ulcers. Sent biopsy for H pylori, results pending. Reported BM with melana on 11/13 following EGD, however Hb remained stable at 9.1 on 11/14 and pt had no further BMs w melana. PT eval rec'd home PT. CT revealed hypodense kidney and liver lesions, per radiology likely cysts not concerning for malignancy and not requiring further monitoring, discussed findings with pt. BP remained wnl despite holding home atenolol and lisinopril, pt was therefore advised to stop these meds upon discharge and f/u with his PCP for further monitoring. Pt remained hemodynamically stable, denied dizziness or lightheadedness, and was discharged to home 11/14 with plans to continue prantoprazole and f/u with outpt GI for biopsy results. 86 yo male with PMH of colon cancer s/p R hemicolectomy 10 yrs ago, multiple SBO, Douglas's, RBBB presenting with generalized weakness, exertional SOB, and black stools. In ED, vitals were T 98.6, , /82, RR 16, SpO2 99% on RA. Labs notable for positive FOBT and Hb 8.5, decreased from 11.8 on 10/18. Pt was given 1 L NS and 1U pRBC, started pantoprazole, admitted to medicine for acute blood loss anemia 2/2 UGIB (+melena, BUN/Cr ratio >30). GI was consulted. Underwent EGD on 11/13 which revealed Douglas's, small hiatal hernia, and 2 non-bleeding duodenal ulcers (which were felt to by GI to be the likely source of bleed). Sent biopsy for H pylori, results pending. Reported BM with melana on 11/13 following EGD, however Hb remained stable at 9.1 on 11/14 and pt had no further BMs w melena. PT eval rec'd home PT w/ rolling walker. CT revealed hypodense kidney and liver lesions, per radiology likely cysts not concerning for malignancy and not requiring further monitoring, discussed findings with pt. BP remained wnl despite holding home atenolol and lisinopril, pt was therefore advised to stop these meds upon discharge and f/u with his PCP for further monitoring. Pt remained hemodynamically stable, denied dizziness or lightheadedness, and was discharged to home 11/14 with plans to continue pantoprazole and f/u with outpt GI for biopsy results.

## 2018-11-14 NOTE — PROGRESS NOTE ADULT - SUBJECTIVE AND OBJECTIVE BOX
INTERVAL EVENTS / SUBJECTIVE:    EGD revealed Douglas's, 2 non-bleeding duodenal ulcers, small hiatal hernia. Resumed diet, Pantoprazole 40 bid. Clarified CT findings w radiology, stated that hypodense lesions stable since June 2017, likely cysts, low concern for malignancy, rec'd against f/u imaging. No acute events overnight. Pt seen and examined at bedside this am, reports tolerating PO w/o n/v, denies melana.      OBJECTIVE:  Vital Signs Last 24 Hrs  T(C): 36.6 (14 Nov 2018 05:10), Max: 36.8 (14 Nov 2018 01:13)  T(F): 97.8 (14 Nov 2018 05:10), Max: 98.2 (14 Nov 2018 01:13)  HR: 94 (14 Nov 2018 05:10) (94 - 114)  BP: 111/74 (14 Nov 2018 05:10) (111/74 - 127/87)  BP(mean): --  RR: 17 (14 Nov 2018 05:10) (16 - 18)  SpO2: 100% (14 Nov 2018 05:10) (99% - 100%)      PHYSICAL EXAM:  	General: Alert and cooperative. No acute distress.  	Head: Atraumatic/Normocephalic, no mass or lesions.  	Eyes: PERRL, clear conjunctiva, EOMI, no ptosis.   	Neck: No lymphadenopathy, no JVD.   	Respiratory: Mild bibasilar crackles. No wheezes or rhonchi. Comfortable on RA   	Cardiovascular: S1/S2 auscultated. Regular rhythm. No murmurs, rubs or gallop.  	Abdomen: Soft, non-tender, nondistended, no guarding or rebound tenderness.  	Extremities: No significant deformity or joint abnormality. Peripheral pulses intact. No peripheral edema. No clubbing or cyanosis.   	Skin: Intact, no rash.  	Neurological: AOAx3. CN2-12 grossly intact.    Psychiatry: Oriented to person, place, and time. Able to demonstrate good judgement and reason, without hallucinations, abnormal affect or abnormal behaviors during the examination.      HOSPITAL MEDICATIONS:  pantoprazole    Tablet 40 milliGRAM(s) Oral two times a day      LABS:                        8.7    7.89  )-----------( 216      ( 13 Nov 2018 07:10 )             25.3     Hgb Trend: 8.7<--, 8.1<--, 8.5<--  11-13    138  |  103  |  22  ----------------------------<  98  3.8   |  25  |  0.97    Ca    8.8      13 Nov 2018 07:10  Phos  2.8     11-13  Mg     1.9     11-13    TPro  6.6  /  Alb  3.7  /  TBili  0.3  /  DBili  x   /  AST  17  /  ALT  10  /  AlkPhos  56  11-12    Creatinine Trend: 0.97<--, 0.88<--, 0.96<--, 1.13<--  PT/INR - ( 12 Nov 2018 14:00 )   PT: 14.2 SEC;   INR: 1.24        PTT - ( 12 Nov 2018 14:00 )  PTT:26.3 SEC    Venous Blood Gas:  11-12 @ 12:50  7.37/49/< 24/25/27.9  VBG Lactate: 2.2 INTERVAL EVENTS / SUBJECTIVE:    EGD revealed Douglas's, 2 non-bleeding duodenal ulcers, small hiatal hernia. Resumed diet, Pantoprazole 40 bid. Clarified CT findings w radiology, stated that hypodense lesions stable since June 2017, likely cysts, low concern for malignancy, rec'd against f/u imaging. No acute events overnight. Pt seen and examined at bedside this am, reports tolerating PO w/o n/v. Reports BM w melana last night, no BMs since.      OBJECTIVE:  Vital Signs Last 24 Hrs  T(C): 36.6 (14 Nov 2018 05:10), Max: 36.8 (14 Nov 2018 01:13)  T(F): 97.8 (14 Nov 2018 05:10), Max: 98.2 (14 Nov 2018 01:13)  HR: 94 (14 Nov 2018 05:10) (94 - 114)  BP: 111/74 (14 Nov 2018 05:10) (111/74 - 127/87)  BP(mean): --  RR: 17 (14 Nov 2018 05:10) (16 - 18)  SpO2: 100% (14 Nov 2018 05:10) (99% - 100%)      PHYSICAL EXAM:  	General: Alert and cooperative. No acute distress.  	Head: Atraumatic/Normocephalic, no mass or lesions.  	Eyes: PERRL, clear conjunctiva, EOMI, no ptosis.   	Neck: No lymphadenopathy, no JVD.   	Respiratory: Mild bibasilar crackles. No wheezes or rhonchi. Comfortable on RA   	Cardiovascular: S1/S2 auscultated. Regular rhythm. No murmurs, rubs or gallop.  	Abdomen: Soft, non-tender, nondistended, no guarding or rebound tenderness.  	Extremities: No significant deformity or joint abnormality. Peripheral pulses intact. No peripheral edema. No clubbing or cyanosis.   	Skin: Intact, no rash.  	Neurological: AOAx3. CN2-12 grossly intact.    Psychiatry: Oriented to person, place, and time. Able to demonstrate good judgement and reason, without hallucinations, abnormal affect or abnormal behaviors during the examination.      HOSPITAL MEDICATIONS:  pantoprazole    Tablet 40 milliGRAM(s) Oral two times a day      LABS:                        9.1    10.70 )-----------( 249      ( 14 Nov 2018 06:30 )             27.3                         8.7    7.89  )-----------( 216      ( 13 Nov 2018 07:10 )             25.3     Hgb Trend: 8.7<--, 8.1<--, 8.5<--  11-13    138  |  103  |  22  ----------------------------<  98  3.8   |  25  |  0.97    Ca    8.8      13 Nov 2018 07:10  Phos  2.8     11-13  Mg     1.9     11-13    TPro  6.6  /  Alb  3.7  /  TBili  0.3  /  DBili  x   /  AST  17  /  ALT  10  /  AlkPhos  56  11-12    Creatinine Trend: 0.97<--, 0.88<--, 0.96<--, 1.13<--  PT/INR - ( 12 Nov 2018 14:00 )   PT: 14.2 SEC;   INR: 1.24        PTT - ( 12 Nov 2018 14:00 )  PTT:26.3 SEC    Venous Blood Gas:  11-12 @ 12:50  7.37/49/< 24/25/27.9  VBG Lactate: 2.2 INTERVAL EVENTS / SUBJECTIVE:    EGD revealed Douglas's, 2 non-bleeding duodenal ulcers, small hiatal hernia. Resumed diet, Pantoprazole 40 bid. Clarified CT findings w radiology, stated that hypodense lesions stable since June 2017, likely cysts, low concern for malignancy, rec'd against f/u imaging. No acute events overnight. Pt seen and examined at bedside this am, reports tolerating PO w/o n/v. Reports BM w melana last night, no BMs since.    OBJECTIVE:  Vital Signs Last 24 Hrs  T(C): 36.6 (14 Nov 2018 05:10), Max: 36.8 (14 Nov 2018 01:13)  T(F): 97.8 (14 Nov 2018 05:10), Max: 98.2 (14 Nov 2018 01:13)  HR: 94 (14 Nov 2018 05:10) (94 - 114)  BP: 111/74 (14 Nov 2018 05:10) (111/74 - 127/87)  BP(mean): --  RR: 17 (14 Nov 2018 05:10) (16 - 18)  SpO2: 100% (14 Nov 2018 05:10) (99% - 100%)      PHYSICAL EXAM:  	General: Alert and cooperative. No acute distress.  	Head: Atraumatic/Normocephalic, no mass or lesions.  	Eyes: PERRL, clear conjunctiva, EOMI, no ptosis.   	Neck: No lymphadenopathy, no JVD.   	Respiratory: Mild bibasilar crackles. No wheezes or rhonchi. Comfortable on RA   	Cardiovascular: S1/S2 auscultated. Regular rhythm. No murmurs, rubs or gallop.  	Abdomen: Soft, non-tender, nondistended, no guarding or rebound tenderness.  	Extremities: No significant deformity or joint abnormality. Peripheral pulses intact. No peripheral edema. No clubbing or cyanosis.   	Skin: Intact, no rash.  	Neurological: AOAx3. CN2-12 grossly intact.    Psychiatry: Oriented to person, place, and time. Able to demonstrate good judgement and reason, without hallucinations, abnormal affect or abnormal behaviors during the examination.      HOSPITAL MEDICATIONS:  pantoprazole    Tablet 40 milliGRAM(s) Oral two times a day      LABS:                        9.1    10.70 )-----------( 249      ( 14 Nov 2018 06:30 )             27.3                         8.7    7.89  )-----------( 216      ( 13 Nov 2018 07:10 )             25.3     Hgb Trend: 8.7<--, 8.1<--, 8.5<--  11-13    138  |  103  |  22  ----------------------------<  98  3.8   |  25  |  0.97    Ca    8.8      13 Nov 2018 07:10  Phos  2.8     11-13  Mg     1.9     11-13    TPro  6.6  /  Alb  3.7  /  TBili  0.3  /  DBili  x   /  AST  17  /  ALT  10  /  AlkPhos  56  11-12    Creatinine Trend: 0.97<--, 0.88<--, 0.96<--, 1.13<--  PT/INR - ( 12 Nov 2018 14:00 )   PT: 14.2 SEC;   INR: 1.24        PTT - ( 12 Nov 2018 14:00 )  PTT:26.3 SEC    Venous Blood Gas:  11-12 @ 12:50  7.37/49/< 24/25/27.9  VBG Lactate: 2.2

## 2018-11-14 NOTE — DISCHARGE NOTE ADULT - CARE PROVIDER_API CALL
Papito Rosario), Gastroenterology; Internal Medicine  [address] [address]  Phone: [phone]  Fax: [phone]

## 2018-11-14 NOTE — PHYSICAL THERAPY INITIAL EVALUATION ADULT - PERTINENT HX OF CURRENT PROBLEM, REHAB EVAL
86 yo male with PMH of colon cancer s/p Right hemicolectomy 10 yrs ago, multiple SBO presenting with generalized weakness, exertional SOB, diarrhea, and black stools, admitted with acute blood loss anemia likely secondary to upper GI bleed

## 2018-11-14 NOTE — PROGRESS NOTE ADULT - ASSESSMENT
86 yo male with PMH of colon cancer s/p R hemicolectomy 10 yrs ago, multiple SBO, RBBB presenting with generalized weakness, exertional SOB, diarrhea, and black stools, admitted with acute blood loss anemia likely 2/2 upper GI bleed now s/p 1U pRBC. EGD revealed Douglas's, small hiatal hernia, and 2 non-bleeding duodenal ulcers. 84 yo male with PMH of colon cancer s/p R hemicolectomy 10 yrs ago, multiple SBO, RBBB presenting with generalized weakness, exertional SOB, diarrhea, and black stools, admitted with acute blood loss anemia likely 2/2 upper GI bleed now s/p 1U pRBC. EGD revealed Douglas's, small hiatal hernia, and 2 non-bleeding duodenal ulcers. Reports BM w melana last night, Hb stable this am.

## 2018-11-14 NOTE — PROGRESS NOTE ADULT - PROBLEM SELECTOR PLAN 2
BP well-controlled  - Hold home atenolol 25 and lisinopril 5 at this time  - Monitor BP BP well-controlled  - Continue to hold home atenolol 25 and lisinopril 5  - Monitor BP

## 2018-11-14 NOTE — DISCHARGE NOTE ADULT - PATIENT PORTAL LINK FT
You can access the [email or web address] Patient Portal, offered by [facility], by registering with the following website: [email or web address]

## 2018-11-14 NOTE — PROGRESS NOTE ADULT - PROBLEM SELECTOR PLAN 3
Colon ca s/p R hemicolectomy 10 yrs ago c/b SBO - Tolerating PO, no n/v. CT noted subcentimeter hypodense lesions in liver and kidneys, clarified w radiology 11/13 who stated that hypodense lesions have been stable since June 2017, likely cysts, low concern for malignancy, rec'd against f/u imaging.   - Outpt GI f/u Colon ca s/p R hemicolectomy 10 yrs ago c/b SBO - Tolerating PO, no n/v. CT noted subcentimeter hypodense lesions in liver and kidneys, clarified w radiology 11/13 who stated that hypodense lesions have been stable since June 2017, likely cysts, low concern for malignancy, rec'd against f/u imaging. Discussed CT findings w pt this am.  - Outpt GI f/u

## 2018-11-14 NOTE — DISCHARGE NOTE ADULT - ADDITIONAL INSTRUCTIONS
Please follow up with your Primary Care Doctor in the next week for continued evaluation and to ensure resolution of your symptoms. Please follow up with Gastroenterology in two weeks for continued evaluation.

## 2018-11-14 NOTE — PROGRESS NOTE ADULT - ASSESSMENT
Impression:  1. Dark stool, anemia with drop in hemoglobin - EGD 11/13 with clean based duodenal ulcers  2. History of colon cancer s/p R hemicolectomy and chemotherapy 12 years ago    Plan:  - PPI PO BID  - Follow up pathology from gastric biopsy  - Avoid NSAIDs  - Monitor CBC, transfuse to keep Hb > 7  - Diet as tolerated  - Outpatient GI follow up, page with any questions

## 2018-11-14 NOTE — PROGRESS NOTE ADULT - SUBJECTIVE AND OBJECTIVE BOX
Chief Complaint:  Patient is a 85y old  Male who presents with a chief complaint of GI bleed (13 Nov 2018 10:28)      Interval Events: Patient had EGD yesterday notable for two clean based duodenal ulcers - see report in [place].    Allergies:  No Known Allergies      Hospital Medications:  pantoprazole    Tablet 40 milliGRAM(s) Oral two times a day      PMHX/PSHX:  PVD (peripheral vascular disease)  Douglas esophagus  HTN (hypertension)  H/O inguinal hernia repair  H/O right hemicolectomy  No significant past surgical history      Family history:  No pertinent family history in first degree relatives      ROS: Negative, except as otherwise noted above    PHYSICAL EXAM:   Vital Signs:  Vital Signs Last 24 Hrs  T(C): 36.6 (14 Nov 2018 05:10), Max: 36.8 (14 Nov 2018 01:13)  T(F): 97.8 (14 Nov 2018 05:10), Max: 98.2 (14 Nov 2018 01:13)  HR: 94 (14 Nov 2018 05:10) (94 - 114)  BP: 111/74 (14 Nov 2018 05:10) (111/74 - 127/87)  BP(mean): --  RR: 17 (14 Nov 2018 05:10) (16 - 18)  SpO2: 100% (14 Nov 2018 05:10) (99% - 100%)  Daily     Daily     GENERAL:  No acute distress  HEENT:  Anicteric, no thrush  ABDOMEN:  Soft, nontender, no rebound, no guarding  SKIN:  No rash  NEURO:  Awake, interactive, following commands    LABS:  CBC Full  -  ( 13 Nov 2018 07:10 )  WBC Count : 7.89 K/uL  Hemoglobin : 8.7 g/dL  Hematocrit : 25.3 %  Platelet Count - Automated : 216 K/uL  Mean Cell Volume : 94.4 fL  Auto Neutrophil # :   Auto Neutrophil % :     Hemoglobin: 8.7 g/dL (11-13 @ 07:10)  Hemoglobin: 8.1 g/dL (11-12 @ 23:45)  Hemoglobin: 8.5 g/dL (11-12 @ 12:50)      11-13 @ 07:10  Na 138 mmol/L  K 3.8 mmol/L  Cl 103 mmol/L  CO2 25 mmol/L  BUN 22 mg/dL  Creat 0.97 mg/dL  Glucose 98 mg/dL  Ca 8.8 mg/dL    Total protein --  Albumin --  T bili --  Alk phos --  AST --  ALT --    PT/INR - ( 12 Nov 2018 14:00 )   PT: 14.2 SEC;   INR: 1.24          PTT - ( 12 Nov 2018 14:00 )  PTT:26.3 SEC Chief Complaint:  Patient is a 85y old  Male who presents with a chief complaint of GI bleed (13 Nov 2018 10:28)      Interval Events: Patient had EGD yesterday notable for two clean based duodenal ulcers - see report in [place]. He feels well this morning and denies melena, hematochezia, hematemesis.     Allergies:  No Known Allergies      Hospital Medications:  pantoprazole    Tablet 40 milliGRAM(s) Oral two times a day      PMHX/PSHX:  PVD (peripheral vascular disease)  Douglas esophagus  HTN (hypertension)  H/O inguinal hernia repair  H/O right hemicolectomy  No significant past surgical history      Family history:  No pertinent family history in first degree relatives      ROS: Negative, except as otherwise noted above    PHYSICAL EXAM:   Vital Signs:  Vital Signs Last 24 Hrs  T(C): 36.6 (14 Nov 2018 05:10), Max: 36.8 (14 Nov 2018 01:13)  T(F): 97.8 (14 Nov 2018 05:10), Max: 98.2 (14 Nov 2018 01:13)  HR: 94 (14 Nov 2018 05:10) (94 - 114)  BP: 111/74 (14 Nov 2018 05:10) (111/74 - 127/87)  BP(mean): --  RR: 17 (14 Nov 2018 05:10) (16 - 18)  SpO2: 100% (14 Nov 2018 05:10) (99% - 100%)  Daily     Daily     GENERAL:  No acute distress  HEENT:  Anicteric, no thrush  ABDOMEN:  Soft, nontender, no rebound, no guarding  SKIN:  No rash  NEURO:  Awake, interactive, following commands    LABS:  CBC Full  -  ( 13 Nov 2018 07:10 )  WBC Count : 7.89 K/uL  Hemoglobin : 8.7 g/dL  Hematocrit : 25.3 %  Platelet Count - Automated : 216 K/uL  Mean Cell Volume : 94.4 fL  Auto Neutrophil # :   Auto Neutrophil % :     Hemoglobin: 8.7 g/dL (11-13 @ 07:10)  Hemoglobin: 8.1 g/dL (11-12 @ 23:45)  Hemoglobin: 8.5 g/dL (11-12 @ 12:50)      11-13 @ 07:10  Na 138 mmol/L  K 3.8 mmol/L  Cl 103 mmol/L  CO2 25 mmol/L  BUN 22 mg/dL  Creat 0.97 mg/dL  Glucose 98 mg/dL  Ca 8.8 mg/dL    Total protein --  Albumin --  T bili --  Alk phos --  AST --  ALT --    PT/INR - ( 12 Nov 2018 14:00 )   PT: 14.2 SEC;   INR: 1.24          PTT - ( 12 Nov 2018 14:00 )  PTT:26.3 SEC

## 2018-11-14 NOTE — DISCHARGE NOTE ADULT - PLAN OF CARE
Treatment You were admitted because you were found to have a gastrointestinal bleed causing black stools. You were treated with pantoprazole, intravenous fluids, and blood transfusion. You underwent endoscopy on 11/13/18, which revealed 2 non-bleeding ulcers in the duodenum, which is the first portion of the small intestines. During the endoscopy, biopsies of your stomach were also taken to test for Helicobacter pylori infection. Please continue to take pantoprazole as prescribed instead of nexium, avoid non-steroidal anti-inflammatory medications (NSAIDS such as ibuprofen or motrin), and follow up with your gastroenterologist Dr. Rosario for biopsy results and further monitoring. If your gastrointestinal bleed recurs or you develop dizziness or lightheadedness, please seek immediate medical attention. Your blood pressure was monitored off of your home anti-hypertensive medications atenolol and lisinopril, and it remained well-controlled off of these medications. ***Stop or continue?** Your prior and recent endoscopy revealed a condition called Douglas's esophagus. Please follow up with gastroenterology for further monitoring. Your CT scan revealed tiny lesions in your liver and kidneys. Per discussion with radiology, these lesions appear to be cysts, and have been stable since at least June 2017. You do not need further treatment or monitoring for these lesions. If there is concern for recurrence in the future, we recommend comparison with prior imaging, as these lesions appear non-cancerous and stable since at least 2017. You were admitted because you were found to have a gastrointestinal bleed causing black stools. You were treated with pantoprazole, intravenous fluids, and blood transfusion. You underwent endoscopy on 11/13/18, which revealed 2 non-bleeding ulcers in the duodenum, which is the first portion of the small intestines. During the endoscopy, biopsies of your stomach were also taken to test for Helicobacter pylori infection. Please continue to take pantoprazole as prescribed instead of nexium, avoid non-steroidal anti-inflammatory medications (NSAIDS such as ibuprofen or motrin), and follow up with your gastroenterologist Dr. Rosario within the next week for biopsy results and further monitoring. If your gastrointestinal bleed recurs or you develop dizziness or lightheadedness, please seek immediate medical attention. Your blood pressure was monitored off of your home anti-hypertensive medications atenolol and lisinopril, and it remained well-controlled off of these medications. Please stop these medications, and follow up with your primary care provider within the next week for further monitoring of your blood pressure and further adjustment of your anti-hypertensive medications.

## 2018-11-14 NOTE — DISCHARGE NOTE ADULT - CARE PLAN
Principal Discharge DX:	GI bleed  Goal:	Treatment  Secondary Diagnosis:	HTN (hypertension)  Secondary Diagnosis:	Douglas esophagus Principal Discharge DX:	GI bleed  Goal:	Treatment  Assessment and plan of treatment:	You were admitted because you were found to have a gastrointestinal bleed causing black stools. You were treated with pantoprazole, intravenous fluids, and blood transfusion. You underwent endoscopy on 11/13/18, which revealed 2 non-bleeding ulcers in the duodenum, which is the first portion of the small intestines. During the endoscopy, biopsies of your stomach were also taken to test for Helicobacter pylori infection. Please continue to take pantoprazole as prescribed instead of nexium, avoid non-steroidal anti-inflammatory medications (NSAIDS such as ibuprofen or motrin), and follow up with your gastroenterologist Dr. Rosario for biopsy results and further monitoring. If your gastrointestinal bleed recurs or you develop dizziness or lightheadedness, please seek immediate medical attention.  Secondary Diagnosis:	HTN (hypertension)  Assessment and plan of treatment:	Your blood pressure was monitored off of your home anti-hypertensive medications atenolol and lisinopril, and it remained well-controlled off of these medications. ***Stop or continue?**  Secondary Diagnosis:	Douglas esophagus  Assessment and plan of treatment:	Your prior and recent endoscopy revealed a condition called Douglas's esophagus. Please follow up with gastroenterology for further monitoring.  Secondary Diagnosis:	Colon cancer  Assessment and plan of treatment:	Your CT scan revealed tiny lesions in your liver and kidneys. Per discussion with radiology, these lesions appear to be cysts, and have been stable since at least June 2017. You do not need further treatment or monitoring for these lesions. If there is concern for recurrence in the future, we recommend comparison with prior imaging, as these lesions appear non-cancerous and stable since at least 2017. Principal Discharge DX:	GI bleed  Goal:	Treatment  Assessment and plan of treatment:	You were admitted because you were found to have a gastrointestinal bleed causing black stools. You were treated with pantoprazole, intravenous fluids, and blood transfusion. You underwent endoscopy on 11/13/18, which revealed 2 non-bleeding ulcers in the duodenum, which is the first portion of the small intestines. During the endoscopy, biopsies of your stomach were also taken to test for Helicobacter pylori infection. Please continue to take pantoprazole as prescribed instead of nexium, avoid non-steroidal anti-inflammatory medications (NSAIDS such as ibuprofen or motrin), and follow up with your gastroenterologist Dr. Rosario within the next week for biopsy results and further monitoring. If your gastrointestinal bleed recurs or you develop dizziness or lightheadedness, please seek immediate medical attention.  Secondary Diagnosis:	HTN (hypertension)  Assessment and plan of treatment:	Your blood pressure was monitored off of your home anti-hypertensive medications atenolol and lisinopril, and it remained well-controlled off of these medications. Please stop these medications, and follow up with your primary care provider within the next week for further monitoring of your blood pressure and further adjustment of your anti-hypertensive medications.  Secondary Diagnosis:	Douglas esophagus  Assessment and plan of treatment:	Your prior and recent endoscopy revealed a condition called Douglas's esophagus. Please follow up with gastroenterology for further monitoring.  Secondary Diagnosis:	Colon cancer  Assessment and plan of treatment:	Your CT scan revealed tiny lesions in your liver and kidneys. Per discussion with radiology, these lesions appear to be cysts, and have been stable since at least June 2017. You do not need further treatment or monitoring for these lesions. If there is concern for recurrence in the future, we recommend comparison with prior imaging, as these lesions appear non-cancerous and stable since at least 2017.

## 2018-11-14 NOTE — PROGRESS NOTE ADULT - PROBLEM SELECTOR PLAN 1
GI bleed - Likely UGIB given melana and elevated BUN/Cr ratio (>30). CT A/P w/o clear source of bleed. Now s/p 1L IVF and 1U pRBC in ED. Hemodynamically stable. Not orthostatic. Hold off on IVF as pt is stable w crackles on lung exam. Hg increased from 8.5 to 8.7 s/p 1U pRBC, am CBC pending.  - Monitor CBC q8h, transfuse for Hb < 7  - Pantoprazole 40 q12h (hold home nexium 40 qD)  - Monitor BMs  - GI recs appreciated GI bleed - Likely UGIB given melana and elevated BUN/Cr ratio (>30). CT A/P w/o clear source of bleed. Now s/p 1L IVF and 1U pRBC in ED. Hemodynamically stable. Not orthostatic. Hold off on IVF as pt is stable w crackles on lung exam. Hg increased from 8.5 to 8.7 s/p 1U pRBC, stable at 9.1 this am. Reports BM w melana last night, no BMs since.  - Monitor CBC qD, transfuse for Hb < 7  - Pantoprazole 40 q12h (hold home nexium 40 qD)  - Monitor BMs  - Avoid NSAIDs  - Outpt GI f/u, GI recs appreciated GI bleed - Likely UGIB given melana and elevated BUN/Cr ratio (>30). CT A/P w/o clear source of bleed. EGD showing duodenal ulcers. Now s/p 1L IVF and 1U pRBC in ED. Hemodynamically stable. Not orthostatic. Hold off on IVF as pt is stable w crackles on lung exam. Hg increased from 8.5 to 8.7 s/p 1U pRBC, stable at 9.1 this am. Reports BM w melana last night, no BMs since.  - Monitor CBC qD, transfuse for Hb < 7  - Pantoprazole 40 q12h (hold home nexium 40 qD)  - Monitor BMs  - Avoid NSAIDs  - Outpt GI f/u, GI recs appreciated

## 2018-11-14 NOTE — PROGRESS NOTE ADULT - PROBLEM SELECTOR PLAN 4
Noted on prior EGD  - Outpt GI f/u  - C/w PPI as above Noted on prior EGD  - Outpt GI f/u re biopsy results  - C/w PPI as above

## 2018-11-14 NOTE — PROGRESS NOTE ADULT - ATTENDING COMMENTS
Feels well- no abd. pain or N/V. Pt. stable s/p UGI bleed from DU- continue to monitor serial H/H and continue PPI. Await gastric bx regarding HP.
Patient seen and examined, d/w HS (Dr. Brumfield and Dr. Watson), agree with above with following additions:    Acute blood loss anemia likely 2/2 UGIB, denies further melena, hgb appears stable s/p PRBC transfusion, went for EGD today, endoscopy showing Douglas's esophagus, hiatal hernia and two non-bleeding duodenal ulcers. C/w PO protonix bid. Appreciate GI input, continue to monitor hgb.
Patient seen and examined, d/w HS (Dr. Brumfield and Dr. Watson) agree with above with following additions:     Hgb currently stable, endoscopy yesterday showing 2 duodenal ulcers (likely source of bleed per GI, low risk of rebleed), c/w protonix bid. PT eval appreciated, recommend home PT and rolling walker. Discussed with patient and family at bedside, anticipatory guidance provided, counseled on importance of avoiding NSAIDs, taking meds (including ppi) as prescribed, and understands plan to f/u GI re: results of biopsy. In agreement with discharge plan.    Discharge time 35 minutes

## 2018-11-14 NOTE — PROGRESS NOTE ADULT - PROBLEM SELECTOR PLAN 5
- DVT ppx: SCDs in setting of GIB  - Continue home vit D3 1000 U and B - DVT ppx: SCDs in setting of GIB  - Continue home vit D3 1000 U and B  - Dispo: Pending PT eval

## 2018-11-14 NOTE — DISCHARGE NOTE ADULT - MEDICATION SUMMARY - MEDICATIONS TO STOP TAKING
I will STOP taking the medications listed below when I get home from the hospital:    NexIUM 40 mg oral delayed release capsule  -- 1 cap(s) by mouth once a day    atenolol 25 mg oral tablet  -- 1 tab(s) by mouth once a day    lisinopril 5 mg oral tablet  -- 1 tab(s) by mouth once a day

## 2018-11-14 NOTE — DISCHARGE NOTE ADULT - MEDICATION SUMMARY - MEDICATIONS TO TAKE
I will START or STAY ON the medications listed below when I get home from the hospital:    Out Patient Physical Therapy   -- Indication: For Physical therapy    pantoprazole 40 mg oral delayed release tablet  -- 1 tab(s) by mouth 2 times a day  -- Indication: For Gastrointestinal hemorrhage    Vitamin D3 1000 intl units oral capsule  -- 1 cap(s) by mouth once a day  -- Indication: For Supplement I will START or STAY ON the medications listed below when I get home from the hospital:    Out Patient Physical Therapy   -- Indication: For Physical therapy    pantoprazole 40 mg oral delayed release tablet  -- 1 tab(s) by mouth 2 times a day  -- Indication: For duodenal ulcer    Vitamin D3 1000 intl units oral capsule  -- 1 cap(s) by mouth once a day  -- Indication: For Supplement

## 2018-11-15 LAB — SURGICAL PATHOLOGY STUDY: SIGNIFICANT CHANGE UP

## 2018-12-11 NOTE — PROGRESS NOTE ADULT - SUBJECTIVE AND OBJECTIVE BOX
INTERVAL HPI/OVERNIGHT EVENTS: Pt seen and examined with Dr Graves. Passed small liquid BM, no flatus. Afebrile. No nausea or vomiting      MEDICATIONS  (STANDING):  enoxaparin Injectable 40 milliGRAM(s) SubCutaneous daily  ciprofloxacin   IVPB 400 milliGRAM(s) IV Intermittent every 12 hours  metroNIDAZOLE  IVPB 500 milliGRAM(s) IV Intermittent every 8 hours  dextrose 5% + sodium chloride 0.9% 1000 milliLiter(s) (75 mL/Hr) IV Continuous <Continuous>  pantoprazole    Tablet 40 milliGRAM(s) Oral before breakfast  enalapril 20 milliGRAM(s) Oral daily    MEDICATIONS  (PRN):  naloxone Injectable 0.1 milliGRAM(s) IV Push every 3 minutes PRN For ANY of the following changes in patient status:  A. RR LESS THAN 10 breaths per minute, B. Oxygen saturation LESS THAN 90%, C. Sedation score of 6  ondansetron Injectable 4 milliGRAM(s) IV Push every 6 hours PRN Nausea  benzocaine 15 mG/menthol 3.6 mG Lozenge 1 Lozenge Oral daily PRN Sore Throat  morphine  - Injectable 2 milliGRAM(s) IV Push every 3 hours PRN Moderate and Severe Pain      Vital Signs Last 24 Hrs  T(C): 37.3 (30 Jun 2017 09:55), Max: 37.3 (30 Jun 2017 09:55)  T(F): 99.1 (30 Jun 2017 09:55), Max: 99.1 (30 Jun 2017 09:55)  HR: 84 (30 Jun 2017 11:24) (80 - 88)  BP: 136/80 (30 Jun 2017 11:24) (136/66 - 163/71)  BP(mean): --  RR: 18 (30 Jun 2017 09:55) (16 - 18)  SpO2: 99% (30 Jun 2017 09:55) (96% - 996%)    PHYSICAL EXAM:      Constitutional: AOx3, NAD    Gastrointestinal: Abd soft, distended, mildly tender.     NGT: In place & functioning with dark GI contents      I&O's Detail    29 Jun 2017 07:01  -  30 Jun 2017 07:00  --------------------------------------------------------  IN:    dextrose 5% + sodium chloride 0.9%: 900 mL    IV PiggyBack: 950 mL    Oral Fluid: 120 mL  Total IN: 1970 mL    OUT:    Bulb: 17.5 mL    Bulb: 47.5 mL    Ileostomy: 130 mL    Indwelling Catheter - Urethral: 500 mL    Nasoenteral Tube: 100 mL    Voided: 900 mL  Total OUT: 1695 mL    Total NET: 275 mL      30 Jun 2017 07:01  -  30 Jun 2017 11:54  --------------------------------------------------------  IN:    dextrose 5% + sodium chloride 0.9%: 150 mL    dextrose 5% + sodium chloride 0.9%: 150 mL    Oral Fluid: 120 mL  Total IN: 420 mL    OUT:    Bulb: 5 mL    Bulb: 20 mL    Ileostomy: 60 mL    Voided: 400 mL  Total OUT: 485 mL    Total NET: -65 mL          LABS:                        9.5    9.09  )-----------( 281      ( 30 Jun 2017 09:08 )             28.7     06-30    143  |  104  |  3<L>  ----------------------------<  102<H>  3.0<L>   |  27  |  0.35<L>    Ca    7.9<L>      30 Jun 2017 06:00  Phos  2.1     06-30  Mg     1.8     06-30            RADIOLOGY & ADDITIONAL STUDIES:`genie Detail Level: Zone

## 2019-01-05 ENCOUNTER — EMERGENCY (EMERGENCY)
Facility: HOSPITAL | Age: 84
LOS: 1 days | Discharge: HOME | End: 2019-01-05
Attending: EMERGENCY MEDICINE | Admitting: EMERGENCY MEDICINE
Payer: MEDICARE

## 2019-01-05 VITALS
DIASTOLIC BLOOD PRESSURE: 76 MMHG | RESPIRATION RATE: 16 BRPM | TEMPERATURE: 98 F | OXYGEN SATURATION: 100 % | HEART RATE: 52 BPM | SYSTOLIC BLOOD PRESSURE: 118 MMHG

## 2019-01-05 VITALS
RESPIRATION RATE: 17 BRPM | DIASTOLIC BLOOD PRESSURE: 91 MMHG | SYSTOLIC BLOOD PRESSURE: 132 MMHG | TEMPERATURE: 98 F | HEART RATE: 107 BPM | OXYGEN SATURATION: 100 %

## 2019-01-05 DIAGNOSIS — Z98.890 OTHER SPECIFIED POSTPROCEDURAL STATES: Chronic | ICD-10-CM

## 2019-01-05 LAB — OB PNL STL: NEGATIVE — SIGNIFICANT CHANGE UP

## 2019-01-05 PROCEDURE — 74177 CT ABD & PELVIS W/CONTRAST: CPT | Mod: 26

## 2019-01-05 PROCEDURE — 86077 PHYS BLOOD BANK SERV XMATCH: CPT

## 2019-01-05 PROCEDURE — 99284 EMERGENCY DEPT VISIT MOD MDM: CPT | Mod: GC

## 2019-01-05 RX ORDER — SODIUM CHLORIDE 9 MG/ML
1000 INJECTION, SOLUTION INTRAVENOUS ONCE
Qty: 0 | Refills: 0 | Status: COMPLETED | OUTPATIENT
Start: 2019-01-05 | End: 2019-01-05

## 2019-01-05 RX ADMIN — SODIUM CHLORIDE 1000 MILLILITER(S): 9 INJECTION, SOLUTION INTRAVENOUS at 13:14

## 2019-01-05 NOTE — ED PROVIDER NOTE - PHYSICAL EXAMINATION
GENERAL: No acute distress, well-developed  HEAD:  Atraumatic, Normocephalic  ENT: EOMI, conjunctiva and sclera clear, Neck supple, No JVD, moist mucosa  CHEST/LUNG: Clear to auscultation bilaterally  HEART: tachycardic, regular rhythm; No murmurs, rubs, or gallops  ABDOMEN: Soft, Nontender, Nondistended; Bowel sounds present  RECTAL: Yellow-brown stool  EXTREMITIES:  No clubbing, cyanosis, or edema  PSYCH: Nl behavior, nl affect  NEUROLOGY: AAOx3, non-focal, cranial nerves intact  SKIN: Normal color, No rashes or lesions

## 2019-01-05 NOTE — ED PROVIDER NOTE - ATTENDING CONTRIBUTION TO CARE
Gregory: 85 yom with no abd pain but having nausea and vomiting since last night, started 24 hours ater eating meatballs. 2 episodes of loose stools, no blood, no fever. Sxs similar to 2 previous obstructions. Pt appears well, no distress, clear lungs, nml cardiac, abd soft decreased BS but no specific areas of tn. no edema, skin normal. CT, labs, fluids - reassess, antiemetics PRN.

## 2019-01-05 NOTE — ED PROVIDER NOTE - OBJECTIVE STATEMENT
86yo M with Colon CA (s/p hemicolectomy), recurrent SBo, recent GIB 2/2 duodenal ulcers p/w N/V/D x1 day. Patient reports dark NB, NB emesis since yesterday and two episodes of diarrhea, reportedly dark but not melena. Denies any Abd Pain, Fever, Chills, dysuria, CP, SOB, Dizziness. Denies any aggravating factors, eating out, etc. Denies ever having pain with recent admissions for GIB and SBO.

## 2019-01-05 NOTE — ED ADULT NURSE NOTE - OBJECTIVE STATEMENT
Pt brought to rm 7--pt has a history of a stomach ulcer and yesterday he vomited "BROWN COLORED MATERAL"---pt also had brown diarrhea  Pt appears in no acute distress at this time--v/s stable afebrile at this time. Pt had #20 placed lt forearm--labs drawn and sent--pt in NSR with rate 107  family at beside--pt in no acute distress at this time

## 2019-01-05 NOTE — ED PROVIDER NOTE - NS ED ROS FT
Positive: Nausea, Vomiting, Diarrhea    All other review of systems is negative unless indicated above.

## 2019-01-05 NOTE — ED PROVIDER NOTE - MEDICAL DECISION MAKING DETAILS
84yo M with Colon CA (s/p hemicolectomy), recurrent SBo, recent GIB 2/2 duodenal ulcers p/w N/V/D x1 day. Abdominal Exam benign and rectal with yellow-brown stool, less concern for recurrent GIB. Basic Labs and CT A/P to evaluate for infection vs SBO (no BM/flatus today). Re-evaluate pending workup.

## 2019-01-05 NOTE — ED ADULT NURSE NOTE - NSIMPLEMENTINTERV_GEN_ALL_ED
Implemented All Universal Safety Interventions:  [place] to call system. Call bell, personal items and telephone within reach. Instruct patient to call for assistance. Room bathroom lighting operational. Non-slip footwear when patient is off stretcher. Physically safe environment: no spills, clutter or unnecessary equipment. Stretcher in lowest position, wheels locked, appropriate side rails in place.

## 2019-01-05 NOTE — ED PROVIDER NOTE - NSFOLLOWUPINSTRUCTIONS_ED_ALL_ED_FT
Please continue taking your medications as prescribed, hold stool softeners until your bowel movements normalize.  Please follow-up with your PMD/GI within 1 week.  Please return to the ED and seek immediate medical care if you experience new or worsening symptoms including: worsening nausea or vomiting, abdominal pain, inability to eat.

## 2019-01-05 NOTE — ED ADULT TRIAGE NOTE - CHIEF COMPLAINT QUOTE
vomit ed x 2 yesterday x 1 during night. brown .  loose bm x 1 yesterday x 1 today   denies pain.  d/dylon   1 mo ago " bleeding ulcer"

## 2019-06-06 ENCOUNTER — INPATIENT (INPATIENT)
Facility: HOSPITAL | Age: 84
LOS: 2 days | Discharge: HOME | End: 2019-06-09
Attending: SURGERY | Admitting: SURGERY
Payer: MEDICARE

## 2019-06-06 VITALS
RESPIRATION RATE: 16 BRPM | HEART RATE: 54 BPM | OXYGEN SATURATION: 100 % | DIASTOLIC BLOOD PRESSURE: 81 MMHG | SYSTOLIC BLOOD PRESSURE: 114 MMHG | TEMPERATURE: 98 F

## 2019-06-06 DIAGNOSIS — Z98.890 OTHER SPECIFIED POSTPROCEDURAL STATES: Chronic | ICD-10-CM

## 2019-06-06 DIAGNOSIS — K56.609 UNSPECIFIED INTESTINAL OBSTRUCTION, UNSPECIFIED AS TO PARTIAL VERSUS COMPLETE OBSTRUCTION: ICD-10-CM

## 2019-06-06 LAB
ALBUMIN SERPL ELPH-MCNC: 3.6 G/DL — SIGNIFICANT CHANGE UP (ref 3.3–5)
ALP SERPL-CCNC: 61 U/L — SIGNIFICANT CHANGE UP (ref 40–120)
ALT FLD-CCNC: 11 U/L — SIGNIFICANT CHANGE UP (ref 4–41)
ANION GAP SERPL CALC-SCNC: 14 MMO/L — SIGNIFICANT CHANGE UP (ref 7–14)
APTT BLD: 29.7 SEC — SIGNIFICANT CHANGE UP (ref 27.5–36.3)
AST SERPL-CCNC: 19 U/L — SIGNIFICANT CHANGE UP (ref 4–40)
BASE EXCESS BLDV CALC-SCNC: 5.3 MMOL/L — SIGNIFICANT CHANGE UP
BASOPHILS # BLD AUTO: 0.02 K/UL — SIGNIFICANT CHANGE UP (ref 0–0.2)
BASOPHILS NFR BLD AUTO: 0.2 % — SIGNIFICANT CHANGE UP (ref 0–2)
BILIRUB SERPL-MCNC: 0.5 MG/DL — SIGNIFICANT CHANGE UP (ref 0.2–1.2)
BLD GP AB SCN SERPL QL: NEGATIVE — SIGNIFICANT CHANGE UP
BLOOD GAS VENOUS - CREATININE: 0.97 MG/DL — SIGNIFICANT CHANGE UP (ref 0.5–1.3)
BLOOD GAS VENOUS - FIO2: 21 — SIGNIFICANT CHANGE UP
BUN SERPL-MCNC: 24 MG/DL — HIGH (ref 7–23)
CALCIUM SERPL-MCNC: 9.2 MG/DL — SIGNIFICANT CHANGE UP (ref 8.4–10.5)
CHLORIDE BLDV-SCNC: 93 MMOL/L — LOW (ref 96–108)
CHLORIDE SERPL-SCNC: 90 MMOL/L — LOW (ref 98–107)
CO2 SERPL-SCNC: 27 MMOL/L — SIGNIFICANT CHANGE UP (ref 22–31)
CREAT SERPL-MCNC: 1.02 MG/DL — SIGNIFICANT CHANGE UP (ref 0.5–1.3)
EOSINOPHIL # BLD AUTO: 0.01 K/UL — SIGNIFICANT CHANGE UP (ref 0–0.5)
EOSINOPHIL NFR BLD AUTO: 0.1 % — SIGNIFICANT CHANGE UP (ref 0–6)
GAS PNL BLDV: 130 MMOL/L — LOW (ref 136–146)
GLUCOSE BLDV-MCNC: 107 MG/DL — HIGH (ref 70–99)
GLUCOSE SERPL-MCNC: 111 MG/DL — HIGH (ref 70–99)
HCO3 BLDV-SCNC: 27 MMOL/L — SIGNIFICANT CHANGE UP (ref 20–27)
HCT VFR BLD CALC: 37.1 % — LOW (ref 39–50)
HCT VFR BLD CALC: 37.7 % — LOW (ref 39–50)
HCT VFR BLDV CALC: 39.2 % — SIGNIFICANT CHANGE UP (ref 39–51)
HGB BLD-MCNC: 12.3 G/DL — LOW (ref 13–17)
HGB BLD-MCNC: 12.5 G/DL — LOW (ref 13–17)
HGB BLDV-MCNC: 12.7 G/DL — LOW (ref 13–17)
IMM GRANULOCYTES NFR BLD AUTO: 0.3 % — SIGNIFICANT CHANGE UP (ref 0–1.5)
INR BLD: 1.22 — HIGH (ref 0.88–1.17)
LACTATE BLDV-MCNC: 2 MMOL/L — SIGNIFICANT CHANGE UP (ref 0.5–2)
LYMPHOCYTES # BLD AUTO: 1.19 K/UL — SIGNIFICANT CHANGE UP (ref 1–3.3)
LYMPHOCYTES # BLD AUTO: 11 % — LOW (ref 13–44)
MCHC RBC-ENTMCNC: 29.6 PG — SIGNIFICANT CHANGE UP (ref 27–34)
MCHC RBC-ENTMCNC: 30.6 PG — SIGNIFICANT CHANGE UP (ref 27–34)
MCHC RBC-ENTMCNC: 32.6 % — SIGNIFICANT CHANGE UP (ref 32–36)
MCHC RBC-ENTMCNC: 33.7 % — SIGNIFICANT CHANGE UP (ref 32–36)
MCV RBC AUTO: 90.7 FL — SIGNIFICANT CHANGE UP (ref 80–100)
MCV RBC AUTO: 90.8 FL — SIGNIFICANT CHANGE UP (ref 80–100)
MONOCYTES # BLD AUTO: 0.81 K/UL — SIGNIFICANT CHANGE UP (ref 0–0.9)
MONOCYTES NFR BLD AUTO: 7.5 % — SIGNIFICANT CHANGE UP (ref 2–14)
NEUTROPHILS # BLD AUTO: 8.72 K/UL — HIGH (ref 1.8–7.4)
NEUTROPHILS NFR BLD AUTO: 80.9 % — HIGH (ref 43–77)
NRBC # FLD: 0 K/UL — SIGNIFICANT CHANGE UP (ref 0–0)
NRBC # FLD: 0 K/UL — SIGNIFICANT CHANGE UP (ref 0–0)
OB PNL STL: POSITIVE — SIGNIFICANT CHANGE UP
PCO2 BLDV: 51 MMHG — SIGNIFICANT CHANGE UP (ref 41–51)
PH BLDV: 7.39 PH — SIGNIFICANT CHANGE UP (ref 7.32–7.43)
PLATELET # BLD AUTO: 412 K/UL — HIGH (ref 150–400)
PLATELET # BLD AUTO: 412 K/UL — HIGH (ref 150–400)
PMV BLD: 8.8 FL — SIGNIFICANT CHANGE UP (ref 7–13)
PMV BLD: 8.8 FL — SIGNIFICANT CHANGE UP (ref 7–13)
PO2 BLDV: 26 MMHG — LOW (ref 35–40)
POTASSIUM BLDV-SCNC: 3.7 MMOL/L — SIGNIFICANT CHANGE UP (ref 3.4–4.5)
POTASSIUM SERPL-MCNC: 3.8 MMOL/L — SIGNIFICANT CHANGE UP (ref 3.5–5.3)
POTASSIUM SERPL-SCNC: 3.8 MMOL/L — SIGNIFICANT CHANGE UP (ref 3.5–5.3)
PROT SERPL-MCNC: 7.9 G/DL — SIGNIFICANT CHANGE UP (ref 6–8.3)
PROTHROM AB SERPL-ACNC: 13.6 SEC — HIGH (ref 9.8–13.1)
RBC # BLD: 4.09 M/UL — LOW (ref 4.2–5.8)
RBC # BLD: 4.15 M/UL — LOW (ref 4.2–5.8)
RBC # FLD: 14.3 % — SIGNIFICANT CHANGE UP (ref 10.3–14.5)
RBC # FLD: 14.3 % — SIGNIFICANT CHANGE UP (ref 10.3–14.5)
RH IG SCN BLD-IMP: POSITIVE — SIGNIFICANT CHANGE UP
SAO2 % BLDV: 42 % — LOW (ref 60–85)
SODIUM SERPL-SCNC: 131 MMOL/L — LOW (ref 135–145)
WBC # BLD: 10.78 K/UL — HIGH (ref 3.8–10.5)
WBC # BLD: 14.02 K/UL — HIGH (ref 3.8–10.5)
WBC # FLD AUTO: 10.78 K/UL — HIGH (ref 3.8–10.5)
WBC # FLD AUTO: 14.02 K/UL — HIGH (ref 3.8–10.5)

## 2019-06-06 PROCEDURE — 99222 1ST HOSP IP/OBS MODERATE 55: CPT | Mod: GC

## 2019-06-06 PROCEDURE — 74177 CT ABD & PELVIS W/CONTRAST: CPT | Mod: 26

## 2019-06-06 RX ORDER — ENOXAPARIN SODIUM 100 MG/ML
40 INJECTION SUBCUTANEOUS DAILY
Refills: 0 | Status: DISCONTINUED | OUTPATIENT
Start: 2019-06-06 | End: 2019-06-09

## 2019-06-06 RX ORDER — ONDANSETRON 8 MG/1
4 TABLET, FILM COATED ORAL ONCE
Refills: 0 | Status: COMPLETED | OUTPATIENT
Start: 2019-06-06 | End: 2019-06-06

## 2019-06-06 RX ORDER — SODIUM CHLORIDE 9 MG/ML
500 INJECTION INTRAMUSCULAR; INTRAVENOUS; SUBCUTANEOUS ONCE
Refills: 0 | Status: COMPLETED | OUTPATIENT
Start: 2019-06-06 | End: 2019-06-06

## 2019-06-06 RX ORDER — SODIUM CHLORIDE 9 MG/ML
1000 INJECTION, SOLUTION INTRAVENOUS
Refills: 0 | Status: DISCONTINUED | OUTPATIENT
Start: 2019-06-06 | End: 2019-06-09

## 2019-06-06 RX ORDER — PANTOPRAZOLE SODIUM 20 MG/1
40 TABLET, DELAYED RELEASE ORAL DAILY
Refills: 0 | Status: DISCONTINUED | OUTPATIENT
Start: 2019-06-06 | End: 2019-06-08

## 2019-06-06 RX ADMIN — SODIUM CHLORIDE 1000 MILLILITER(S): 9 INJECTION INTRAMUSCULAR; INTRAVENOUS; SUBCUTANEOUS at 10:59

## 2019-06-06 RX ADMIN — SODIUM CHLORIDE 100 MILLILITER(S): 9 INJECTION, SOLUTION INTRAVENOUS at 21:47

## 2019-06-06 RX ADMIN — ONDANSETRON 4 MILLIGRAM(S): 8 TABLET, FILM COATED ORAL at 10:59

## 2019-06-06 NOTE — H&P ADULT - ASSESSMENT
85M w/ adhesive SBO    - admit to B team surgery  - NPO/NGT  - IV fluid  - serial abd exams  - judicious pain meds - no pain as of now  - f/u GI fxn    B Huong WILKES  B Team [id number]

## 2019-06-06 NOTE — H&P ADULT - HISTORY OF PRESENT ILLNESS
85M w/ hx of laparoscopic R hemicolectomy for colon ca 10 years ago in [place] w/ several SBOs since then that have resolved nonoperatively presents with 2 days of nausea/vomiting. He has had loose stools recently that have appeared dark to his wife, although he takes iron pills. Last BM earlier in the day, last gas yesterday. He has been hiccoughing since he last vomited, which he says was similar to his last obstruction. He has not been able to tolerate PO since he ate Chinese food yesterday.  He denies CP, SOB, palpitations.  NG tube placed in ED with minimal output.  Vital Signs Last 24 Hrs  T(C): 37 (06 Jun 2019 20:15), Max: 37.3 (06 Jun 2019 10:46)  T(F): 98.6 (06 Jun 2019 20:15), Max: 99.1 (06 Jun 2019 10:46)  HR: 100 (06 Jun 2019 20:15) (54 - 114)  BP: 143/73 (06 Jun 2019 20:15) (114/81 - 146/83)  BP(mean): --  RR: 18 (06 Jun 2019 20:15) (16 - 18)  SpO2: 98% (06 Jun 2019 20:15) (98% - 100%)

## 2019-06-06 NOTE — H&P ADULT - NSHPLABSRESULTS_GEN_ALL_CORE
06-06    131<L>  |  90<L>  |  24<H>  ----------------------------<  111<H>  3.8   |  27  |  1.02    Ca    9.2      06 Jun 2019 10:50    TPro  7.9  /  Alb  3.6  /  TBili  0.5  /  DBili  x   /  AST  19  /  ALT  11  /  AlkPhos  61  06-06    CBC Full  -  ( 06 Jun 2019 14:36 )  WBC Count : 14.02 K/uL  RBC Count : 4.15 M/uL  Hemoglobin : 12.3 g/dL  Hematocrit : 37.7 %  Platelet Count - Automated : 412 K/uL  Mean Cell Volume : 90.8 fL  Mean Cell Hemoglobin : 29.6 pg  Mean Cell Hemoglobin Concentration : 32.6 %  Auto Neutrophil # : x  Auto Lymphocyte # : x  Auto Monocyte # : x  Auto Eosinophil # : x  Auto Basophil # : x  Auto Neutrophil % : x  Auto Lymphocyte % : x  Auto Monocyte % : x  Auto Eosinophil % : x  Auto Basophil % : x    < from: CT Abdomen and Pelvis w/ Oral Cont and w/ IV Cont (06.06.19 @ 16:35) >    FINDINGS:    LOWER CHEST: Coronary artery calcification. Redemonstrated fibrosis and   traction bronchiectasis.    LIVER: Within normal limits.  BILE DUCTS: Normal caliber.  GALLBLADDER: Within normal limits.  SPLEEN: Within normal limits.  PANCREAS: Within normal limits.  ADRENALS: Within normal limits.  KIDNEYS/URETERS: Bilateral renal cysts, measuring up to 10.1 cm on the   right, unchanged. Additional subcentimeterhypodensities bilaterally too   small to characterize. Nonobstructing 5 mm right upper pole renal   calculus. No hydronephrosis. The bilateral kidneys enhance symmetrically.    BLADDER: Within normal limits.  REPRODUCTIVE ORGANS: Prostate is enlarged.    BOWEL: Mildly dilated fluid and contrast-filled loops of small bowel   measuring up to 3.8 cm, with probable transition point in the right   midabdomen (2:55). The more distal small bowel loops are decompressed..   No bowel wall thickening or surrounding inflammatory change. Status post   right hemicolectomy.  PERITONEUM: No ascites. No pneumoperitoneum.  VESSELS:  Atherosclerosis.  RETROPERITONEUM: No lymphadenopathy.    ABDOMINAL WALL: Unchanged small umbilical hernia containing a loop of   nonobstructed bowel.  BONES: Degenerative changes of the spine. Unchanged grade 1   anterolisthesis of L4 on L5.    IMPRESSION:     Mildly dilated loops of fluid-filled small bowel with probable transition   point in the right midabdomen, concerning for small bowel obstruction.        < end of copied text >

## 2019-06-06 NOTE — ED PROVIDER NOTE - PROGRESS NOTE DETAILS
Resident: Abel Meng - Pt now with tachycardia, says that he still feels well, has not vomited here. Stool on rectal was non-melanotic however Occult was positive. He has Hg higher than his normal today, low concern for serious GI bleeding. Family here very concerned that patient is obstructed and pt now anxious about same, feel that tachycardia may be related. Will PO trial pt to confirm not obstructed since hx and exam no signs of obstruction. Will repeat H/H at 4 hours and get in touch with pts GI doctor for close follow up if planning on DCing after repeat H/H. pt pending ct abdomen/pelvis. no vomit in ED. no diarrhea. Denies any abdominal pain. afrebile. Noted -117 appears anxious. Family with pt. Endorse to Dr Pearson.

## 2019-06-06 NOTE — H&P ADULT - NSICDXPASTMEDICALHX_GEN_ALL_CORE_FT
PAST MEDICAL HISTORY:  Douglas esophagus     HTN (hypertension)     PVD (peripheral vascular disease)

## 2019-06-06 NOTE — ED PROVIDER NOTE - CCCP TRG CHIEF CMPLNT
Initiate Treatment: Mupirocin ointment twice daily. Use Aquaphor when not using Mupirocin. Detail Level: Zone Plan: Consider biopsy if not improved. Photo taken today. Initiate Treatment: Valacyclovir 500mg one tab once daily. For outbreaks take two pills daily until resolved. Patient will have culture results faxed to us from [facility]. N/V/D

## 2019-06-06 NOTE — ED ADULT NURSE NOTE - NSIMPLEMENTINTERV_GEN_ALL_ED
Implemented All Fall Risk Interventions:  [place] to call system. Call bell, personal items and telephone within reach. Instruct patient to call for assistance. Room bathroom lighting operational. Non-slip footwear when patient is off stretcher. Physically safe environment: no spills, clutter or unnecessary equipment. Stretcher in lowest position, wheels locked, appropriate side rails in place. Provide visual cue, wrist band, yellow gown, etc. Monitor gait and stability. Monitor for mental status changes and reorient to person, place, and time. Review medications for side effects contributing to fall risk. Reinforce activity limits and safety measures with patient and family.

## 2019-06-06 NOTE — ED PROVIDER NOTE - OBJECTIVE STATEMENT
86 yo male with PMH of colon cancer s/p R hemicolectomy 10 yrs ago, multiple SBO, Douglas's, RBBB present with 3 days of Melena and vomiting. Pt is poor historian but says he has been here multiple times for this and needs transfusions, however he says something about needing scar tissue removed. DC note from November says that pt was found to have to Duodenal ulcers and needed blood transfusions. Pt says that he has been feeling relatively fine but they were worried that he was bleeding again so they came to the ER.

## 2019-06-06 NOTE — H&P ADULT - NSHPPHYSICALEXAM_GEN_ALL_CORE
NAD, awake and alert  No rashes  No jaundice or scleral icterus  Respirations nonlabored  CV Regular  Abdomen soft, nontender, nondistended  No guarding or rebound tenderness  Laparoscopic incisions well healed  Extremities warm

## 2019-06-06 NOTE — ED PROVIDER NOTE - CLINICAL SUMMARY MEDICAL DECISION MAKING FREE TEXT BOX
85 Y m with hx of Colon Ca and UGI bleed presents with melena x 3 days, nausea and vomiting, concerned that he is having another GI bleed, mild tachycardia with normotension here. Will get labs, likely admit for GI bleed.

## 2019-06-06 NOTE — ED ADULT NURSE NOTE - OBJECTIVE STATEMENT
Pt received a&ox3, c/o vomiting / diarrhea x 1 day, pt denies abd pain, abd soft non tender non distended, pt states hx of similar symptoms, pt poor historian, pt denies blood in vomit/stool, appears comfortable, 20 gauge IV placed in right forearm, labs drawn and sent, vss as reported, MD tate performed, will continue to monitor.

## 2019-06-06 NOTE — ED PROVIDER NOTE - ATTENDING CONTRIBUTION TO CARE
Attending Statement: I have personally seen and examined this patient. I have fully participated in the care of this patient. I have reviewed all pertinent clinical information, including history physical exam, plan and the Resident's note and agree except as noted  84yo M hx of colon ca hx right hemicolectomy, SOB, pw "dark stool" x one day. States he  had "dark stool yesterday and this mornign" no BRBPR  no abdominal pain. not dizzy or lightheaded. no fever or chills. not on AC>   Vital signs noted. mmm. non icteric. not pale. normal S1-S2 No resp distress. able to speak in full and clear sentences. no wheeze, rales or stridor. soft nt abdomen. rectal by resident.  plan ekg, labs, tele monitoring and re assess Attending Statement: I have personally seen and examined this patient. I have fully participated in the care of this patient. I have reviewed all pertinent clinical information, including history physical exam, plan and the Resident's note and agree except as noted  86yo M hx of colon ca hx right hemicolectomy, SOB, pw "dark stool" x one day. States he  had "dark stool yesterday and this morning" no BRBPR  no abdominal pain. not dizzy or lightheaded. no fever or chills. not on AC> Had two episodes of dark, vomit. no bright blood. no vomit today. +flatus.   Vital signs noted. mmm. non icteric. not pale. normal S1-S2 No resp distress. able to speak in full and clear sentences. no wheeze, rales or stridor. soft nt abdomen. rectal by resident.  plan ekg, labs, tele monitoring and re assess Attending Statement: I have personally seen and examined this patient. I have fully participated in the care of this patient. I have reviewed all pertinent clinical information, including history physical exam, plan and the Resident's note and agree except as noted  84yo M hx of colon ca hx right hemicolectomy, SOB, pw "dark stool" x one day. States he  had "dark stool yesterday and this morning" no BRBPR  no abdominal pain. not dizzy or lightheaded. no fever or chills. not on AC> Had two episodes of dark, vomit. no bright blood. no vomit today. +flatus.   Vital signs noted.  not tachycardic or hypotensive  mmm. non icteric. not pale. normal S1-S2 No resp distress. able to speak in full and clear sentences. no wheeze, rales or stridor. soft nt abdomen. rectal by resident. +BS noted. no rebound or guarding.   plan ekg, labs, tele monitoring and re assess

## 2019-06-07 LAB
ANION GAP SERPL CALC-SCNC: 14 MMO/L — SIGNIFICANT CHANGE UP (ref 7–14)
APTT BLD: 31.6 SEC — SIGNIFICANT CHANGE UP (ref 27.5–36.3)
BUN SERPL-MCNC: 20 MG/DL — SIGNIFICANT CHANGE UP (ref 7–23)
CALCIUM SERPL-MCNC: 8.8 MG/DL — SIGNIFICANT CHANGE UP (ref 8.4–10.5)
CHLORIDE SERPL-SCNC: 95 MMOL/L — LOW (ref 98–107)
CO2 SERPL-SCNC: 28 MMOL/L — SIGNIFICANT CHANGE UP (ref 22–31)
CREAT SERPL-MCNC: 0.92 MG/DL — SIGNIFICANT CHANGE UP (ref 0.5–1.3)
GLUCOSE SERPL-MCNC: 81 MG/DL — SIGNIFICANT CHANGE UP (ref 70–99)
HCT VFR BLD CALC: 39.2 % — SIGNIFICANT CHANGE UP (ref 39–50)
HGB BLD-MCNC: 12.7 G/DL — LOW (ref 13–17)
INR BLD: 1.2 — HIGH (ref 0.88–1.17)
MAGNESIUM SERPL-MCNC: 1.6 MG/DL — SIGNIFICANT CHANGE UP (ref 1.6–2.6)
MCHC RBC-ENTMCNC: 29.8 PG — SIGNIFICANT CHANGE UP (ref 27–34)
MCHC RBC-ENTMCNC: 32.4 % — SIGNIFICANT CHANGE UP (ref 32–36)
MCV RBC AUTO: 92 FL — SIGNIFICANT CHANGE UP (ref 80–100)
NRBC # FLD: 0 K/UL — SIGNIFICANT CHANGE UP (ref 0–0)
PHOSPHATE SERPL-MCNC: 3.2 MG/DL — SIGNIFICANT CHANGE UP (ref 2.5–4.5)
PLATELET # BLD AUTO: 307 K/UL — SIGNIFICANT CHANGE UP (ref 150–400)
PMV BLD: 9.9 FL — SIGNIFICANT CHANGE UP (ref 7–13)
POTASSIUM SERPL-MCNC: 3.8 MMOL/L — SIGNIFICANT CHANGE UP (ref 3.5–5.3)
POTASSIUM SERPL-SCNC: 3.8 MMOL/L — SIGNIFICANT CHANGE UP (ref 3.5–5.3)
PROTHROM AB SERPL-ACNC: 13.4 SEC — HIGH (ref 9.8–13.1)
RBC # BLD: 4.26 M/UL — SIGNIFICANT CHANGE UP (ref 4.2–5.8)
RBC # FLD: 14.4 % — SIGNIFICANT CHANGE UP (ref 10.3–14.5)
SODIUM SERPL-SCNC: 137 MMOL/L — SIGNIFICANT CHANGE UP (ref 135–145)
WBC # BLD: 10.27 K/UL — SIGNIFICANT CHANGE UP (ref 3.8–10.5)
WBC # FLD AUTO: 10.27 K/UL — SIGNIFICANT CHANGE UP (ref 3.8–10.5)

## 2019-06-07 PROCEDURE — 99232 SBSQ HOSP IP/OBS MODERATE 35: CPT

## 2019-06-07 RX ADMIN — PANTOPRAZOLE SODIUM 40 MILLIGRAM(S): 20 TABLET, DELAYED RELEASE ORAL at 12:16

## 2019-06-07 RX ADMIN — ENOXAPARIN SODIUM 40 MILLIGRAM(S): 100 INJECTION SUBCUTANEOUS at 12:16

## 2019-06-07 NOTE — PROGRESS NOTE ADULT - SUBJECTIVE AND OBJECTIVE BOX
GENERAL SURGERY DAILY PROGRESS NOTE:     Subjective:  Pt seen and examined. No acute events overnight. Denies any pain. Denies n/v. Denies flatus/bms, and reports he last had diarrhea prior to admission.     Objective:    MEDICATIONS  (STANDING):  enoxaparin Injectable 40 milliGRAM(s) SubCutaneous daily  lactated ringers. 1000 milliLiter(s) (100 mL/Hr) IV Continuous <Continuous>  pantoprazole  Injectable 40 milliGRAM(s) IV Push daily    MEDICATIONS  (PRN):      Vital Signs Last 24 Hrs  T(C): 36.4 (07 Jun 2019 02:07), Max: 37.3 (06 Jun 2019 10:46)  T(F): 97.5 (07 Jun 2019 02:07), Max: 99.1 (06 Jun 2019 10:46)  HR: 104 (07 Jun 2019 02:07) (54 - 114)  BP: 122/77 (07 Jun 2019 02:07) (114/81 - 146/83)  BP(mean): --  RR: 16 (07 Jun 2019 02:07) (16 - 18)  SpO2: 98% (07 Jun 2019 02:07) (98% - 100%)    I&O's Detail    06 Jun 2019 07:01  -  07 Jun 2019 07:00  --------------------------------------------------------  IN:  Total IN: 0 mL    OUT:    Nasoenteral Tube: 700 mL    Voided: 750 mL  Total OUT: 1450 mL    Total NET: -1450 mL    PHYSICAL EXAM  NAD, awake and alert  Respirations nonlabored  Abdomen soft, nontender, nondistended, NGT in place  No guarding or rebound tenderness        Daily     Daily     LABS:                        12.7   10.27 )-----------( 307      ( 07 Jun 2019 05:51 )             39.2     06-07    137  |  95<L>  |  20  ----------------------------<  81  3.8   |  28  |  0.92    Ca    8.8      07 Jun 2019 05:51  Phos  3.2     06-07  Mg     1.6     06-07    TPro  7.9  /  Alb  3.6  /  TBili  0.5  /  DBili  x   /  AST  19  /  ALT  11  /  AlkPhos  61  06-06    PT/INR - ( 07 Jun 2019 09:10 )   PT: 13.4 SEC;   INR: 1.20          PTT - ( 07 Jun 2019 09:10 )  PTT:31.6 SEC      RADIOLOGY & ADDITIONAL STUDIES:

## 2019-06-07 NOTE — PROGRESS NOTE ADULT - ASSESSMENT
85M w/ adhesive SBO    - NPO/NGT  - SBFT  - IV fluid  - serial abd exams  - judicious pain meds - no pain as of now  - f/u GI fxn  - dvt ppx    B Team [id number]

## 2019-06-08 LAB
ANION GAP SERPL CALC-SCNC: 17 MMO/L — HIGH (ref 7–14)
BUN SERPL-MCNC: 20 MG/DL — SIGNIFICANT CHANGE UP (ref 7–23)
CALCIUM SERPL-MCNC: 9.2 MG/DL — SIGNIFICANT CHANGE UP (ref 8.4–10.5)
CHLORIDE SERPL-SCNC: 94 MMOL/L — LOW (ref 98–107)
CO2 SERPL-SCNC: 26 MMOL/L — SIGNIFICANT CHANGE UP (ref 22–31)
CREAT SERPL-MCNC: 0.87 MG/DL — SIGNIFICANT CHANGE UP (ref 0.5–1.3)
GLUCOSE SERPL-MCNC: 67 MG/DL — LOW (ref 70–99)
HCT VFR BLD CALC: 36.8 % — LOW (ref 39–50)
HGB BLD-MCNC: 12.2 G/DL — LOW (ref 13–17)
MAGNESIUM SERPL-MCNC: 1.8 MG/DL — SIGNIFICANT CHANGE UP (ref 1.6–2.6)
MCHC RBC-ENTMCNC: 30.3 PG — SIGNIFICANT CHANGE UP (ref 27–34)
MCHC RBC-ENTMCNC: 33.2 % — SIGNIFICANT CHANGE UP (ref 32–36)
MCV RBC AUTO: 91.3 FL — SIGNIFICANT CHANGE UP (ref 80–100)
NRBC # FLD: 0.02 K/UL — SIGNIFICANT CHANGE UP (ref 0–0)
PHOSPHATE SERPL-MCNC: 2.6 MG/DL — SIGNIFICANT CHANGE UP (ref 2.5–4.5)
PLATELET # BLD AUTO: 385 K/UL — SIGNIFICANT CHANGE UP (ref 150–400)
PMV BLD: 9.7 FL — SIGNIFICANT CHANGE UP (ref 7–13)
POTASSIUM SERPL-MCNC: 3.5 MMOL/L — SIGNIFICANT CHANGE UP (ref 3.5–5.3)
POTASSIUM SERPL-SCNC: 3.5 MMOL/L — SIGNIFICANT CHANGE UP (ref 3.5–5.3)
RBC # BLD: 4.03 M/UL — LOW (ref 4.2–5.8)
RBC # FLD: 14.1 % — SIGNIFICANT CHANGE UP (ref 10.3–14.5)
SODIUM SERPL-SCNC: 137 MMOL/L — SIGNIFICANT CHANGE UP (ref 135–145)
WBC # BLD: 12.08 K/UL — HIGH (ref 3.8–10.5)
WBC # FLD AUTO: 12.08 K/UL — HIGH (ref 3.8–10.5)

## 2019-06-08 PROCEDURE — 99232 SBSQ HOSP IP/OBS MODERATE 35: CPT

## 2019-06-08 RX ORDER — FUROSEMIDE 40 MG
20 TABLET ORAL DAILY
Refills: 0 | Status: DISCONTINUED | OUTPATIENT
Start: 2019-06-08 | End: 2019-06-09

## 2019-06-08 RX ORDER — POTASSIUM CHLORIDE 20 MEQ
20 PACKET (EA) ORAL ONCE
Refills: 0 | Status: COMPLETED | OUTPATIENT
Start: 2019-06-08 | End: 2019-06-08

## 2019-06-08 RX ORDER — LANOLIN ALCOHOL/MO/W.PET/CERES
3 CREAM (GRAM) TOPICAL AT BEDTIME
Refills: 0 | Status: DISCONTINUED | OUTPATIENT
Start: 2019-06-08 | End: 2019-06-09

## 2019-06-08 RX ORDER — CHOLECALCIFEROL (VITAMIN D3) 125 MCG
1000 CAPSULE ORAL DAILY
Refills: 0 | Status: DISCONTINUED | OUTPATIENT
Start: 2019-06-08 | End: 2019-06-09

## 2019-06-08 RX ORDER — SODIUM,POTASSIUM PHOSPHATES 278-250MG
1 POWDER IN PACKET (EA) ORAL ONCE
Refills: 0 | Status: COMPLETED | OUTPATIENT
Start: 2019-06-08 | End: 2019-06-08

## 2019-06-08 RX ORDER — PANTOPRAZOLE SODIUM 20 MG/1
40 TABLET, DELAYED RELEASE ORAL
Refills: 0 | Status: DISCONTINUED | OUTPATIENT
Start: 2019-06-08 | End: 2019-06-09

## 2019-06-08 RX ADMIN — ENOXAPARIN SODIUM 40 MILLIGRAM(S): 100 INJECTION SUBCUTANEOUS at 13:19

## 2019-06-08 RX ADMIN — SODIUM CHLORIDE 100 MILLILITER(S): 9 INJECTION, SOLUTION INTRAVENOUS at 22:02

## 2019-06-08 RX ADMIN — Medication 3 MILLIGRAM(S): at 22:02

## 2019-06-08 RX ADMIN — PANTOPRAZOLE SODIUM 40 MILLIGRAM(S): 20 TABLET, DELAYED RELEASE ORAL at 13:19

## 2019-06-08 RX ADMIN — Medication 20 MILLIEQUIVALENT(S): at 11:16

## 2019-06-08 RX ADMIN — Medication 1 DROP(S): at 16:28

## 2019-06-08 RX ADMIN — Medication 1 PACKET(S): at 11:16

## 2019-06-08 NOTE — PROGRESS NOTE ADULT - ATTENDING COMMENTS
Seen and examined, chart and note reviewed, case discussed with B team    a.  With BM/flatus  b.  NPO, may have clears  c.  Decrease IVF  d.  DVT prophylaxis  e.  Await GI function

## 2019-06-08 NOTE — PROGRESS NOTE ADULT - ASSESSMENT
85M w/ adhesive SBO    - NPO/NGT, possible NGT removal given sbft   - f/u sbft read  - IV fluid  - serial abd exams  - judicious pain meds   - f/u GI fxn  - dvt ppx    B Team [id number]

## 2019-06-08 NOTE — PROVIDER CONTACT NOTE (OTHER) - SITUATION
patient is hallucinating as per family members at bedside. daughter reports that patient appears confused and is seeing things on the floor

## 2019-06-08 NOTE — PROVIDER CONTACT NOTE (OTHER) - ASSESSMENT
pt is axox3 but disoriented to time. patient states he saw himself briefly on the floor for short period of time. RN assess patient at bedside .patient states he no longer see things on the floor. patient is able to state name, and location and also recognized family members and medical staff

## 2019-06-08 NOTE — PROGRESS NOTE ADULT - SUBJECTIVE AND OBJECTIVE BOX
GENERAL SURGERY DAILY PROGRESS NOTE:     Subjective:  Pt seen and examined. No acute events overnight. Yesterday pt underwent sbft, contrast appears in colon, however will f/u final read. Denies pain. Voiding well.     Objective:    MEDICATIONS  (STANDING):  enoxaparin Injectable 40 milliGRAM(s) SubCutaneous daily  lactated ringers. 1000 milliLiter(s) (100 mL/Hr) IV Continuous <Continuous>  pantoprazole  Injectable 40 milliGRAM(s) IV Push daily    MEDICATIONS  (PRN):      Vital Signs Last 24 Hrs  T(C): 36.3 (08 Jun 2019 02:00), Max: 36.7 (07 Jun 2019 20:49)  T(F): 97.3 (08 Jun 2019 02:00), Max: 98.1 (07 Jun 2019 20:49)  HR: 95 (08 Jun 2019 02:00) (62 - 106)  BP: 139/82 (08 Jun 2019 02:00) (133/75 - 153/99)  BP(mean): --  RR: 18 (08 Jun 2019 02:00) (16 - 18)  SpO2: 97% (08 Jun 2019 02:00) (97% - 100%)    I&O's Detail    06 Jun 2019 07:01  -  07 Jun 2019 07:00  --------------------------------------------------------  IN:  Total IN: 0 mL    OUT:    Nasoenteral Tube: 700 mL    Voided: 750 mL  Total OUT: 1450 mL    Total NET: -1450 mL      07 Jun 2019 07:01  -  08 Jun 2019 06:32  --------------------------------------------------------  IN:    lactated ringers.: 1600 mL  Total IN: 1600 mL    OUT:    Nasoenteral Tube: 625 mL    Voided: 850 mL  Total OUT: 1475 mL    Total NET: 125 mL    PHYSICAL EXAM  NAD, awake and alert  Respirations nonlabored  Abdomen soft, nontender, nondistended, NGT in place  No guarding or rebound tenderness      Daily     Daily     LABS:                        12.7   10.27 )-----------( 307      ( 07 Jun 2019 05:51 )             39.2     06-07    137  |  95<L>  |  20  ----------------------------<  81  3.8   |  28  |  0.92    Ca    8.8      07 Jun 2019 05:51  Phos  3.2     06-07  Mg     1.6     06-07    TPro  7.9  /  Alb  3.6  /  TBili  0.5  /  DBili  x   /  AST  19  /  ALT  11  /  AlkPhos  61  06-06    PT/INR - ( 07 Jun 2019 09:10 )   PT: 13.4 SEC;   INR: 1.20          PTT - ( 07 Jun 2019 09:10 )  PTT:31.6 SEC      RADIOLOGY & ADDITIONAL STUDIES:

## 2019-06-08 NOTE — PROVIDER CONTACT NOTE (OTHER) - ACTION/TREATMENT ORDERED:
MD came to bedside to assess pt . As per MD order, bed alarm and safety precaution. neurology consult ordered for patient

## 2019-06-09 ENCOUNTER — TRANSCRIPTION ENCOUNTER (OUTPATIENT)
Age: 84
End: 2019-06-09

## 2019-06-09 VITALS
OXYGEN SATURATION: 100 % | RESPIRATION RATE: 16 BRPM | HEART RATE: 99 BPM | SYSTOLIC BLOOD PRESSURE: 138 MMHG | DIASTOLIC BLOOD PRESSURE: 100 MMHG | TEMPERATURE: 98 F

## 2019-06-09 LAB
ANION GAP SERPL CALC-SCNC: 13 MMO/L — SIGNIFICANT CHANGE UP (ref 7–14)
BUN SERPL-MCNC: 11 MG/DL — SIGNIFICANT CHANGE UP (ref 7–23)
CALCIUM SERPL-MCNC: 9.1 MG/DL — SIGNIFICANT CHANGE UP (ref 8.4–10.5)
CHLORIDE SERPL-SCNC: 96 MMOL/L — LOW (ref 98–107)
CO2 SERPL-SCNC: 26 MMOL/L — SIGNIFICANT CHANGE UP (ref 22–31)
CREAT SERPL-MCNC: 0.86 MG/DL — SIGNIFICANT CHANGE UP (ref 0.5–1.3)
GLUCOSE SERPL-MCNC: 110 MG/DL — HIGH (ref 70–99)
HCT VFR BLD CALC: 36.3 % — LOW (ref 39–50)
HGB BLD-MCNC: 12 G/DL — LOW (ref 13–17)
MAGNESIUM SERPL-MCNC: 1.6 MG/DL — SIGNIFICANT CHANGE UP (ref 1.6–2.6)
MCHC RBC-ENTMCNC: 29.5 PG — SIGNIFICANT CHANGE UP (ref 27–34)
MCHC RBC-ENTMCNC: 33.1 % — SIGNIFICANT CHANGE UP (ref 32–36)
MCV RBC AUTO: 89.2 FL — SIGNIFICANT CHANGE UP (ref 80–100)
NRBC # FLD: 0 K/UL — SIGNIFICANT CHANGE UP (ref 0–0)
PHOSPHATE SERPL-MCNC: 1.7 MG/DL — LOW (ref 2.5–4.5)
PLATELET # BLD AUTO: 361 K/UL — SIGNIFICANT CHANGE UP (ref 150–400)
PMV BLD: 9.4 FL — SIGNIFICANT CHANGE UP (ref 7–13)
POTASSIUM SERPL-MCNC: 3.1 MMOL/L — LOW (ref 3.5–5.3)
POTASSIUM SERPL-SCNC: 3.1 MMOL/L — LOW (ref 3.5–5.3)
RBC # BLD: 4.07 M/UL — LOW (ref 4.2–5.8)
RBC # FLD: 14 % — SIGNIFICANT CHANGE UP (ref 10.3–14.5)
SODIUM SERPL-SCNC: 135 MMOL/L — SIGNIFICANT CHANGE UP (ref 135–145)
WBC # BLD: 11.08 K/UL — HIGH (ref 3.8–10.5)
WBC # FLD AUTO: 11.08 K/UL — HIGH (ref 3.8–10.5)

## 2019-06-09 PROCEDURE — 74250 X-RAY XM SM INT 1CNTRST STD: CPT | Mod: 26

## 2019-06-09 PROCEDURE — 99231 SBSQ HOSP IP/OBS SF/LOW 25: CPT

## 2019-06-09 RX ORDER — SODIUM,POTASSIUM PHOSPHATES 278-250MG
2 POWDER IN PACKET (EA) ORAL
Refills: 0 | Status: COMPLETED | OUTPATIENT
Start: 2019-06-09 | End: 2019-06-09

## 2019-06-09 RX ORDER — POTASSIUM CHLORIDE 20 MEQ
20 PACKET (EA) ORAL
Refills: 0 | Status: COMPLETED | OUTPATIENT
Start: 2019-06-09 | End: 2019-06-09

## 2019-06-09 RX ORDER — SODIUM,POTASSIUM PHOSPHATES 278-250MG
1 POWDER IN PACKET (EA) ORAL
Refills: 0 | Status: COMPLETED | OUTPATIENT
Start: 2019-06-09 | End: 2019-06-09

## 2019-06-09 RX ADMIN — Medication 20 MILLIEQUIVALENT(S): at 13:43

## 2019-06-09 RX ADMIN — ENOXAPARIN SODIUM 40 MILLIGRAM(S): 100 INJECTION SUBCUTANEOUS at 11:50

## 2019-06-09 RX ADMIN — Medication 5 MILLIGRAM(S): at 06:56

## 2019-06-09 RX ADMIN — Medication 20 MILLIGRAM(S): at 06:56

## 2019-06-09 RX ADMIN — Medication 1000 UNIT(S): at 11:51

## 2019-06-09 RX ADMIN — Medication 20 MILLIEQUIVALENT(S): at 11:50

## 2019-06-09 RX ADMIN — Medication 1 TABLET(S): at 11:51

## 2019-06-09 RX ADMIN — PANTOPRAZOLE SODIUM 40 MILLIGRAM(S): 20 TABLET, DELAYED RELEASE ORAL at 06:56

## 2019-06-09 RX ADMIN — Medication 85 MILLIMOLE(S): at 09:34

## 2019-06-09 RX ADMIN — Medication 20 MILLIEQUIVALENT(S): at 09:34

## 2019-06-09 RX ADMIN — Medication 2 TABLET(S): at 15:22

## 2019-06-09 RX ADMIN — Medication 2 TABLET(S): at 15:23

## 2019-06-09 NOTE — PROVIDER CONTACT NOTE (OTHER) - SITUATION
Pt having period of confusion, disoriented states it is morning and is asking to speak with his mother. Pt is insisting on getting out of bed to brush teeth. Pt states that he is at home.

## 2019-06-09 NOTE — DISCHARGE NOTE PROVIDER - CARE PROVIDER_API CALL
Judith Fox)  Dietitian nutritionist; Surgery; Surgical Critical Care  [address], [address] [address]  Phone: [phone]  Fax: [phone]  Follow Up Time:

## 2019-06-09 NOTE — DISCHARGE NOTE PROVIDER - NSDCFUADDINST_GEN_ALL_CORE_FT
ACTIVITY: No heavy lifting or straining. Otherwise, you may return to your usual level of physical activity. If you are taking narcotic pain medication (such as Percocet) DO NOT drive a car, operate machinery or make important decisions.  DIET: Return to your usual diet.  NOTIFY YOUR SURGEON IF: You have any bleeding that does not stop, any pus draining from your wound(s), any fever (over 100.4 F) or chills, persistent nausea/vomiting, persistent diarrhea, or if your pain is not controlled on your discharge pain medications.  FOLLOW-UP:   -Please follow up with your primary care physician in one week regarding your hospitalization  -Please follow up with Dr. Fox ([phone]) in office in 1-2 weeks following surgery.

## 2019-06-09 NOTE — PROGRESS NOTE ADULT - ASSESSMENT
85M w/ adhesive SBO, w/o NGT tolerating CLD.     - CLD  - f/u bowel function  - neuro consult regarding hallucination  - f/u w/ PCP  - IV fluid  - serial abd exams  - dvt ppx    B Team [id number]

## 2019-06-09 NOTE — DISCHARGE NOTE NURSING/CASE MANAGEMENT/SOCIAL WORK - NSDCPNINST_GEN_ALL_CORE
Please NOTIFY MD for any of the following s/s: S/S infection (Fever >100.4, chills, New onset abdominal pain or uncontrolled pain not relieved by pain medications, persistent nausea/vomiting or inability to tolerate diet. No heavy lifting; No driving while taking narcotic pain medications. Please drink 6-8 glasses of water daily to stay hydrated.

## 2019-06-09 NOTE — PROGRESS NOTE ADULT - SUBJECTIVE AND OBJECTIVE BOX
GENERAL SURGERY DAILY PROGRESS NOTE:     Subjective:  Pt seen and examined. No acute events overnight. Yesterday NGT removed and tolerated cld. Reports having diarrhea but no gas. Per pt and family, pt having transient episodes of hallucinations - this has not happened before. Pt alert and oriented, however per family is confused during episodes. Message left with PCP regarding episodes.     Objective:    MEDICATIONS  (STANDING):  cholecalciferol 1000 Unit(s) Oral daily  enoxaparin Injectable 40 milliGRAM(s) SubCutaneous daily  furosemide    Tablet 20 milliGRAM(s) Oral daily  lactated ringers. 1000 milliLiter(s) (100 mL/Hr) IV Continuous <Continuous>  melatonin 3 milliGRAM(s) Oral at bedtime  pantoprazole    Tablet 40 milliGRAM(s) Oral before breakfast  predniSONE   Tablet 5 milliGRAM(s) Oral daily    MEDICATIONS  (PRN):  artificial  tears Solution 1 Drop(s) Both EYES three times a day PRN Dry Eyes      Vital Signs Last 24 Hrs  T(C): 36.8 (08 Jun 2019 21:53), Max: 36.8 (08 Jun 2019 10:33)  T(F): 98.2 (08 Jun 2019 21:53), Max: 98.3 (08 Jun 2019 10:33)  HR: 97 (08 Jun 2019 21:53) (85 - 98)  BP: 128/84 (08 Jun 2019 21:53) (122/75 - 140/85)  BP(mean): --  RR: 18 (08 Jun 2019 21:53) (16 - 18)  SpO2: 98% (08 Jun 2019 21:53) (97% - 100%)    I&O's Detail    07 Jun 2019 07:01  -  08 Jun 2019 07:00  --------------------------------------------------------  IN:    lactated ringers.: 2200 mL  Total IN: 2200 mL    OUT:    Nasoenteral Tube: 775 mL    Voided: 850 mL  Total OUT: 1625 mL    Total NET: 575 mL      08 Jun 2019 07:01  -  09 Jun 2019 00:25  --------------------------------------------------------  IN:  Total IN: 0 mL    OUT:    Voided: 800 mL  Total OUT: 800 mL    Total NET: -800 mL    PHYSICAL EXAM  NAD, awake and alert  Respirations nonlabored  Abdomen soft, nontender, nondistended  No guarding or rebound tenderness      Daily     Daily     LABS:                        12.2   12.08 )-----------( 385      ( 08 Jun 2019 06:00 )             36.8     06-08    137  |  94<L>  |  20  ----------------------------<  67<L>  3.5   |  26  |  0.87    Ca    9.2      08 Jun 2019 06:00  Phos  2.6     06-08  Mg     1.8     06-08      PT/INR - ( 07 Jun 2019 09:10 )   PT: 13.4 SEC;   INR: 1.20          PTT - ( 07 Jun 2019 09:10 )  PTT:31.6 SEC      RADIOLOGY & ADDITIONAL STUDIES:

## 2019-06-09 NOTE — DISCHARGE NOTE PROVIDER - HOSPITAL COURSE
85M w/ hx of laparoscopic R hemicolectomy for colon ca 10 years ago in [place] w/ several SBOs since then that have resolved nonoperatively who presented with 2 days of nausea/vomiting on 6/6. Prior to presentation, he had had loose stools that appeared dark. Of note, he takes iron pills. His last BM was earlier in the day, and last flatus was the previous day. Pt endorsed emesis and hiccuping. He reported symptoms were similar to his last obstruction, and that he had not been able to tolerate PO since he ate the previous night. Also denied chest pain, SOB, palpitations.    A CTAP w/ PO IV contrast showed small bowel obstruction with transition point in the right midabdomen. Pt was managed conservatively with NGT, NPO, IVF. A small bowel follow through on 6/7 revealed contrast in colon. Patient had return of bowel function on 6/8 with positive BM (diarrhea). NGT was removed on 6/8 and diet was advanced to clears on 6/8 which he tolerated, and then to regular diet on 6/9 which was also well tolerated.    On day of discharge, pt was hemodynamically stable with positive GI function, pain well controlled, tolerating diet, ambulating, voiding appropriately. He is expected to follow up with Dr. Fox in office in 1-2 weeks following discharge.

## 2019-06-09 NOTE — DISCHARGE NOTE NURSING/CASE MANAGEMENT/SOCIAL WORK - NSDCPNDISPN_GEN_ALL_CORE
Education provided on the pain management plan of care/Opioids not applicable/not prescribed/Activities of daily living, including home environment that might     exacerbate pain or reduce effectiveness of the pain management plan of care as well as strategies to address these issues/Side effects of pain management treatment/Safe use, storage and disposal of opioids when prescribed

## 2019-06-09 NOTE — PHYSICAL THERAPY INITIAL EVALUATION ADULT - ADDITIONAL COMMENTS
Pt lives in a house with his wife with +GEREMIAS. Prior to hospital admission pt reports independent in all ADLs. Pt wishes to return to home and perform exercises and use stationary bike to improve strength and balance.  Pt left comfortable in bed, NAD, all lines intact, all precautions maintained, with call bell in reach, family @bedside, and RN aware of PT evaluation.

## 2019-06-09 NOTE — PHYSICAL THERAPY INITIAL EVALUATION ADULT - DIAGNOSIS, PT EVAL
Pt presents with adhesive SBO; Pt presents with decreased balance and decreased strength for transfers and ambulation

## 2019-06-09 NOTE — DISCHARGE NOTE NURSING/CASE MANAGEMENT/SOCIAL WORK - NSDCDPATPORTLINK_GEN_ALL_CORE
You can access the [facility] Patient Portal, offered by [facility], by registering with the following website: [email or web address]

## 2019-06-09 NOTE — DISCHARGE NOTE PROVIDER - NSDCCPCAREPLAN_GEN_ALL_CORE_FT
PRINCIPAL DISCHARGE DIAGNOSIS  Diagnosis: SBO (small bowel obstruction)  Assessment and Plan of Treatment: medical management

## 2019-06-09 NOTE — PHYSICAL THERAPY INITIAL EVALUATION ADULT - PATIENT PROFILE REVIEW, REHAB EVAL
ACTIVITY: Ambulate as Tolerated; spoke with RN prior to PT evaluation--> Pt OK for PT consult/OOB activity/yes

## 2019-10-05 ENCOUNTER — INPATIENT (INPATIENT)
Facility: HOSPITAL | Age: 84
LOS: 2 days | Discharge: HOME | End: 2019-10-08
Attending: INTERNAL MEDICINE | Admitting: INTERNAL MEDICINE
Payer: MEDICARE

## 2019-10-05 VITALS
SYSTOLIC BLOOD PRESSURE: 118 MMHG | RESPIRATION RATE: 18 BRPM | DIASTOLIC BLOOD PRESSURE: 76 MMHG | TEMPERATURE: 99 F | HEART RATE: 106 BPM | OXYGEN SATURATION: 98 %

## 2019-10-05 DIAGNOSIS — E87.1 HYPO-OSMOLALITY AND HYPONATREMIA: ICD-10-CM

## 2019-10-05 DIAGNOSIS — K27.9 PEPTIC ULCER, SITE UNSPECIFIED, UNSPECIFIED AS ACUTE OR CHRONIC, WITHOUT HEMORRHAGE OR PERFORATION: ICD-10-CM

## 2019-10-05 DIAGNOSIS — Z29.9 ENCOUNTER FOR PROPHYLACTIC MEASURES, UNSPECIFIED: ICD-10-CM

## 2019-10-05 DIAGNOSIS — K52.9 NONINFECTIVE GASTROENTERITIS AND COLITIS, UNSPECIFIED: ICD-10-CM

## 2019-10-05 DIAGNOSIS — Z98.890 OTHER SPECIFIED POSTPROCEDURAL STATES: Chronic | ICD-10-CM

## 2019-10-05 DIAGNOSIS — M06.9 RHEUMATOID ARTHRITIS, UNSPECIFIED: ICD-10-CM

## 2019-10-05 DIAGNOSIS — I10 ESSENTIAL (PRIMARY) HYPERTENSION: ICD-10-CM

## 2019-10-05 DIAGNOSIS — J84.10 PULMONARY FIBROSIS, UNSPECIFIED: ICD-10-CM

## 2019-10-05 DIAGNOSIS — A41.9 SEPSIS, UNSPECIFIED ORGANISM: ICD-10-CM

## 2019-10-05 LAB
ALBUMIN SERPL ELPH-MCNC: 3.2 G/DL — LOW (ref 3.3–5)
ALP SERPL-CCNC: 76 U/L — SIGNIFICANT CHANGE UP (ref 40–120)
ALT FLD-CCNC: 10 U/L — SIGNIFICANT CHANGE UP (ref 4–41)
ANION GAP SERPL CALC-SCNC: 15 MMO/L — HIGH (ref 7–14)
APTT BLD: 31.7 SEC — SIGNIFICANT CHANGE UP (ref 27.5–36.3)
AST SERPL-CCNC: 21 U/L — SIGNIFICANT CHANGE UP (ref 4–40)
BASE EXCESS BLDV CALC-SCNC: 2.8 MMOL/L — SIGNIFICANT CHANGE UP
BASOPHILS # BLD AUTO: 0.02 K/UL — SIGNIFICANT CHANGE UP (ref 0–0.2)
BASOPHILS NFR BLD AUTO: 0.1 % — SIGNIFICANT CHANGE UP (ref 0–2)
BILIRUB SERPL-MCNC: 0.3 MG/DL — SIGNIFICANT CHANGE UP (ref 0.2–1.2)
BLD GP AB SCN SERPL QL: NEGATIVE — SIGNIFICANT CHANGE UP
BLOOD GAS VENOUS - CREATININE: 0.8 MG/DL — SIGNIFICANT CHANGE UP (ref 0.5–1.3)
BUN SERPL-MCNC: 20 MG/DL — SIGNIFICANT CHANGE UP (ref 7–23)
CALCIUM SERPL-MCNC: 9.6 MG/DL — SIGNIFICANT CHANGE UP (ref 8.4–10.5)
CHLORIDE BLDV-SCNC: 95 MMOL/L — LOW (ref 96–108)
CHLORIDE SERPL-SCNC: 89 MMOL/L — LOW (ref 98–107)
CO2 SERPL-SCNC: 26 MMOL/L — SIGNIFICANT CHANGE UP (ref 22–31)
CREAT SERPL-MCNC: 0.85 MG/DL — SIGNIFICANT CHANGE UP (ref 0.5–1.3)
EOSINOPHIL # BLD AUTO: 0.01 K/UL — SIGNIFICANT CHANGE UP (ref 0–0.5)
EOSINOPHIL NFR BLD AUTO: 0.1 % — SIGNIFICANT CHANGE UP (ref 0–6)
GAS PNL BLDV: 126 MMOL/L — LOW (ref 136–146)
GLUCOSE BLDV-MCNC: 117 MG/DL — HIGH (ref 70–99)
GLUCOSE SERPL-MCNC: 111 MG/DL — HIGH (ref 70–99)
HCO3 BLDV-SCNC: 27 MMOL/L — SIGNIFICANT CHANGE UP (ref 20–27)
HCT VFR BLD CALC: 32.7 % — LOW (ref 39–50)
HCT VFR BLDV CALC: 30.6 % — LOW (ref 39–51)
HGB BLD-MCNC: 10.8 G/DL — LOW (ref 13–17)
HGB BLDV-MCNC: 9.9 G/DL — LOW (ref 13–17)
IMM GRANULOCYTES NFR BLD AUTO: 0.4 % — SIGNIFICANT CHANGE UP (ref 0–1.5)
INR BLD: 1.24 — HIGH (ref 0.88–1.17)
LACTATE BLDV-MCNC: 1.3 MMOL/L — SIGNIFICANT CHANGE UP (ref 0.5–2)
LYMPHOCYTES # BLD AUTO: 1.19 K/UL — SIGNIFICANT CHANGE UP (ref 1–3.3)
LYMPHOCYTES # BLD AUTO: 6.7 % — LOW (ref 13–44)
MCHC RBC-ENTMCNC: 29 PG — SIGNIFICANT CHANGE UP (ref 27–34)
MCHC RBC-ENTMCNC: 33 % — SIGNIFICANT CHANGE UP (ref 32–36)
MCV RBC AUTO: 87.7 FL — SIGNIFICANT CHANGE UP (ref 80–100)
MONOCYTES # BLD AUTO: 0.88 K/UL — SIGNIFICANT CHANGE UP (ref 0–0.9)
MONOCYTES NFR BLD AUTO: 4.9 % — SIGNIFICANT CHANGE UP (ref 2–14)
NEUTROPHILS # BLD AUTO: 15.64 K/UL — HIGH (ref 1.8–7.4)
NEUTROPHILS NFR BLD AUTO: 87.8 % — HIGH (ref 43–77)
NRBC # FLD: 0 K/UL — SIGNIFICANT CHANGE UP (ref 0–0)
PCO2 BLDV: 45 MMHG — SIGNIFICANT CHANGE UP (ref 41–51)
PH BLDV: 7.4 PH — SIGNIFICANT CHANGE UP (ref 7.32–7.43)
PLATELET # BLD AUTO: 487 K/UL — HIGH (ref 150–400)
PMV BLD: 9 FL — SIGNIFICANT CHANGE UP (ref 7–13)
PO2 BLDV: 56 MMHG — HIGH (ref 35–40)
POTASSIUM BLDV-SCNC: 3.6 MMOL/L — SIGNIFICANT CHANGE UP (ref 3.4–4.5)
POTASSIUM SERPL-MCNC: 4.6 MMOL/L — SIGNIFICANT CHANGE UP (ref 3.5–5.3)
POTASSIUM SERPL-SCNC: 4.6 MMOL/L — SIGNIFICANT CHANGE UP (ref 3.5–5.3)
PROT SERPL-MCNC: 7.7 G/DL — SIGNIFICANT CHANGE UP (ref 6–8.3)
PROTHROM AB SERPL-ACNC: 14.2 SEC — HIGH (ref 9.8–13.1)
RBC # BLD: 3.73 M/UL — LOW (ref 4.2–5.8)
RBC # FLD: 15.4 % — HIGH (ref 10.3–14.5)
RH IG SCN BLD-IMP: POSITIVE — SIGNIFICANT CHANGE UP
SAO2 % BLDV: 87.3 % — HIGH (ref 60–85)
SODIUM SERPL-SCNC: 130 MMOL/L — LOW (ref 135–145)
WBC # BLD: 17.81 K/UL — HIGH (ref 3.8–10.5)
WBC # FLD AUTO: 17.81 K/UL — HIGH (ref 3.8–10.5)

## 2019-10-05 PROCEDURE — 74177 CT ABD & PELVIS W/CONTRAST: CPT | Mod: 26

## 2019-10-05 PROCEDURE — 99222 1ST HOSP IP/OBS MODERATE 55: CPT

## 2019-10-05 RX ORDER — PANTOPRAZOLE SODIUM 20 MG/1
40 TABLET, DELAYED RELEASE ORAL EVERY 12 HOURS
Refills: 0 | Status: DISCONTINUED | OUTPATIENT
Start: 2019-10-05 | End: 2019-10-06

## 2019-10-05 RX ORDER — SODIUM CHLORIDE 9 MG/ML
1000 INJECTION INTRAMUSCULAR; INTRAVENOUS; SUBCUTANEOUS
Refills: 0 | Status: DISCONTINUED | OUTPATIENT
Start: 2019-10-05 | End: 2019-10-05

## 2019-10-05 RX ORDER — CHOLECALCIFEROL (VITAMIN D3) 125 MCG
1 CAPSULE ORAL
Qty: 0 | Refills: 0 | DISCHARGE

## 2019-10-05 RX ORDER — ONDANSETRON 8 MG/1
4 TABLET, FILM COATED ORAL ONCE
Refills: 0 | Status: COMPLETED | OUTPATIENT
Start: 2019-10-05 | End: 2019-10-05

## 2019-10-05 RX ORDER — SODIUM CHLORIDE 9 MG/ML
1000 INJECTION INTRAMUSCULAR; INTRAVENOUS; SUBCUTANEOUS ONCE
Refills: 0 | Status: COMPLETED | OUTPATIENT
Start: 2019-10-05 | End: 2019-10-05

## 2019-10-05 RX ORDER — FUROSEMIDE 40 MG
1 TABLET ORAL
Qty: 0 | Refills: 0 | DISCHARGE

## 2019-10-05 RX ORDER — METRONIDAZOLE 500 MG
500 TABLET ORAL ONCE
Refills: 0 | Status: COMPLETED | OUTPATIENT
Start: 2019-10-05 | End: 2019-10-05

## 2019-10-05 RX ORDER — HYDROXYCHLOROQUINE SULFATE 200 MG
200 TABLET ORAL
Refills: 0 | Status: DISCONTINUED | OUTPATIENT
Start: 2019-10-05 | End: 2019-10-06

## 2019-10-05 RX ORDER — CIPROFLOXACIN LACTATE 400MG/40ML
400 VIAL (ML) INTRAVENOUS ONCE
Refills: 0 | Status: COMPLETED | OUTPATIENT
Start: 2019-10-05 | End: 2019-10-05

## 2019-10-05 RX ADMIN — SODIUM CHLORIDE 1000 MILLILITER(S): 9 INJECTION INTRAMUSCULAR; INTRAVENOUS; SUBCUTANEOUS at 16:39

## 2019-10-05 RX ADMIN — ONDANSETRON 4 MILLIGRAM(S): 8 TABLET, FILM COATED ORAL at 14:50

## 2019-10-05 RX ADMIN — Medication 200 MILLIGRAM(S): at 17:54

## 2019-10-05 RX ADMIN — Medication 500 MILLIGRAM(S): at 22:26

## 2019-10-05 RX ADMIN — PANTOPRAZOLE SODIUM 40 MILLIGRAM(S): 20 TABLET, DELAYED RELEASE ORAL at 23:47

## 2019-10-05 RX ADMIN — Medication 100 MILLIGRAM(S): at 19:25

## 2019-10-05 RX ADMIN — SODIUM CHLORIDE 100 MILLILITER(S): 9 INJECTION INTRAMUSCULAR; INTRAVENOUS; SUBCUTANEOUS at 14:50

## 2019-10-05 NOTE — H&P ADULT - PROBLEM SELECTOR PLAN 1
- -1 day hx of nausea, vomiting, and diffuse abdominal pain.  -CTAP showing moderate small bowel dilatation likely related to enteritis   - -1 day hx of nausea, vomiting, and diffuse abdominal pain.  -CTAP showing moderate small bowel dilatation likely related to enteritis   -c/w cipro and metronidazole IV. Switch to PO tomorrow. Treat for 5 days. -1 day hx of nausea, vomiting, and diffuse abdominal pain.  -CTAP showing moderate small bowel dilatation likely related to enteritis   -c/w cipro and metronidazole IV. Switch to PO tomorrow. Treat for 5 days.  -Will send GI PCR, stool culture, Ova and parasite. -1 day hx of nausea, vomiting, and diffuse abdominal pain.  -CTAP showing moderate small bowel dilatation likely related to enteritis   -c/w cipro and metronidazole IV. Switch to PO tomorrow. Treat for 5 days.  -Will send GI PCR, stool culture, Ova and parasite given pt is immunocompromised   -n/v now resolved. Caution against anti-emetics in the setting of prolonged Qtc. Will replete mag, follow up repeat EKG in AM  -clears diet, advance as tolerated

## 2019-10-05 NOTE — ED PROVIDER NOTE - OBJECTIVE STATEMENT
Twin NICHOLAS MD PGY2: 85M w/ hx of laparoscopic R hemicolectomy for colon ca 10 years ago in [place] w/ several SBOs since then that have resolved nonoperatively here for 2d of nausea and vomiting with difficulty tolerating PO and reduced appetite similar to prior presentations. Began with one episode of NBNB emesis yesterday after dinner. Today had one episode of bilious emesis. Now has been having decreased Twin NICHOLAS MD PGY2: 85M w/ hx of laparoscopic R hemicolectomy for colon ca 10 years ago in [place] w/ several SBOs since then that have resolved nonoperatively here for 2d of nausea and vomiting with difficulty tolerating PO and reduced appetite similar to prior presentations. Began with one episode of NBNB emesis yesterday after dinner. Today had one episode of bilious emesis. Now has been having decreased appetitie and decreased Po intake including fluids. Last BM yesterday.

## 2019-10-05 NOTE — CONSULT NOTE ADULT - ASSESSMENT
ASSESSMENT: Patient is a 86M with enteritis. No sign of clinical obstruction at this time.     PLAN:   - Antibiotics  - IVF hydration  - Advance diet as tolerated.   - No acute surgical intervention  - Discussed with attending Dr. Potter

## 2019-10-05 NOTE — CONSULT NOTE ADULT - ATTENDING COMMENTS
Mr. Harris has been seen and examined. I agree with the above. His abdominal examination is benign. He has no obstructive symptoms. He denies abdominal pain. I suspect that a bowel obstruction is very unlikely.

## 2019-10-05 NOTE — ED ADULT NURSE NOTE - OBJECTIVE STATEMENT
Receive pt. in room 25 alert and oriented x 4 presenting to the ER with complaints of vomiting . Pt. have  a history of PVD,, HTN, Douglas's oesophagus. Pt. stated " I started vomiting yesterday and my appetite is not good". Pt. c/o nausea medicated as ordered labs sent will continue to monitor.

## 2019-10-05 NOTE — CONSULT NOTE ADULT - SUBJECTIVE AND OBJECTIVE BOX
GENERAL SURGERY CONSULT NOTE  --------------------------------------------------------------------------------------------    Patient is a 86y old  Male who presents with a chief complaint of nausea/abdominal pain.     HPI: 86M with PMH Rheumatoid arthritis and colon cancer s/p laparoscopic colon resection, chemo/XRT who presented to ER with 1 day of generalized abdominal pain. Pt reports he had 1 episode of loose BM yesterday. No BRBPR or melena. Pt has had some nausea, but no emesis. His daughter said he ate a large amount of the veggie stick chips a few days ago, but nothing else out of the ordinary. +flatus today. No lightheadedness, dizziness, SOB, chest pain, constipation, fever, chills. Pt was up to date with his colonoscopies, but no longer requires them since he is 86.      ROS: 10-system review is otherwise negative except HPI above.      PAST MEDICAL & SURGICAL HISTORY:  PVD (peripheral vascular disease)  Douglas esophagus  HTN (hypertension)  H/O inguinal hernia repair: left  H/O right hemicolectomy +chemo/radiation for colon cancer    FAMILY HISTORY:  No pertinent family history in first degree relatives. No history of cancer in family.   [x] Family history not pertinent as reviewed with the patient and family    SOCIAL HISTORY: smoker 2 ppd x15 years, quit 50 years ago. no drug use. no alcohol. 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     ALLERGIES: No Known Allergies    HOME MEDICATIONS:   esomeprazole  hydroxychloroquine  B complex vitamin  D3 vitamin  iron supplement  --------------------------------------------------------------------------------------------    Vitals:   T(C): 36.8 (10-05-19 @ 15:46), Max: 37.1 (10-05-19 @ 14:30)  HR: 102 (10-05-19 @ 15:46) (102 - 106)  BP: 121/86 (10-05-19 @ 15:46) (118/76 - 121/86)  RR: 18 (10-05-19 @ 15:46) (18 - 18)  SpO2: 97% (10-05-19 @ 15:46) (97% - 98%)  CAPILLARY BLOOD GLUCOSE    10-05 @ 07:01  -  10-05 @ 20:52  --------------------------------------------------------  IN:  Total IN: 0 mL    OUT:    Voided: 400 mL  Total OUT: 400 mL    Total NET: -400 mL    PHYSICAL EXAM:   General: NAD, Lying in bed comfortably  Neuro: A+Ox3  HEENT: NC/AT, EOMI  Neck: Soft, supple  Cardio: irregularly irregular rhythm, nml S1/S2  Resp: Good effort, CTA b/l  GI/Abd: Soft, non-distended, non-tender, no rebound/guarding, no masses palpated  Vascular: All 4 extremities warm.  Skin: Intact, no breakdown  Lymphatic/Nodes: No palpable lymphadenopathy  Musculoskeletal: All 4 extremities moving spontaneously, no limitations  --------------------------------------------------------------------------------------------    LABS  CBC (10-05 @ 14:55)                              10.8<L>                         17.81<H>  )----------------(  487<H>     87.8<H>% Neutrophils, 6.7<L>% Lymphocytes, ANC: 15.64<H>                              32.7<L>    BMP (10-05 @ 15:19)             130<L>  |  89<L>   |  20    		Ca++ --      Ca 9.6                ---------------------------------( 111<H>		Mg --                 4.6     |  26      |  0.85  			Ph --        LFTs (10-05 @ 15:19)      TPro 7.7 / Alb 3.2<L> / TBili 0.3 / DBili -- / AST 21 / ALT 10 / AlkPhos 76    Coags (10-05 @ 14:55)  aPTT 31.7 / INR 1.24<H> / PT 14.2<H>        VBG (10-05 @ 19:25)     7.40 / 45 / 56<H> / 27 / 2.8 / 87.3<H>%     Lactate: 1.3    --------------------------------------------------------------------------------------------    MICROBIOLOGY      --------------------------------------------------------------------------------------------    IMAGING  IMPRESSION:     Moderate small bowel dilatation likely related to enteritis with   differential diagnosis of incomplete or early small bowel obstruction.   Suggest follow-up imaging to track passage of contrast.  Extensive pulmonary fibrosis  CT scan:

## 2019-10-05 NOTE — ED PROVIDER NOTE - ATTENDING CONTRIBUTION TO CARE
Dr. Dillon: I have personally performed a face to face bedside history and physical examination of this patient. I have discussed the history, examination, review of systems, assessment and plan of management with the resident. I have reviewed the electronic medical record and amended it to reflect my history, review of systems, physical exam, assessment and plan.    86M with pmh of colon cancer, s/p hemicolectomy, SBO,  p/w abdominal paini. Dr. Dillon: I have personally performed a face to face bedside history and physical examination of this patient. I have discussed the history, examination, review of systems, assessment and plan of management with the resident. I have reviewed the electronic medical record and amended it to reflect my history, review of systems, physical exam, assessment and plan.    86M with pmh of colon cancer, s/p hemicolectomy, recent partial SBO,  p/w abdominal pain and n/v. Feels like prior SBO.     on exam uncomfortable  afebrile  present BS in all 4 quadrants  no pulsatile mass. Mild diffuse tenderness. No guarding.     86F with pmh of abdominal surgeries, SBO, p/w abdominal pain and vomiting. Will r/o SBO, colitis, enteritis, pancreatitis. Less likely mesenteric ischemia. Low suspicion for ACS given no chest pain. Plan for labs, pain control, prn antiemetics, CT a/p.

## 2019-10-05 NOTE — H&P ADULT - HISTORY OF PRESENT ILLNESS
85 year old male with PMH of colon cancer (10+ years ago, s/p laparoscopic colon resection, chemo/XR c/b multiple SBOs), RA (on hydroxychloroquine), HTN, barretts esophagus, PVD, and arthritis 85 year old male with PMH of colon cancer (10+ years ago, s/p laparoscopic colon resection, chemo/XR) recurrent small bowel obstructions (most recent 06/19) RA (on hydroxychloroquine), pulm fibrosis, PUD (GIB 11/18), HTN, barretts esophagus, PVD, and arthritis who presents with 1 day hx of nausea, vomiting, and diffuse abdominal pain. the patient reports these symptoms started abruptly yesterday. I spoke to his daughter who told me the patient had eaten a large amount of fried veggie sticks yesterday which is not usual diet for him. Aside from that, denies sick contacts or eating other food out of the ordinary. Patient had 1 episode of diarrhea. Vomit was NBNB. Denies fevers, chills, recent weightloss. Abd pain was diffuse and felt similar to constipation patient / having to have a bowel movement.     In the ED vital signs: 98.7, 106, 118/76, 18, 100. He received cipro, metro, 1L .9NS IVF, and odansetron in ED. 86 year old male with PMH of colon cancer (10+ years ago, s/p laparoscopic colon resection, chemo/XR) recurrent small bowel obstructions (most recent 06/19) RA (on hydroxychloroquine), pulm fibrosis, PUD (GIB 11/18), HTN, barretts esophagus, PVD, and arthritis who presents with 1 day hx of nausea, vomiting, and diffuse abdominal pain. the patient reports these symptoms started abruptly yesterday. I spoke to his daughter who told me the patient had eaten a large amount of fried veggie sticks yesterday which is not usual diet for him. Aside from that, denies sick contacts or eating other food out of the ordinary. Patient had 1 episode of diarrhea. Vomit was NBNB. Denies fevers, chills, recent weightloss. Abd pain was diffuse and felt similar to constipation patient / having to have a bowel movement.     In the ED vital signs: 98.7, 106, 118/76, 18, 100. He received cipro, metro, 1L .9NS IVF, and odansetron in ED. 86 year old male with PMH of colon cancer (10+ years ago, s/p laparoscopic colon resection, chemo/XR) recurrent small bowel obstructions (most recent 06/19) RA (on hydroxychloroquine), pulm fibrosis, PUD (GIB 11/18), HTN, barretts esophagus, PVD, and arthritis who presents with 1 day hx of nausea, vomiting, and diffuse abdominal pain. the patient reports these symptoms started abruptly yesterday. I spoke to his daughter who told me the patient had eaten a large amount of fried veggie sticks yesterday which is not usual diet for him. Aside from that, denies sick contacts or eating other food out of the ordinary. Patient had 1 episode of diarrhea. Vomit was NBNB. Denies fevers, chills, recent weightloss. Abd pain was diffuse and felt similar to constipation patient / having to have a bowel movement. Currently denies abdominal pain, nausea or vomiting. Passing flatus intermittently     In the ED vital signs: 98.7, 106, 118/76, 18, 100. He received cipro, metro, 1L .9NS IVF, and odansetron in ED.

## 2019-10-05 NOTE — ED PROVIDER NOTE - PROGRESS NOTE DETAILS
pt seen and examined by me, wife and daughter at bedside, hx of distant colon ca, now with nausea/vomiting, CT with ?SBO, evaluated by surgery, no intervention at this time-believed to be more of a colitis picture, abx given, will admit to medicine for monitoring and IV abx  Iza Welsh, PGY-3 EM

## 2019-10-05 NOTE — H&P ADULT - PROBLEM SELECTOR PLAN 6
-Hold home anti-HTN meds in setting of sepsis  -Clarify med rec this AM -PExam with diffuse crackles bilaterally. Imaging showing extensive pulm fibrosis  -Will contact patients rheumatologist to discuss this finding and to assess whether or not patient has PFTs, TTE to eval for PHT etc. -PExam with diffuse crackles bilaterally. Imaging showing extensive pulm fibrosis  -Will contact patients rheumatologist to discuss this finding and to assess whether or not patient has PFTs, TTE to eval for PHT etc.  -will order TTE here to assess for pulm HTN, cor pulmonale

## 2019-10-05 NOTE — H&P ADULT - NSHPREVIEWOFSYSTEMS_GEN_ALL_CORE
Constitutional: denies fevers, chills, night sweats, weight loss  HEENT: denies visual changes, cough  Cardiovascular: denies palpitations, chest pain, edema  Respiratory: denies SOB, wheezing  Gastrointestinal: d+ N/V/D, abdominal pain No hematochezia, melena  : denies dysuria, urinary urgency, increased frequency  MSK: denies muscle weakness, joint pain  Skin: denies new rashes or masses  Heme: denies bleeding, bruising  Neuro: denies headache, weakness Constitutional: denies fevers, chills, night sweats, weight loss  HEENT: denies visual changes, cough  Cardiovascular: denies palpitations, chest pain, edema  Respiratory: denies SOB, wheezing  Gastrointestinal: d+Abdominal pain+ N/V/D, abdominal pain No hematochezia, melena  : denies dysuria, urinary urgency, increased frequency  MSK: denies muscle weakness, joint pain  Skin: denies new rashes or masses  Heme: denies bleeding, bruising  Neuro: denies headache, weakness

## 2019-10-05 NOTE — H&P ADULT - PROBLEM SELECTOR PLAN 9
-Diet regular   -DVT prophylaxis with HSQ  -PT eval -Diet clears, advance as tolerated   -DVT prophylaxis with HSQ  -PT eval

## 2019-10-05 NOTE — H&P ADULT - ASSESSMENT
85 year old male with PMH of colon cancer (10+ years ago, s/p laparoscopic colon resection, chemo/XR) recurrent small bowel obstructions (most recent 06/19) RA (on hydroxychloroquine), pulm fibrosis, PUD (GIB 11/18), HTN, barretts esophagus, PVD, and arthritis who presents with 1 day hx of nausea, vomiting, and diffuse abdominal pain found to have sepsis 2/2 enteritis seen on CT 86 year old male with PMH of colon cancer (10+ years ago, s/p laparoscopic colon resection, chemo/XR) recurrent small bowel obstructions (most recent 06/19) RA (on hydroxychloroquine), pulm fibrosis, PUD (GIB 11/18), HTN, barretts esophagus, PVD, and arthritis who presents with 1 day hx of nausea, vomiting, and diffuse abdominal pain found to have sepsis 2/2 enteritis seen on CT

## 2019-10-05 NOTE — H&P ADULT - NSHPSOCIALHISTORY_GEN_ALL_CORE
Lives at home with wife. Children check him on regularly.   Smoked 2 PPD for 15 years quit 50+ years ago  No etoh use  Used to own a restaurant business  Uses cane indoors and walker when he goes out

## 2019-10-05 NOTE — ED PROVIDER NOTE - PHYSICAL EXAMINATION
Twin NICHOLAS MD PGY2:   PHYSICAL EXAM:    GENERAL: NAD, well-developed  HEENT:  Atraumatic, Normocephalic  CHEST/LUNG: Chest rise equal bilaterally  HEART: Regular rate and rhythm  ABDOMEN: Abomen soft Nt/ND + BS.   EXTREMITIES:  2+ Peripheral Pulses.  PSYCH: A&Ox3  SKIN: No obvious rashes or lesions

## 2019-10-05 NOTE — H&P ADULT - PROBLEM SELECTOR PLAN 5
-PExam with diffuse crackles bilaterally. Imaging showing extensive pulm fibrosis  -Will contact patients rheumatologist to discuss this finding and to assess whether or not patient has PFTs, TTE to eval for PHT etc. -C/w home  BID with lunch and dinner

## 2019-10-05 NOTE — ED ADULT NURSE NOTE - NSIMPLEMENTINTERV_GEN_ALL_ED
Implemented All Fall Risk Interventions:  [place] to call system. Call bell, personal items and telephone within reach. Instruct patient to call for assistance. Room bathroom lighting operational. Non-slip footwear when patient is off stretcher. Physically safe environment: no spills, clutter or unnecessary equipment. Stretcher in lowest position, wheels locked, appropriate side rails in place. Provide visual cue, wrist band, yellow gown, etc. Monitor gait and stability. Monitor for mental status changes and reorient to person, place, and time. Review medications for side effects contributing to fall risk. Reinforce activity limits and safety measures with patient and family.

## 2019-10-05 NOTE — ED PROVIDER NOTE - CLINICAL SUMMARY MEDICAL DECISION MAKING FREE TEXT BOX
Twin NICHOLAS MD PGY2: 85M w/ hx of laparoscopic R hemicolectomy for colon ca 10 years ago in [place] w/ several SBOs here with concern for recurrence of SBO. WIll obtain CTAP and preoperative labs and treat N/V and give maintenance fluids.

## 2019-10-05 NOTE — H&P ADULT - PROBLEM SELECTOR PLAN 7
-On PPI BID at home, will start pantoprazole 40 BID -Hold home anti-HTN meds in setting of sepsis  -Clarify med rec this AM

## 2019-10-05 NOTE — H&P ADULT - NSHPLABSRESULTS_GEN_ALL_CORE
CBC Full  -  ( 05 Oct 2019 14:55 )  WBC Count : 17.81 K/uL  RBC Count : 3.73 M/uL  Hemoglobin : 10.8 g/dL  Hematocrit : 32.7 %  Platelet Count - Automated : 487 K/uL  Mean Cell Volume : 87.7 fL  Mean Cell Hemoglobin : 29.0 pg  Mean Cell Hemoglobin Concentration : 33.0 %  Auto Neutrophil # : 15.64 K/uL  Auto Lymphocyte # : 1.19 K/uL  Auto Monocyte # : 0.88 K/uL  Auto Eosinophil # : 0.01 K/uL  Auto Basophil # : 0.02 K/uL  Auto Neutrophil % : 87.8 %  Auto Lymphocyte % : 6.7 %  Auto Monocyte % : 4.9 %  Auto Eosinophil % : 0.1 %  Auto Basophil % : 0.1 %    10-05    130<L>  |  89<L>  |  20  ----------------------------<  111<H>  4.6   |  26  |  0.85    Ca    9.6      05 Oct 2019 15:19    LIVER FUNCTIONS - ( 05 Oct 2019 15:19 )  Alb: 3.2 g/dL / Pro: 7.7 g/dL / ALK PHOS: 76 u/L / ALT: 10 u/L / AST: 21 u/L / GGT: x           19:25 - VBG - pH: 7.40  | pCO2: 45    | pO2: 56    | Lactate: 1.3      EKG reviewed by me:  Rate: 102  Rhythm: sinus tachycardia  Axis: LAD  MN: 140  QRS: 126, RBBB  QTC: 518  No Rylan/STd/TWI in two or more continguous leads    CTAP:  IMPRESSION:     Moderate small bowel dilatation likely related to enteritis with   differential diagnosis of incomplete or early small bowel obstruction.   Suggest follow-up imaging to track passage of contrast.  Extensive pulmonary fibrosis

## 2019-10-05 NOTE — H&P ADULT - PROBLEM SELECTOR PLAN 4
-C/w home  BID with lunch and dinner -Na 130 in setting of decreased PO intake and hx of nausea, abdominal pain  -Likely euvolemic vs hypovolemic  -will check AM BMP s/p 1L of fluid to reassess Na

## 2019-10-05 NOTE — H&P ADULT - PROBLEM SELECTOR PLAN 2
-Tachycardic, elevated WBC, enteritis on CT  -Abx as above. Blood cultures, UCx sent. -Tachycardic, elevated WBC, enteritis on CT  -Abx as above. Blood cultures, UCx sent.  -remains tachycardic. Will do LR 75 cc / hr for 10 hours and avoid aggressive IVF rescusc given extensive pulm fibrosis and high likelihood of pulm HTN and possible cor pulmonale.

## 2019-10-05 NOTE — H&P ADULT - NSICDXPASTMEDICALHX_GEN_ALL_CORE_FT
PAST MEDICAL HISTORY:  Douglas esophagus     HTN (hypertension)     PVD (peripheral vascular disease)     Rheumatoid arthritis

## 2019-10-05 NOTE — H&P ADULT - PROBLEM SELECTOR PLAN 3
-Na 130 in setting of decreased PO intake and hx of nausea, abdominal pain  -Likely euvolemic vs hypovolemic  -will check AM BMP s/p 1L of fluid to reassess Na -CTAP showed Moderate small bowel dilatation likely related to enteritis with   differential diagnosis of incomplete or early small bowel obstruction.   -Surg evaluated patient, does not think obstruction. Pt passing gas and having BMs  -will consider follow-up imaging to track passage of contrast this AM -CTAP showed Moderate small bowel dilatation likely related to enteritis with   differential diagnosis of incomplete or early small bowel obstruction.   -Surg evaluated patient, does not think obstruction. Pt passing gas and having BMs  -will consider follow-up imaging to track passage of contrast this AM  -serial abdominal exams  -clear liquid diet, advance as tolerated

## 2019-10-05 NOTE — H&P ADULT - NSHPPHYSICALEXAM_GEN_ALL_CORE
PHYSICAL EXAM:   GEN: Age appropriate, resting comfortably in bed, no acute distress, non toxic appearing, speaking in complete sentences.   HEENT: Conjunctiva and sclera normal  PULM: crackles bilaterally, louder at the bases  CV: RRR, S1S2, no MRG  MSK: no stiffness or joint effusions  Abdominal: Soft, nontender to palpation, non-distended, +BS  Extremities: No edema or cyanosis  NEURO: AAOx3  Psych: normal affect, normal behavior  Skin: No rashes, lesions    T(C): 36.3 (10-05-19 @ 23:03), Max: 37.1 (10-05-19 @ 14:30)  HR: 107 (10-05-19 @ 23:03) (82 - 107)  BP: 127/87 (10-05-19 @ 23:03) (105/69 - 127/87)  RR: 17 (10-05-19 @ 23:03) (17 - 18)  SpO2: 95% (10-05-19 @ 23:03) (95% - 100%)

## 2019-10-06 LAB
ALBUMIN SERPL ELPH-MCNC: 2.5 G/DL — LOW (ref 3.3–5)
ALP SERPL-CCNC: 64 U/L — SIGNIFICANT CHANGE UP (ref 40–120)
ALT FLD-CCNC: 8 U/L — SIGNIFICANT CHANGE UP (ref 4–41)
ANION GAP SERPL CALC-SCNC: 10 MMO/L — SIGNIFICANT CHANGE UP (ref 7–14)
AST SERPL-CCNC: 17 U/L — SIGNIFICANT CHANGE UP (ref 4–40)
BASOPHILS # BLD AUTO: 0.04 K/UL — SIGNIFICANT CHANGE UP (ref 0–0.2)
BASOPHILS NFR BLD AUTO: 0.4 % — SIGNIFICANT CHANGE UP (ref 0–2)
BILIRUB SERPL-MCNC: 0.2 MG/DL — SIGNIFICANT CHANGE UP (ref 0.2–1.2)
BUN SERPL-MCNC: 15 MG/DL — SIGNIFICANT CHANGE UP (ref 7–23)
CALCIUM SERPL-MCNC: 8.8 MG/DL — SIGNIFICANT CHANGE UP (ref 8.4–10.5)
CHLORIDE SERPL-SCNC: 95 MMOL/L — LOW (ref 98–107)
CO2 SERPL-SCNC: 25 MMOL/L — SIGNIFICANT CHANGE UP (ref 22–31)
CREAT SERPL-MCNC: 0.79 MG/DL — SIGNIFICANT CHANGE UP (ref 0.5–1.3)
EOSINOPHIL # BLD AUTO: 0.15 K/UL — SIGNIFICANT CHANGE UP (ref 0–0.5)
EOSINOPHIL NFR BLD AUTO: 1.4 % — SIGNIFICANT CHANGE UP (ref 0–6)
GLUCOSE SERPL-MCNC: 81 MG/DL — SIGNIFICANT CHANGE UP (ref 70–99)
HCT VFR BLD CALC: 27.5 % — LOW (ref 39–50)
HGB BLD-MCNC: 9.1 G/DL — LOW (ref 13–17)
IMM GRANULOCYTES NFR BLD AUTO: 0.5 % — SIGNIFICANT CHANGE UP (ref 0–1.5)
LYMPHOCYTES # BLD AUTO: 1.23 K/UL — SIGNIFICANT CHANGE UP (ref 1–3.3)
LYMPHOCYTES # BLD AUTO: 11.7 % — LOW (ref 13–44)
MAGNESIUM SERPL-MCNC: 1.5 MG/DL — LOW (ref 1.6–2.6)
MAGNESIUM SERPL-MCNC: 1.7 MG/DL — SIGNIFICANT CHANGE UP (ref 1.6–2.6)
MCHC RBC-ENTMCNC: 28.7 PG — SIGNIFICANT CHANGE UP (ref 27–34)
MCHC RBC-ENTMCNC: 33.1 % — SIGNIFICANT CHANGE UP (ref 32–36)
MCV RBC AUTO: 86.8 FL — SIGNIFICANT CHANGE UP (ref 80–100)
MONOCYTES # BLD AUTO: 0.78 K/UL — SIGNIFICANT CHANGE UP (ref 0–0.9)
MONOCYTES NFR BLD AUTO: 7.4 % — SIGNIFICANT CHANGE UP (ref 2–14)
NEUTROPHILS # BLD AUTO: 8.3 K/UL — HIGH (ref 1.8–7.4)
NEUTROPHILS NFR BLD AUTO: 78.6 % — HIGH (ref 43–77)
NRBC # FLD: 0 K/UL — SIGNIFICANT CHANGE UP (ref 0–0)
PHOSPHATE SERPL-MCNC: 2.6 MG/DL — SIGNIFICANT CHANGE UP (ref 2.5–4.5)
PHOSPHATE SERPL-MCNC: 3.8 MG/DL — SIGNIFICANT CHANGE UP (ref 2.5–4.5)
PLATELET # BLD AUTO: 405 K/UL — HIGH (ref 150–400)
PMV BLD: 8.8 FL — SIGNIFICANT CHANGE UP (ref 7–13)
POTASSIUM SERPL-MCNC: 3.5 MMOL/L — SIGNIFICANT CHANGE UP (ref 3.5–5.3)
POTASSIUM SERPL-SCNC: 3.5 MMOL/L — SIGNIFICANT CHANGE UP (ref 3.5–5.3)
PROT SERPL-MCNC: 6.1 G/DL — SIGNIFICANT CHANGE UP (ref 6–8.3)
RBC # BLD: 3.17 M/UL — LOW (ref 4.2–5.8)
RBC # FLD: 15.4 % — HIGH (ref 10.3–14.5)
SODIUM SERPL-SCNC: 130 MMOL/L — LOW (ref 135–145)
WBC # BLD: 10.55 K/UL — HIGH (ref 3.8–10.5)
WBC # FLD AUTO: 10.55 K/UL — HIGH (ref 3.8–10.5)

## 2019-10-06 PROCEDURE — 93010 ELECTROCARDIOGRAM REPORT: CPT

## 2019-10-06 PROCEDURE — 99223 1ST HOSP IP/OBS HIGH 75: CPT | Mod: GC

## 2019-10-06 RX ORDER — CARVEDILOL PHOSPHATE 80 MG/1
1 CAPSULE, EXTENDED RELEASE ORAL
Qty: 0 | Refills: 0 | DISCHARGE

## 2019-10-06 RX ORDER — PANTOPRAZOLE SODIUM 20 MG/1
40 TABLET, DELAYED RELEASE ORAL
Refills: 0 | Status: DISCONTINUED | OUTPATIENT
Start: 2019-10-06 | End: 2019-10-08

## 2019-10-06 RX ORDER — MAGNESIUM SULFATE 500 MG/ML
1 VIAL (ML) INJECTION ONCE
Refills: 0 | Status: COMPLETED | OUTPATIENT
Start: 2019-10-06 | End: 2019-10-06

## 2019-10-06 RX ORDER — CHOLECALCIFEROL (VITAMIN D3) 125 MCG
1000 CAPSULE ORAL DAILY
Refills: 0 | Status: DISCONTINUED | OUTPATIENT
Start: 2019-10-06 | End: 2019-10-08

## 2019-10-06 RX ORDER — PANTOPRAZOLE SODIUM 20 MG/1
40 TABLET, DELAYED RELEASE ORAL
Refills: 0 | Status: DISCONTINUED | OUTPATIENT
Start: 2019-10-06 | End: 2019-10-06

## 2019-10-06 RX ORDER — HYDROXYCHLOROQUINE SULFATE 200 MG
1 TABLET ORAL
Qty: 0 | Refills: 0 | DISCHARGE

## 2019-10-06 RX ORDER — PANTOPRAZOLE SODIUM 20 MG/1
40 TABLET, DELAYED RELEASE ORAL EVERY 12 HOURS
Refills: 0 | Status: DISCONTINUED | OUTPATIENT
Start: 2019-10-06 | End: 2019-10-06

## 2019-10-06 RX ORDER — CHOLECALCIFEROL (VITAMIN D3) 125 MCG
1 CAPSULE ORAL
Qty: 0 | Refills: 0 | DISCHARGE

## 2019-10-06 RX ORDER — ESOMEPRAZOLE MAGNESIUM 40 MG/1
1 CAPSULE, DELAYED RELEASE ORAL
Qty: 0 | Refills: 0 | DISCHARGE

## 2019-10-06 RX ORDER — CIPROFLOXACIN LACTATE 400MG/40ML
400 VIAL (ML) INTRAVENOUS EVERY 12 HOURS
Refills: 0 | Status: DISCONTINUED | OUTPATIENT
Start: 2019-10-06 | End: 2019-10-08

## 2019-10-06 RX ORDER — FUROSEMIDE 40 MG
1 TABLET ORAL
Qty: 0 | Refills: 0 | DISCHARGE

## 2019-10-06 RX ORDER — PANTOPRAZOLE SODIUM 20 MG/1
1 TABLET, DELAYED RELEASE ORAL
Qty: 0 | Refills: 0 | DISCHARGE

## 2019-10-06 RX ORDER — PANTOPRAZOLE SODIUM 20 MG/1
1 TABLET, DELAYED RELEASE ORAL
Qty: 30 | Refills: 0
Start: 2019-10-06 | End: 2019-11-04

## 2019-10-06 RX ORDER — SODIUM CHLORIDE 9 MG/ML
1000 INJECTION, SOLUTION INTRAVENOUS
Refills: 0 | Status: COMPLETED | OUTPATIENT
Start: 2019-10-06 | End: 2019-10-06

## 2019-10-06 RX ORDER — HYDROXYCHLOROQUINE SULFATE 200 MG
200 TABLET ORAL DAILY
Refills: 0 | Status: DISCONTINUED | OUTPATIENT
Start: 2019-10-06 | End: 2019-10-06

## 2019-10-06 RX ORDER — METRONIDAZOLE 500 MG
500 TABLET ORAL EVERY 8 HOURS
Refills: 0 | Status: DISCONTINUED | OUTPATIENT
Start: 2019-10-06 | End: 2019-10-08

## 2019-10-06 RX ORDER — HYDROXYCHLOROQUINE SULFATE 200 MG
200 TABLET ORAL
Refills: 0 | Status: DISCONTINUED | OUTPATIENT
Start: 2019-10-06 | End: 2019-10-08

## 2019-10-06 RX ORDER — INFLUENZA VIRUS VACCINE 15; 15; 15; 15 UG/.5ML; UG/.5ML; UG/.5ML; UG/.5ML
0.5 SUSPENSION INTRAMUSCULAR ONCE
Refills: 0 | Status: COMPLETED | OUTPATIENT
Start: 2019-10-06 | End: 2019-10-08

## 2019-10-06 RX ORDER — HEPARIN SODIUM 5000 [USP'U]/ML
5000 INJECTION INTRAVENOUS; SUBCUTANEOUS EVERY 8 HOURS
Refills: 0 | Status: DISCONTINUED | OUTPATIENT
Start: 2019-10-06 | End: 2019-10-08

## 2019-10-06 RX ADMIN — Medication 200 MILLIGRAM(S): at 18:51

## 2019-10-06 RX ADMIN — Medication 100 MILLIGRAM(S): at 11:57

## 2019-10-06 RX ADMIN — Medication 100 MILLIGRAM(S): at 20:45

## 2019-10-06 RX ADMIN — SODIUM CHLORIDE 75 MILLILITER(S): 9 INJECTION, SOLUTION INTRAVENOUS at 11:57

## 2019-10-06 RX ADMIN — SODIUM CHLORIDE 75 MILLILITER(S): 9 INJECTION, SOLUTION INTRAVENOUS at 01:05

## 2019-10-06 RX ADMIN — Medication 200 MILLIGRAM(S): at 18:48

## 2019-10-06 RX ADMIN — Medication 100 GRAM(S): at 02:28

## 2019-10-06 RX ADMIN — Medication 200 MILLIGRAM(S): at 05:44

## 2019-10-06 RX ADMIN — Medication 100 MILLIGRAM(S): at 02:04

## 2019-10-06 RX ADMIN — Medication 1000 UNIT(S): at 18:51

## 2019-10-06 RX ADMIN — HEPARIN SODIUM 5000 UNIT(S): 5000 INJECTION INTRAVENOUS; SUBCUTANEOUS at 21:02

## 2019-10-06 RX ADMIN — PANTOPRAZOLE SODIUM 40 MILLIGRAM(S): 20 TABLET, DELAYED RELEASE ORAL at 05:42

## 2019-10-06 RX ADMIN — Medication 200 MILLIGRAM(S): at 05:43

## 2019-10-06 NOTE — PROGRESS NOTE ADULT - PROBLEM SELECTOR PLAN 2
-Tachycardic, elevated WBC, enteritis on CT  -Abx as above. Blood cultures, UCx sent.  -remains tachycardic. Will do LR 75 cc / hr for 10 hours and avoid aggressive IVF rescusc given extensive pulm fibrosis and high likelihood of pulm HTN and possible cor pulmonale. -Tachycardic, elevated WBC, enteritis on CT  -Abx as above. Blood cultures, UCx sent.  -Tachycardia resolved s/p maintenance fluids

## 2019-10-06 NOTE — PROGRESS NOTE ADULT - SUBJECTIVE AND OBJECTIVE BOX
Caryl Dalton MD  [facility]  Pager: [phone] / [employer] [phone]     Patient is a 86y old  Male who presents with a chief complaint of abdominal pain.     SUBJECTIVE / OVERNIGHT EVENTS:  Patient seen and examined at bedside. No acute events overnight. Pt denies any N/V/D.     Other Review of Systems Negative.    MEDICATIONS  (STANDING):  ciprofloxacin   IVPB 400 milliGRAM(s) IV Intermittent every 12 hours  heparin  Injectable 5000 Unit(s) SubCutaneous every 8 hours  hydroxychloroquine 200 milliGRAM(s) Oral two times a day  influenza   Vaccine 0.5 milliLiter(s) IntraMuscular once  lactated ringers. 1000 milliLiter(s) (75 mL/Hr) IV Continuous <Continuous>  metroNIDAZOLE  IVPB 500 milliGRAM(s) IV Intermittent every 8 hours  pantoprazole    Tablet 40 milliGRAM(s) Oral every 12 hours    MEDICATIONS  (PRN):      OBJECTIVE:    Vital Signs Last 24 Hrs  T(C): 36.3 (06 Oct 2019 05:40), Max: 37.1 (05 Oct 2019 14:30)  T(F): 97.4 (06 Oct 2019 05:40), Max: 98.7 (05 Oct 2019 14:30)  HR: 81 (06 Oct 2019 05:40) (81 - 107)  BP: 112/76 (06 Oct 2019 05:40) (105/69 - 127/87)  BP(mean): --  RR: 18 (06 Oct 2019 05:40) (17 - 18)  SpO2: 100% (06 Oct 2019 05:40) (95% - 100%)    CAPILLARY BLOOD GLUCOSE        I&O's Summary    05 Oct 2019 07:01  -  06 Oct 2019 07:00  --------------------------------------------------------  IN: 0 mL / OUT: 400 mL / NET: -400 mL      PHYSICAL EXAM:   	GEN: Age appropriate, resting comfortably in bed, no acute distress, non toxic appearing, speaking in complete sentences.   	HEENT: Conjunctiva and sclera normal  	PULM: crackles bilaterally, louder at the bases  	CV: RRR, S1S2, no MRG  	MSK: no stiffness or joint effusions  	Abdominal: Soft, nontender to palpation, non-distended, +BS  	Extremities: No edema or cyanosis  	NEURO: AAOx3  	Psych: normal affect, normal behavior  	Skin: No rashes, lesions      LABS:                        9.1    10.55 )-----------( 405      ( 06 Oct 2019 05:30 )             27.5     Auto Eosinophil # 0.15  / Auto Eosinophil % 1.4   / Auto Neutrophil # 8.30  / Auto Neutrophil % 78.6  / BANDS % x                            10.8   17.81 )-----------( 487      ( 05 Oct 2019 14:55 )             32.7     Auto Eosinophil # 0.01  / Auto Eosinophil % 0.1   / Auto Neutrophil # 15.64 / Auto Neutrophil % 87.8  / BANDS % x        10-06    130<L>  |  95<L>  |  15  ----------------------------<  81  3.5   |  25  |  0.79  10-05    130<L>  |  89<L>  |  20  ----------------------------<  111<H>  4.6   |  26  |  0.85    Ca    8.8      06 Oct 2019 05:30  Mg     1.7     10-06  Phos  2.6     10-06  TPro  6.1  /  Alb  2.5<L>  /  TBili  0.2  /  DBili  x   /  AST  17  /  ALT  8   /  AlkPhos  64  10-06  TPro  7.7  /  Alb  3.2<L>  /  TBili  0.3  /  DBili  x   /  AST  21  /  ALT  10  /  AlkPhos  76  10-05    PT/INR - ( 05 Oct 2019 14:55 )   PT: 14.2 SEC;   INR: 1.24          PTT - ( 05 Oct 2019 14:55 )  PTT:31.7 SEC        VBG: ( 19:25 ) - VBG - pH: 7.40  | pCO2: 45    | pO2: 56    | Lactate: 1.3        EKG reviewed by me:  	Rate: 102  	Rhythm: sinus tachycardia  	Axis: LAD  	VA: 140  	QRS: 126, RBBB  	QTC: 518  	No Rylan/STd/TWI in two or more continguous leads    	CTAP:  	IMPRESSION:     	Moderate small bowel dilatation likely related to enteritis with   	differential diagnosis of incomplete or early small bowel obstruction.   	Suggest follow-up imaging to track passage of contrast.  Extensive pulmonary fibrosis      Care Discussed with Consultants/Other Providers: Yes. Surgery     RADIOLOGY & ADDITIONAL TESTS: Yes   (Imaging Personally Reviewed) Caryl Dalton MD  [facility]  Pager: [phone] / [employer] [phone]     Patient is a 86y old  Male who presents with a chief complaint of abdominal pain.     SUBJECTIVE / OVERNIGHT EVENTS:  Patient seen and examined at bedside. No acute events overnight. Pt denies any N/V/D. Pt reports that his abdominal pain has resolved.     Other Review of Systems Negative.    MEDICATIONS  (STANDING):  cholecalciferol 1000 Unit(s) Oral daily  ciprofloxacin   IVPB 400 milliGRAM(s) IV Intermittent every 12 hours  heparin  Injectable 5000 Unit(s) SubCutaneous every 8 hours  hydroxychloroquine 200 milliGRAM(s) Oral two times a day  influenza   Vaccine 0.5 milliLiter(s) IntraMuscular once  metroNIDAZOLE  IVPB 500 milliGRAM(s) IV Intermittent every 8 hours  multivitamin 1 Tablet(s) Oral daily  pantoprazole    Tablet 40 milliGRAM(s) Oral before breakfast    MEDICATIONS  (PRN):      OBJECTIVE:    Vital Signs Last 24 Hrs  T(C): 36.3 (06 Oct 2019 05:40), Max: 37.1 (05 Oct 2019 14:30)  T(F): 97.4 (06 Oct 2019 05:40), Max: 98.7 (05 Oct 2019 14:30)  HR: 81 (06 Oct 2019 05:40) (81 - 107)  BP: 112/76 (06 Oct 2019 05:40) (105/69 - 127/87)  BP(mean): --  RR: 18 (06 Oct 2019 05:40) (17 - 18)  SpO2: 100% (06 Oct 2019 05:40) (95% - 100%)    CAPILLARY BLOOD GLUCOSE        I&O's Summary    05 Oct 2019 07:01  -  06 Oct 2019 07:00  --------------------------------------------------------  IN: 0 mL / OUT: 400 mL / NET: -400 mL      PHYSICAL EXAM:   	GEN: Age appropriate, resting comfortably in bed, no acute distress, non toxic appearing, speaking in complete sentences.   	HEENT: Conjunctiva and sclera normal  	PULM: crackles bilaterally, louder at the bases  	CV: RRR, S1S2, no MRG  	MSK: no stiffness or joint effusions  	Abdominal: Soft, nontender to palpation, non-distended, +BS  	Extremities: No edema or cyanosis  	NEURO: AAOx3  	Psych: normal affect, normal behavior  	Skin: No rashes, lesions      LABS:                        9.1    10.55 )-----------( 405      ( 06 Oct 2019 05:30 )             27.5     Auto Eosinophil # 0.15  / Auto Eosinophil % 1.4   / Auto Neutrophil # 8.30  / Auto Neutrophil % 78.6  / BANDS % x                            10.8   17.81 )-----------( 487      ( 05 Oct 2019 14:55 )             32.7     Auto Eosinophil # 0.01  / Auto Eosinophil % 0.1   / Auto Neutrophil # 15.64 / Auto Neutrophil % 87.8  / BANDS % x        10-06    130<L>  |  95<L>  |  15  ----------------------------<  81  3.5   |  25  |  0.79  10-05    130<L>  |  89<L>  |  20  ----------------------------<  111<H>  4.6   |  26  |  0.85    Ca    8.8      06 Oct 2019 05:30  Mg     1.7     10-06  Phos  2.6     10-06  TPro  6.1  /  Alb  2.5<L>  /  TBili  0.2  /  DBili  x   /  AST  17  /  ALT  8   /  AlkPhos  64  10-06  TPro  7.7  /  Alb  3.2<L>  /  TBili  0.3  /  DBili  x   /  AST  21  /  ALT  10  /  AlkPhos  76  10-05    PT/INR - ( 05 Oct 2019 14:55 )   PT: 14.2 SEC;   INR: 1.24          PTT - ( 05 Oct 2019 14:55 )  PTT:31.7 SEC        VBG: ( 19:25 ) - VBG - pH: 7.40  | pCO2: 45    | pO2: 56    | Lactate: 1.3        EKG reviewed by me:  	Rate: 102  	Rhythm: sinus tachycardia  	Axis: LAD  	NY: 140  	QRS: 126, RBBB  	QTC: 518  	No Rylan/STd/TWI in two or more continguous leads    	CTAP:  	IMPRESSION:     	Moderate small bowel dilatation likely related to enteritis with   	differential diagnosis of incomplete or early small bowel obstruction.   	Suggest follow-up imaging to track passage of contrast.  Extensive pulmonary fibrosis      Care Discussed with Consultants/Other Providers: Yes. Surgery     RADIOLOGY & ADDITIONAL TESTS: Yes   (Imaging Personally Reviewed)

## 2019-10-06 NOTE — PROGRESS NOTE ADULT - ASSESSMENT
86 year old male with PMH of colon cancer (10+ years ago, s/p laparoscopic colon resection, chemo/XR) recurrent small bowel obstructions (most recent 06/19) RA (on hydroxychloroquine), pulm fibrosis, PUD (GIB 11/18), HTN, barretts esophagus, PVD, and arthritis who presents with 1 day hx of nausea, vomiting, and diffuse abdominal pain found to have sepsis 2/2 enteritis seen on CT.

## 2019-10-06 NOTE — PROGRESS NOTE ADULT - ASSESSMENT
ASSESSMENT:   86M with enteritis. Surgery consulted for CT read of small bowel dilation, rule out obstruction. Patient passing flatus, benign abdominal exam and no abdominal pain, tolerating diet. No sign of clinical obstruction at this time.     PLAN:   - C/w antibiotics, IVF hydration  - Advance diet as tolerated   - No acute surgical intervention  - Care per primary team  - Please call with questions     B Team Surgery   [id number]

## 2019-10-06 NOTE — PROGRESS NOTE ADULT - PROBLEM SELECTOR PLAN 7
-Hold home anti-HTN meds in setting of sepsis  -Clarify med rec this AM - Pt not on any anti-hypertensives currently   - Continue to monitor BP

## 2019-10-06 NOTE — PROGRESS NOTE ADULT - SUBJECTIVE AND OBJECTIVE BOX
SURGERY PROGRESS NOTE    86yMale    SUBJECTIVE:  Patient seen and examined at bedside. No acute events overnight. Tolerated clears. Passing gas. No abdominal pain.  Denies fevers, chills, nausea, vomiting, chest pain, and shortness of breath.     --------------------------------------------------------------------------------------------------  OBJECTIVE:     Physical Exam:  General: AAOx3, NAD, resting comfortably   HEENT: NC/AT  Respiratory: no increased work of breathing   Abdomen: soft, nontender, nondistended, no rebound or guarding   Extremities: warm and well perfused  --------------------------------------------------------------------------------------------------  Vital Signs:  Vital Signs Last 24 Hrs  T(C): 36.3 (06 Oct 2019 05:40), Max: 37.1 (05 Oct 2019 14:30)  T(F): 97.4 (06 Oct 2019 05:40), Max: 98.7 (05 Oct 2019 14:30)  HR: 81 (06 Oct 2019 05:40) (81 - 107)  BP: 112/76 (06 Oct 2019 05:40) (105/69 - 127/87)  BP(mean): --  RR: 18 (06 Oct 2019 05:40) (17 - 18)  SpO2: 100% (06 Oct 2019 05:40) (95% - 100%)    --------------------------------------------------------------------------------------------------  Inputs/Outputs:    05 Oct 2019 07:01  -  06 Oct 2019 07:00  --------------------------------------------------------  IN:  Total IN: 0 mL    OUT:    Voided: 400 mL  Total OUT: 400 mL    Total NET: -400 mL        --------------------------------------------------------------------------------------------------  Laboratories:                        9.1    10.55 )-----------( 405      ( 06 Oct 2019 05:30 )             27.5     LIVER FUNCTIONS - ( 06 Oct 2019 05:30 )  Alb: 2.5 g/dL / Pro: 6.1 g/dL / ALK PHOS: 64 u/L / ALT: 8 u/L / AST: 17 u/L / GGT: x             06 Oct 2019 05:30    130    |  95     |  15     ----------------------------<  81     3.5     |  25     |  0.79     Ca    8.8        06 Oct 2019 05:30  Phos  2.6       06 Oct 2019 05:30  Mg     1.7       06 Oct 2019 05:30    TPro  6.1    /  Alb  2.5    /  TBili  0.2    /  DBili  x      /  AST  17     /  ALT  8      /  AlkPhos  64     06 Oct 2019 05:30    PT/INR - ( 05 Oct 2019 14:55 )   PT: 14.2 SEC;   INR: 1.24        PTT - ( 05 Oct 2019 14:55 )  PTT:31.7 SEC      --------------------------------------------------------------------------------------------------  Medications:  MEDICATIONS  (STANDING):  ciprofloxacin   IVPB 400 milliGRAM(s) IV Intermittent every 12 hours  heparin  Injectable 5000 Unit(s) SubCutaneous every 8 hours  hydroxychloroquine 200 milliGRAM(s) Oral daily  influenza   Vaccine 0.5 milliLiter(s) IntraMuscular once  lactated ringers. 1000 milliLiter(s) (75 mL/Hr) IV Continuous <Continuous>  metroNIDAZOLE  IVPB 500 milliGRAM(s) IV Intermittent every 8 hours  pantoprazole    Tablet 40 milliGRAM(s) Oral every 12 hours    MEDICATIONS  (PRN):

## 2019-10-06 NOTE — PROGRESS NOTE ADULT - PROBLEM SELECTOR PLAN 8
-On PPI BID at home, will start pantoprazole 40 BID -On omeprazole 40 daily at home, will c/w pantoprazole 40 daily here

## 2019-10-06 NOTE — PROGRESS NOTE ADULT - PROBLEM SELECTOR PLAN 6
-PExam with diffuse crackles bilaterally. Imaging showing extensive pulm fibrosis  -Will contact patients rheumatologist to discuss this finding and to assess whether or not patient has PFTs, TTE to eval for PHT etc.  -will order TTE here to assess for pulm HTN, cor pulmonale -PExam with diffuse crackles bilaterally. Imaging showing extensive pulm fibrosis  - Outpatient follow up advised

## 2019-10-06 NOTE — PROGRESS NOTE ADULT - PROBLEM SELECTOR PLAN 4
-Na 130 in setting of decreased PO intake and hx of nausea, abdominal pain  -Likely euvolemic vs hypovolemic  -will check AM BMP s/p 1L of fluid to reassess Na. Repeat Na 130 -Na 130 in setting of decreased PO intake and hx of nausea, abdominal pain  -Likely euvolemic vs hypovolemic  -will check AM BMP s/p 1L of fluid to reassess Na. Repeat Na 130  - Will continue to monitor BMP

## 2019-10-06 NOTE — PROGRESS NOTE ADULT - PROBLEM SELECTOR PLAN 5
-C/w home  BID with lunch and dinner -C/w home  BID with lunch and dinner  - C/w MVI and vitamin D as well

## 2019-10-06 NOTE — PROGRESS NOTE ADULT - PROBLEM SELECTOR PLAN 1
-1 day hx of nausea, vomiting, and diffuse abdominal pain.  -CTAP showing moderate small bowel dilatation likely related to enteritis   -c/w cipro and metronidazole IV. Switch to PO tomorrow. Treat for 5 days. Day 1/5   -Will send GI PCR, stool culture, Ova and parasite given pt is immunocompromised   -n/v now resolved. Caution against anti-emetics in the setting of prolonged Qtc. Will replete mag, follow up repeat EKG in AM  -clears diet, advance as tolerated -1 day hx of nausea, vomiting, and diffuse abdominal pain.  -CTAP showing moderate small bowel dilatation likely related to enteritis   -c/w cipro and metronidazole IV (day 1/5). Switch to PO tomorrow  -Will send GI PCR, stool culture, Ova and parasite given pt is immunocompromised   -n/v now resolved. Caution against anti-emetics in the setting of prolonged Qtc. Will replete mag   -Tolerated clear diet well, will advance to regular

## 2019-10-06 NOTE — PROGRESS NOTE ADULT - PROBLEM SELECTOR PLAN 9
-Diet clears, advance as tolerated   -DVT prophylaxis with HSQ  -PT eval -Diet advanced to regular, assess how tolerates   -DVT prophylaxis with HSQ  -PT eval pending

## 2019-10-06 NOTE — PROGRESS NOTE ADULT - PROBLEM SELECTOR PLAN 3
-CTAP showed Moderate small bowel dilatation likely related to enteritis with   differential diagnosis of incomplete or early small bowel obstruction.   -Surg evaluated patient, does not think obstruction. Pt passing gas and having BMs  -will consider follow-up imaging to track passage of contrast this AM  -serial abdominal exams  -clear liquid diet, advance as tolerated -CTAP showed Moderate small bowel dilatation likely related to enteritis with   differential diagnosis of incomplete or early small bowel obstruction.   -Surg evaluated patient, does not think obstruction. Pt passing gas and having BMs  -will consider follow-up imaging to track passage of contrast, not necessary at this time   -serial abdominal exams  -Will advance diet to regular

## 2019-10-07 ENCOUNTER — TRANSCRIPTION ENCOUNTER (OUTPATIENT)
Age: 84
End: 2019-10-07

## 2019-10-07 LAB
ANION GAP SERPL CALC-SCNC: 12 MMO/L — SIGNIFICANT CHANGE UP (ref 7–14)
BASOPHILS # BLD AUTO: 0.03 K/UL — SIGNIFICANT CHANGE UP (ref 0–0.2)
BASOPHILS NFR BLD AUTO: 0.3 % — SIGNIFICANT CHANGE UP (ref 0–2)
BUN SERPL-MCNC: 9 MG/DL — SIGNIFICANT CHANGE UP (ref 7–23)
CALCIUM SERPL-MCNC: 8.6 MG/DL — SIGNIFICANT CHANGE UP (ref 8.4–10.5)
CHLORIDE SERPL-SCNC: 94 MMOL/L — LOW (ref 98–107)
CO2 SERPL-SCNC: 25 MMOL/L — SIGNIFICANT CHANGE UP (ref 22–31)
CREAT SERPL-MCNC: 0.75 MG/DL — SIGNIFICANT CHANGE UP (ref 0.5–1.3)
EOSINOPHIL # BLD AUTO: 0.09 K/UL — SIGNIFICANT CHANGE UP (ref 0–0.5)
EOSINOPHIL NFR BLD AUTO: 1 % — SIGNIFICANT CHANGE UP (ref 0–6)
GLUCOSE SERPL-MCNC: 95 MG/DL — SIGNIFICANT CHANGE UP (ref 70–99)
HCT VFR BLD CALC: 29.8 % — LOW (ref 39–50)
HGB BLD-MCNC: 9.8 G/DL — LOW (ref 13–17)
IMM GRANULOCYTES NFR BLD AUTO: 0.3 % — SIGNIFICANT CHANGE UP (ref 0–1.5)
LYMPHOCYTES # BLD AUTO: 1.15 K/UL — SIGNIFICANT CHANGE UP (ref 1–3.3)
LYMPHOCYTES # BLD AUTO: 12.2 % — LOW (ref 13–44)
MAGNESIUM SERPL-MCNC: 1.6 MG/DL — SIGNIFICANT CHANGE UP (ref 1.6–2.6)
MCHC RBC-ENTMCNC: 28.7 PG — SIGNIFICANT CHANGE UP (ref 27–34)
MCHC RBC-ENTMCNC: 32.9 % — SIGNIFICANT CHANGE UP (ref 32–36)
MCV RBC AUTO: 87.1 FL — SIGNIFICANT CHANGE UP (ref 80–100)
MONOCYTES # BLD AUTO: 0.65 K/UL — SIGNIFICANT CHANGE UP (ref 0–0.9)
MONOCYTES NFR BLD AUTO: 6.9 % — SIGNIFICANT CHANGE UP (ref 2–14)
NEUTROPHILS # BLD AUTO: 7.49 K/UL — HIGH (ref 1.8–7.4)
NEUTROPHILS NFR BLD AUTO: 79.3 % — HIGH (ref 43–77)
NRBC # FLD: 0 K/UL — SIGNIFICANT CHANGE UP (ref 0–0)
PHOSPHATE SERPL-MCNC: 2 MG/DL — LOW (ref 2.5–4.5)
PLATELET # BLD AUTO: 435 K/UL — HIGH (ref 150–400)
PMV BLD: 9.1 FL — SIGNIFICANT CHANGE UP (ref 7–13)
POTASSIUM SERPL-MCNC: 3.3 MMOL/L — LOW (ref 3.5–5.3)
POTASSIUM SERPL-SCNC: 3.3 MMOL/L — LOW (ref 3.5–5.3)
RBC # BLD: 3.42 M/UL — LOW (ref 4.2–5.8)
RBC # FLD: 15.4 % — HIGH (ref 10.3–14.5)
SODIUM SERPL-SCNC: 131 MMOL/L — LOW (ref 135–145)
WBC # BLD: 9.44 K/UL — SIGNIFICANT CHANGE UP (ref 3.8–10.5)
WBC # FLD AUTO: 9.44 K/UL — SIGNIFICANT CHANGE UP (ref 3.8–10.5)

## 2019-10-07 PROCEDURE — 99232 SBSQ HOSP IP/OBS MODERATE 35: CPT | Mod: GC

## 2019-10-07 RX ORDER — SODIUM,POTASSIUM PHOSPHATES 278-250MG
1 POWDER IN PACKET (EA) ORAL
Refills: 0 | Status: COMPLETED | OUTPATIENT
Start: 2019-10-07 | End: 2019-10-08

## 2019-10-07 RX ORDER — POTASSIUM CHLORIDE 20 MEQ
20 PACKET (EA) ORAL
Refills: 0 | Status: COMPLETED | OUTPATIENT
Start: 2019-10-07 | End: 2019-10-07

## 2019-10-07 RX ADMIN — Medication 100 MILLIGRAM(S): at 18:07

## 2019-10-07 RX ADMIN — PANTOPRAZOLE SODIUM 40 MILLIGRAM(S): 20 TABLET, DELAYED RELEASE ORAL at 06:10

## 2019-10-07 RX ADMIN — Medication 100 MILLIGRAM(S): at 03:23

## 2019-10-07 RX ADMIN — HEPARIN SODIUM 5000 UNIT(S): 5000 INJECTION INTRAVENOUS; SUBCUTANEOUS at 05:54

## 2019-10-07 RX ADMIN — Medication 20 MILLIEQUIVALENT(S): at 12:27

## 2019-10-07 RX ADMIN — HEPARIN SODIUM 5000 UNIT(S): 5000 INJECTION INTRAVENOUS; SUBCUTANEOUS at 21:12

## 2019-10-07 RX ADMIN — Medication 200 MILLIGRAM(S): at 05:53

## 2019-10-07 RX ADMIN — Medication 1 TABLET(S): at 12:27

## 2019-10-07 RX ADMIN — Medication 1 PACKET(S): at 10:51

## 2019-10-07 RX ADMIN — Medication 20 MILLIEQUIVALENT(S): at 18:06

## 2019-10-07 RX ADMIN — Medication 100 MILLIGRAM(S): at 12:30

## 2019-10-07 RX ADMIN — Medication 1 PACKET(S): at 18:06

## 2019-10-07 RX ADMIN — Medication 200 MILLIGRAM(S): at 19:16

## 2019-10-07 RX ADMIN — Medication 200 MILLIGRAM(S): at 18:07

## 2019-10-07 RX ADMIN — Medication 200 MILLIGRAM(S): at 05:54

## 2019-10-07 RX ADMIN — Medication 1000 UNIT(S): at 12:27

## 2019-10-07 RX ADMIN — Medication 20 MILLIEQUIVALENT(S): at 10:51

## 2019-10-07 NOTE — PROGRESS NOTE ADULT - PROBLEM SELECTOR PLAN 2
-Tachycardic, elevated WBC, enteritis on CT  -Abx as above. Blood cultures, UCx sent.  -Tachycardia resolved s/p maintenance fluids -Tachycardic, elevated WBC, enteritis on CT  -Abx as above   -Tachycardia resolved s/p maintenance fluids

## 2019-10-07 NOTE — PROGRESS NOTE ADULT - PROBLEM SELECTOR PLAN 4
-Na 130 in setting of decreased PO intake and hx of nausea, abdominal pain  -Likely euvolemic vs hypovolemic  -will check AM BMP s/p 1L of fluid to reassess Na. 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 If Na < 130, will check lytes and do workup   - Will continue to monitor BMP

## 2019-10-07 NOTE — PROGRESS NOTE ADULT - PROBLEM SELECTOR PLAN 3
-CTAP showed Moderate small bowel dilatation likely related to enteritis with   differential diagnosis of incomplete or early small bowel obstruction.   -Surg evaluated patient, does not think obstruction. Pt passing gas and having BMs  -will consider follow-up imaging to track passage of contrast, not necessary at this time   -serial abdominal exams  -Assess how tolerating regular diet -CTAP showed Moderate small bowel dilatation likely related to enteritis with   differential diagnosis of incomplete or early small bowel obstruction.   -Surg evaluated patient, does not think obstruction. Pt passing gas and having BMs  -serial abdominal exams  - Tolerating regular diet well

## 2019-10-07 NOTE — DISCHARGE NOTE PROVIDER - HOSPITAL COURSE
The pt is a 86 year old male with PMH of colon cancer (10+ years ago, s/p laparoscopic colon resection, chemo/XR) recurrent small bowel obstructions (most recent 06/19) RA (on hydroxychloroquine), pulm fibrosis, PUD (GIB 11/18), HTN, barretts esophagus, PVD, and arthritis who presented with 1 day hx of nausea, vomiting, and diffuse abdominal pain. Daughter reported that the patient had eaten a large amount of fried veggie sticks yesterday which is not usual diet for him. Aside from that, denied sick contacts or eating other food out of the ordinary. Patient had 1 episode of diarrhea. In the ED vital signs: 98.7, 106, 118/76, 18, 100. He received cipro, metro, 1L .9NS IVF, and odansetron in ED. CT abd/p showed moderate small bowel dilatation likely related to enteritis with differential diagnosis of incomplete or early small bowel obstruction, along with extensive pulmonary fibrosis. Pt was admitted for sepsis 2/2 enteritis seen on CT.              Problem/Plan - 1:    ·  Problem: Enteritis.  Plan: -1 day hx of nausea, vomiting, and diffuse abdominal pain.    -CTAP showing moderate small bowel dilatation likely related to enteritis     -c/w cipro and metronidazole IV (day 1/5). Switch to PO tomorrow    -Will send GI PCR, stool culture, Ova and parasite given pt is immunocompromised     -n/v now resolved. Caution against anti-emetics in the setting of prolonged Qtc. Will replete mag     -Tolerated clear diet well, will advance to regular.          Problem/Plan - 2:    ·  Problem: Sepsis.  Plan: -Tachycardic, elevated WBC, enteritis on CT    -Abx as above. Blood cultures, UCx sent.    -Tachycardia resolved s/p maintenance fluids.          Problem/Plan - 3:    ·  Problem: R/O SBO (small bowel obstruction).  Plan: -CTAP showed Moderate small bowel dilatation likely related to enteritis with     differential diagnosis of incomplete or early small bowel obstruction.     -Surg evaluated patient, does not think obstruction. Pt passing gas and having BMs    -will consider follow-up imaging to track passage of contrast, not necessary at this time     -serial abdominal exams    -Will advance diet to regular.          Problem/Plan - 4:    ·  Problem: Acute hyponatremia.  Plan: -Na 130 in setting of decreased PO intake and hx of nausea, abdominal pain    -Likely euvolemic vs hypovolemic    -will check AM BMP s/p 1L of fluid to reassess Na. Repeat Na 130    - Will continue to monitor BMP.          Problem/Plan - 5:    ·  Problem: Rheumatoid arthritis.  Plan: -C/w home  BID with lunch and dinner    - C/w MVI and vitamin D as well.          Problem/Plan - 6:    Problem: Pulmonary fibrosis. Plan: -PExam with diffuse crackles bilaterally. Imaging showing extensive pulm fibrosis    - Outpatient follow up advised.         Problem/Plan - 7:    ·  Problem: HTN (hypertension).  Plan: - Pt not on any anti-hypertensives currently     - Continue to monitor BP.          Problem/Plan - 8:    ·  Problem: PUD (peptic ulcer disease).  Plan: -On omeprazole 40 daily at home, will c/w pantoprazole 40 daily here.          Problem/Plan - 9:    ·  Problem: Need for prophylactic measure.  Plan: -Diet advanced to regular, assess how tolerates     -DVT prophylaxis with HSQ    -PT eval pending. The pt is a 86 year old male with PMH of colon cancer (10+ years ago, s/p laparoscopic colon resection, chemo/XR) recurrent small bowel obstructions (most recent 06/19) RA (on hydroxychloroquine), pulm fibrosis, PUD (GIB 11/18), HTN, barretts esophagus, PVD, and arthritis who presented with 1 day hx of nausea, vomiting, and diffuse abdominal pain. Daughter reported that the patient had eaten a large amount of fried veggie sticks yesterday which is not usual diet for him. Aside from that, denied sick contacts or eating other food out of the ordinary. Patient had 1 episode of diarrhea. In the ED vital signs: 98.7, 106, 118/76, 18, 100. He received cipro, metro, 1L .9NS IVF, and odansetron in ED. CT abd/p showed moderate small bowel dilatation likely related to enteritis with differential diagnosis of incomplete or early small bowel obstruction, along with extensive pulmonary fibrosis. Pt was admitted for sepsis 2/2 enteritis seen on CT. Pt was treated with Cipro and Flagyl for a 5 day course. Pt was evaluated by Surgery, who did not think that there was an obstruction, as pt was passing gas and had BMs. Serial abdominal exams were performed. Pt's nausea, vomiting, diarrhea, and abdominal pain resolved. Pt's diet was advanced and pt tolerated it well. Pt noted to have acute hyponatremia which did not respond to fluids; likely SIADH in the setting of pain and nausea. Pt was advised to follow up.              Problem/Plan - 4:    ·  Problem: Acute hyponatremia.  Plan: -Na 130 in setting of decreased PO intake and hx of nausea, abdominal pain    -Likely euvolemic vs hypovolemic    -will check AM BMP s/p 1L of fluid to reassess Na. Repeat Na 130    - Will continue to monitor BMP.          Problem/Plan - 5:    ·  Problem: Rheumatoid arthritis.  Plan: -C/w home  BID with lunch and dinner    - C/w MVI and vitamin D as well.          Problem/Plan - 6:    Problem: Pulmonary fibrosis. Plan: -PExam with diffuse crackles bilaterally. Imaging showing extensive pulm fibrosis    - Outpatient follow up advised.         Problem/Plan - 7:    ·  Problem: HTN (hypertension).  Plan: - Pt not on any anti-hypertensives currently     - Continue to monitor BP.          Problem/Plan - 8:    ·  Problem: PUD (peptic ulcer disease).  Plan: -On omeprazole 40 daily at home, will c/w pantoprazole 40 daily here.          Problem/Plan - 9:    ·  Problem: Need for prophylactic measure.  Plan: -Diet advanced to regular, assess how tolerates     -DVT prophylaxis with HSQ    -PT eval pending. The pt is a 86 year old male with PMH of colon cancer (10+ years ago, s/p laparoscopic colon resection, chemo/XR) recurrent small bowel obstructions (most recent 06/19) RA (on hydroxychloroquine), pulm fibrosis, PUD (GIB 11/18), HTN, barretts esophagus, PVD, and arthritis who presented with 1 day hx of nausea, vomiting, and diffuse abdominal pain. Daughter reported that the patient had eaten a large amount of fried veggie sticks yesterday which is not usual diet for him. Aside from that, denied sick contacts or eating other food out of the ordinary. Patient had 1 episode of diarrhea. In the ED vital signs: 98.7, 106, 118/76, 18, 100. He received cipro, metro, 1L .9NS IVF, and odansetron in ED. CT abd/p showed moderate small bowel dilatation likely related to enteritis with differential diagnosis of incomplete or early small bowel obstruction, along with extensive pulmonary fibrosis. Pt was admitted for sepsis 2/2 enteritis seen on CT. Pt was treated with Cipro and Flagyl for a 5 day course. Pt was evaluated by Surgery, who did not think that there was an obstruction, as pt was passing gas and had BMs. Serial abdominal exams were performed. Pt's nausea, vomiting, diarrhea, and abdominal pain resolved. Pt's diet was advanced and pt tolerated it well. Pt noted to have acute hyponatremia which did not respond to fluids; likely SIADH in the setting of pain and nausea. Pt was seen by PT, who recommended home with outpatient services. Pt was advised to follow up with his PMD in 1 week and Pulmonology to address the pulmonary fibrosis. The pt is a 86 year old male with PMH of colon cancer (10+ years ago, s/p laparoscopic colon resection, chemo/XR) recurrent small bowel obstructions (most recent 06/19) RA (on hydroxychloroquine), pulm fibrosis, PUD (GIB 11/18), HTN, barretts esophagus, PVD, and arthritis who presented with 1 day hx of nausea, vomiting, and diffuse abdominal pain. Daughter reported that the patient had eaten a large amount of fried veggie sticks yesterday which is not usual diet for him. Aside from that, denied sick contacts or eating other food out of the ordinary. Patient had 1 episode of diarrhea. In the ED vital signs: 98.7, 106, 118/76, 18, 100. He received cipro, metro, 1L .9NS IVF, and odansetron in ED. CT abd/p showed moderate small bowel dilatation likely related to enteritis with differential diagnosis of incomplete or early small bowel obstruction, along with extensive pulmonary fibrosis. Pt was admitted for sepsis 2/2 enteritis seen on CT. Pt was treated with Cipro and Flagyl for a 5 day course. Pt was evaluated by Surgery, who did not think that there was an obstruction, as pt was passing gas and had BMs. Serial abdominal exams were performed. Pt's nausea, vomiting, diarrhea, and abdominal pain resolved. Pt's diet was advanced and pt tolerated it well. Pt noted to have acute hyponatremia which did not respond to fluids; likely SIADH in the setting of pain and nausea. Pt was seen by PT, who recommended home with outpatient services. Pt was medically stable for discharge and was advised to follow up with his PMD in 1 week and Pulmonology to address the pulmonary fibrosis. The pt is a 86 year old male with PMH of colon cancer (10+ years ago, s/p laparoscopic colon resection, chemo/XR) recurrent small bowel obstructions (most recent 06/19) RA (on hydroxychloroquine), pulm fibrosis, PUD (GIB 11/18), HTN, barretts esophagus, PVD, and arthritis who presented with 1 day hx of nausea, vomiting, and diffuse abdominal pain. Daughter reported that the patient had eaten a large amount of fried veggie sticks yesterday which is not usual diet for him. Aside from that, denied sick contacts or eating other food out of the ordinary. Patient had 1 episode of diarrhea.     In the ED vital signs: 98.7, 106, 118/76, 18, 100. He received cipro, metro, 1L .9NS IVF, and odansetron in ED. CT abd/p showed moderate small bowel dilatation likely related to enteritis with differential diagnosis of incomplete or early small bowel obstruction, along with extensive pulmonary fibrosis. Pt was admitted for sepsis 2/2 enteritis seen on CT. Pt was treated with Cipro and Flagyl for a 5 day course. Pt was evaluated by Surgery, who did not think that there was an obstruction, as pt was passing gas and had BMs. Serial abdominal exams were performed. Pt's nausea, vomiting, diarrhea, and abdominal pain resolved. Pt's diet was advanced and pt tolerated it well. Pt noted to have acute hyponatremia which did not respond to fluids; likely SIADH in the setting of pain and nausea. Pt was seen by PT, who recommended home with outpatient services. Pt was medically stable for discharge and was advised to follow up with his PMD in 1 week and Pulmonology to address the pulmonary fibrosis.

## 2019-10-07 NOTE — DISCHARGE NOTE PROVIDER - CARE PROVIDER_API CALL
Darrell Boyce)  Internal Medicine  [address] [place] [address]  Phone: [phone]  Fax: [phone]  Follow Up Time: 1 week

## 2019-10-07 NOTE — PROGRESS NOTE ADULT - PROBLEM SELECTOR PLAN 6
-PExam with diffuse crackles bilaterally. Imaging showing extensive pulm fibrosis  - Outpatient follow up advised -PExam with diffuse crackles bilaterally. Imaging showing extensive pulm fibrosis  - Pt was recently found to have pulmonary fibrosis in 9/2019  - Pt w/ no respiratory symptoms    - Outpatient follow up advised w/ Pulmonology

## 2019-10-07 NOTE — DISCHARGE NOTE PROVIDER - NSDCCPCAREPLAN_GEN_ALL_CORE_FT
PRINCIPAL DISCHARGE DIAGNOSIS  Diagnosis: Colitis  Assessment and Plan of Treatment: You came in with nausea, vomiting, and abdominal pain. CAT scan of the abdomen showed moderate small bowel dilation likely due to infection. Surgery came to see you and said there was no surgical intervention at this time. You were treated with the antibiotics Cipro and Flagyl. It is important that you follow up with your primary medical doctor in 1 week.         SECONDARY DISCHARGE DIAGNOSES  Diagnosis: Acute hyponatremia  Assessment and Plan of Treatment: Acute hyponatremia    Diagnosis: Pulmonary fibrosis  Assessment and Plan of Treatment: Pulmonary fibrosis PRINCIPAL DISCHARGE DIAGNOSIS  Diagnosis: Colitis  Assessment and Plan of Treatment: You came in with nausea, vomiting, and abdominal pain. CAT scan of the abdomen showed moderate small bowel dilation likely due to infection. Surgery came to see you and said there was no surgical intervention at this time. You were treated with the antibiotics Cipro and Flagyl. It is important that you follow up with your primary medical doctor in 1 week.         SECONDARY DISCHARGE DIAGNOSES  Diagnosis: Acute hyponatremia  Assessment and Plan of Treatment: You were found to have low sodium. This was likely in the setting of inappropriate hormone secretion due to nausea and abdominal pain. Please follow up with your primary medical doctor in 1-2 weeks to have repeat sodium testing done.    Diagnosis: Pulmonary fibrosis  Assessment and Plan of Treatment: A CAT scan of the lungs showed scarring. You did not have any pulmonary symptoms. It is important that you follow up with Pulmonology for further testing regarding this. PRINCIPAL DISCHARGE DIAGNOSIS  Diagnosis: Colitis  Assessment and Plan of Treatment: You came in with nausea, vomiting, and abdominal pain. CAT scan of the abdomen showed moderate small bowel dilation likely due to infection. Surgery came to see you and said there was no surgical intervention at this time. You were treated with the antibiotics Cipro and Flagyl. It is important that you follow up with your primary medical doctor in 1 week.   Darrell Boyce)  Internal Medicine  [address] [place] [address]  Phone: [phone]        SECONDARY DISCHARGE DIAGNOSES  Diagnosis: Acute hyponatremia  Assessment and Plan of Treatment: You were found to have low sodium. This was likely in the setting of inappropriate hormone secretion due to nausea and abdominal pain. Please follow up with your primary medical doctor in 1-2 weeks to have repeat sodium testing done.    Diagnosis: Pulmonary fibrosis  Assessment and Plan of Treatment: A CAT scan of the lungs showed scarring. You did not have any pulmonary symptoms. It is important that you follow up with Pulmonology for further testing regarding this.  [facility] Pulmonolgy and Sleep Medicine  Pulmonology  [address] [place] [address], [address]  [place] [address]  Phone: [phone]

## 2019-10-07 NOTE — DISCHARGE NOTE PROVIDER - NSDCFUADDAPPT_GEN_ALL_CORE_FT
Please follow up with Dr. Boyce in 1 week. Please call [phone] to set up an appointment. Please follow up with Dr. Boyce in 1 week. Please call [phone] to set up an appointment.    For Physical Therapy, please call [phone].   The address is:   [address] [place] [id number]

## 2019-10-07 NOTE — PROGRESS NOTE ADULT - PROBLEM SELECTOR PLAN 7
- Pt w/ history of HTN but not on any anti-hypertensives at home currently  - Continue to monitor BP

## 2019-10-07 NOTE — PROGRESS NOTE ADULT - PROBLEM SELECTOR PLAN 1
-1 day hx of nausea, vomiting, and diffuse abdominal pain.  -CTAP showing moderate small bowel dilatation likely related to enteritis   -c/w cipro and metronidazole IV (day 2/5). Switch to PO tomorrow  -Will send GI PCR, stool culture, Ova and parasite given pt is immunocompromised   -n/v now resolved. Caution against anti-emetics in the setting of prolonged Qtc. Will replete mag   -Advanced diet to regular, assess how tolerating -1 day hx of nausea, vomiting, and diffuse abdominal pain.  -CTAP showing moderate small bowel dilatation likely related to enteritis   -c/w cipro and metronidazole IV (day 2/5). Switch to PO tomorrow  -Will attempt to send GI PCR, stool culture, Ova and parasite given pt is immunocompromised. Diarrhea has resolved though    -n/v now resolved. Caution against anti-emetics in the setting of prolonged Qtc. Will replete electrolytes   -Advanced diet to regular, tolerating well

## 2019-10-07 NOTE — DISCHARGE NOTE PROVIDER - NSFOLLOWUPCLINICS_GEN_ALL_ED_FT
[facility] Pulmonolgy and Sleep Medicine  Pulmonology  [address] [place] [address], [facility] [address]  [place] [address]  Phone: [phone]  Fax:   Follow Up Time: 7-10 Days

## 2019-10-07 NOTE — PROGRESS NOTE ADULT - PROBLEM SELECTOR PLAN 9
-Diet advanced to regular, assess how tolerates   -DVT prophylaxis with HSQ  -PT eval pending -Diet regular   -DVT prophylaxis with HSQ  -PT eval pending

## 2019-10-08 ENCOUNTER — TRANSCRIPTION ENCOUNTER (OUTPATIENT)
Age: 84
End: 2019-10-08

## 2019-10-08 VITALS
HEART RATE: 91 BPM | TEMPERATURE: 97 F | OXYGEN SATURATION: 98 % | RESPIRATION RATE: 17 BRPM | DIASTOLIC BLOOD PRESSURE: 80 MMHG | SYSTOLIC BLOOD PRESSURE: 119 MMHG

## 2019-10-08 LAB
ANION GAP SERPL CALC-SCNC: 12 MMO/L — SIGNIFICANT CHANGE UP (ref 7–14)
BASOPHILS # BLD AUTO: 0.02 K/UL — SIGNIFICANT CHANGE UP (ref 0–0.2)
BASOPHILS NFR BLD AUTO: 0.2 % — SIGNIFICANT CHANGE UP (ref 0–2)
BUN SERPL-MCNC: 7 MG/DL — SIGNIFICANT CHANGE UP (ref 7–23)
CALCIUM SERPL-MCNC: 8.5 MG/DL — SIGNIFICANT CHANGE UP (ref 8.4–10.5)
CHLORIDE SERPL-SCNC: 95 MMOL/L — LOW (ref 98–107)
CO2 SERPL-SCNC: 24 MMOL/L — SIGNIFICANT CHANGE UP (ref 22–31)
CREAT SERPL-MCNC: 0.75 MG/DL — SIGNIFICANT CHANGE UP (ref 0.5–1.3)
EOSINOPHIL # BLD AUTO: 0.05 K/UL — SIGNIFICANT CHANGE UP (ref 0–0.5)
EOSINOPHIL NFR BLD AUTO: 0.5 % — SIGNIFICANT CHANGE UP (ref 0–6)
GLUCOSE SERPL-MCNC: 84 MG/DL — SIGNIFICANT CHANGE UP (ref 70–99)
HCT VFR BLD CALC: 33.1 % — LOW (ref 39–50)
HGB BLD-MCNC: 10.4 G/DL — LOW (ref 13–17)
IMM GRANULOCYTES NFR BLD AUTO: 0.3 % — SIGNIFICANT CHANGE UP (ref 0–1.5)
LYMPHOCYTES # BLD AUTO: 1.16 K/UL — SIGNIFICANT CHANGE UP (ref 1–3.3)
LYMPHOCYTES # BLD AUTO: 11.9 % — LOW (ref 13–44)
MAGNESIUM SERPL-MCNC: 1.4 MG/DL — LOW (ref 1.6–2.6)
MCHC RBC-ENTMCNC: 28 PG — SIGNIFICANT CHANGE UP (ref 27–34)
MCHC RBC-ENTMCNC: 31.4 % — LOW (ref 32–36)
MCV RBC AUTO: 89 FL — SIGNIFICANT CHANGE UP (ref 80–100)
MONOCYTES # BLD AUTO: 0.65 K/UL — SIGNIFICANT CHANGE UP (ref 0–0.9)
MONOCYTES NFR BLD AUTO: 6.7 % — SIGNIFICANT CHANGE UP (ref 2–14)
NEUTROPHILS # BLD AUTO: 7.82 K/UL — HIGH (ref 1.8–7.4)
NEUTROPHILS NFR BLD AUTO: 80.4 % — HIGH (ref 43–77)
NRBC # FLD: 0 K/UL — SIGNIFICANT CHANGE UP (ref 0–0)
PHOSPHATE SERPL-MCNC: 1.9 MG/DL — LOW (ref 2.5–4.5)
PLATELET # BLD AUTO: 414 K/UL — HIGH (ref 150–400)
PMV BLD: 9.6 FL — SIGNIFICANT CHANGE UP (ref 7–13)
POTASSIUM SERPL-MCNC: 3.9 MMOL/L — SIGNIFICANT CHANGE UP (ref 3.5–5.3)
POTASSIUM SERPL-SCNC: 3.9 MMOL/L — SIGNIFICANT CHANGE UP (ref 3.5–5.3)
RBC # BLD: 3.72 M/UL — LOW (ref 4.2–5.8)
RBC # FLD: 15.3 % — HIGH (ref 10.3–14.5)
SODIUM SERPL-SCNC: 131 MMOL/L — LOW (ref 135–145)
WBC # BLD: 9.73 K/UL — SIGNIFICANT CHANGE UP (ref 3.8–10.5)
WBC # FLD AUTO: 9.73 K/UL — SIGNIFICANT CHANGE UP (ref 3.8–10.5)

## 2019-10-08 PROCEDURE — 99239 HOSP IP/OBS DSCHRG MGMT >30: CPT

## 2019-10-08 RX ORDER — SODIUM,POTASSIUM PHOSPHATES 278-250MG
1 POWDER IN PACKET (EA) ORAL
Refills: 0 | Status: DISCONTINUED | OUTPATIENT
Start: 2019-10-08 | End: 2019-10-08

## 2019-10-08 RX ORDER — CIPROFLOXACIN LACTATE 400MG/40ML
500 VIAL (ML) INTRAVENOUS EVERY 12 HOURS
Refills: 0 | Status: DISCONTINUED | OUTPATIENT
Start: 2019-10-08 | End: 2019-10-08

## 2019-10-08 RX ORDER — METRONIDAZOLE 500 MG
1 TABLET ORAL
Qty: 6 | Refills: 0
Start: 2019-10-08 | End: 2019-10-09

## 2019-10-08 RX ORDER — METRONIDAZOLE 500 MG
500 TABLET ORAL EVERY 8 HOURS
Refills: 0 | Status: DISCONTINUED | OUTPATIENT
Start: 2019-10-08 | End: 2019-10-08

## 2019-10-08 RX ORDER — MAGNESIUM SULFATE 500 MG/ML
2 VIAL (ML) INJECTION ONCE
Refills: 0 | Status: COMPLETED | OUTPATIENT
Start: 2019-10-08 | End: 2019-10-08

## 2019-10-08 RX ORDER — MOXIFLOXACIN HYDROCHLORIDE TABLETS, 400 MG 400 MG/1
1 TABLET, FILM COATED ORAL
Qty: 4 | Refills: 0
Start: 2019-10-08 | End: 2019-10-09

## 2019-10-08 RX ADMIN — PANTOPRAZOLE SODIUM 40 MILLIGRAM(S): 20 TABLET, DELAYED RELEASE ORAL at 06:30

## 2019-10-08 RX ADMIN — Medication 100 MILLIGRAM(S): at 09:54

## 2019-10-08 RX ADMIN — Medication 500 MILLIGRAM(S): at 15:07

## 2019-10-08 RX ADMIN — INFLUENZA VIRUS VACCINE 0.5 MILLILITER(S): 15; 15; 15; 15 SUSPENSION INTRAMUSCULAR at 15:07

## 2019-10-08 RX ADMIN — Medication 100 MILLIGRAM(S): at 02:32

## 2019-10-08 RX ADMIN — Medication 50 GRAM(S): at 09:54

## 2019-10-08 RX ADMIN — Medication 1 PACKET(S): at 13:14

## 2019-10-08 RX ADMIN — HEPARIN SODIUM 5000 UNIT(S): 5000 INJECTION INTRAVENOUS; SUBCUTANEOUS at 06:30

## 2019-10-08 RX ADMIN — Medication 200 MILLIGRAM(S): at 06:30

## 2019-10-08 RX ADMIN — Medication 1 PACKET(S): at 09:54

## 2019-10-08 RX ADMIN — Medication 1 TABLET(S): at 13:15

## 2019-10-08 RX ADMIN — Medication 1000 UNIT(S): at 13:15

## 2019-10-08 NOTE — PROGRESS NOTE ADULT - PROBLEM SELECTOR PLAN 6
-PExam with diffuse crackles bilaterally. Imaging showing extensive pulm fibrosis  - Pt was recently found to have pulmonary fibrosis in 9/2019  - Pt w/ no respiratory symptoms    - Outpatient follow up advised w/ Pulmonology

## 2019-10-08 NOTE — DISCHARGE NOTE NURSING/CASE MANAGEMENT/SOCIAL WORK - PATIENT PORTAL LINK FT
You can access the FollowMyHealth Patient Portal offered by [facility] by registering at the following website: [email or web address]. By joining [employer]’s FollowMyHealth portal, you will also be able to view your health information using other applications (apps) compatible with our system.

## 2019-10-08 NOTE — DISCHARGE NOTE NURSING/CASE MANAGEMENT/SOCIAL WORK - NSDCFUADDAPPT_GEN_ALL_CORE_FT
Please follow up with Dr. Boyce in 1 week. Please call [phone] to set up an appointment.    For Physical Therapy, please call [phone].   The address is:   [address] [place] [id number]

## 2019-10-08 NOTE — PROGRESS NOTE ADULT - PROBLEM SELECTOR PLAN 3
-CTAP showed Moderate small bowel dilatation likely related to enteritis with   differential diagnosis of incomplete or early small bowel obstruction.   -Surg evaluated patient, does not think obstruction. Pt passing gas and having BMs  -serial abdominal exams  - Tolerating regular diet well

## 2019-10-08 NOTE — PROGRESS NOTE ADULT - PROBLEM SELECTOR PLAN 2
-Tachycardic, elevated WBC, enteritis on CT  -Abx as above   -Tachycardia resolved s/p maintenance fluids

## 2019-10-08 NOTE — PROGRESS NOTE ADULT - PROBLEM SELECTOR PLAN 1
-1 day hx of nausea, vomiting, and diffuse abdominal pain.  -CTAP showing moderate small bowel dilatation likely related to enteritis   -c/w cipro and metronidazole IV (day 3/5). Switch to PO on discharge   -Will attempt to send GI PCR, stool culture, Ova and parasite given pt is immunocompromised. Diarrhea has resolved though    -n/v now resolved. Caution against anti-emetics in the setting of prolonged Qtc. Will replete electrolytes   - Diet regular, tolerating well

## 2019-10-08 NOTE — DISCHARGE NOTE NURSING/CASE MANAGEMENT/SOCIAL WORK - NSTRANSFERBELONGINGSRESP_GEN_A_NUR
[facility]    Progress Note    Vaughn Carrasco Patient Status:  Inpatient    7/10/1961 MRN [id number]   Location [facility] 4W/SW/SE Attending Samantha Jeffers MD   Hosp Day # 2 PCP Jacqui Bell DO       Subjective:   Vaughn Carrasco is a(n (PF) 2 MG/ML injection 2 mg, 2 mg, Intravenous, Q2H PRN **OR** morphINE sulfate (PF) 4 MG/ML injection 4 mg, 4 mg, Intravenous, Q2H PRN  •  ondansetron HCl (ZOFRAN) injection 4 mg, 4 mg, Intravenous, Q6H PRN  •  HYDROcodone-acetaminophen (NORCO) 5-325 MG p Intramuscular, Once    Assessment and Plan:     Cellulitis of left knee  With prepatellar bursitis  S/p R knee Bursa I+D  Started on IV abx, will cont  Cont pain control  Await final cultures prior to discharge  Will consult PT/OT    Other Issues  HTN  HL yes

## 2019-10-08 NOTE — PROGRESS NOTE ADULT - SUBJECTIVE AND OBJECTIVE BOX
Caryl Dalton MD  [facility] Medicine CMB  Pager: [phone] / [employer] [phone]     Patient is a 86y old  Male who presents with a chief complaint of abdominal pain.     SUBJECTIVE / OVERNIGHT EVENTS:  Patient seen and examined at bedside. No acute events overnight. Pt denies any N/V/D. Pt reports that his abdominal pain has resolved. Denies any chest pain or dyspnea.     Other Review of Systems Negative.    MEDICATIONS  (STANDING):  cholecalciferol 1000 Unit(s) Oral daily  ciprofloxacin   IVPB 400 milliGRAM(s) IV Intermittent every 12 hours  heparin  Injectable 5000 Unit(s) SubCutaneous every 8 hours  hydroxychloroquine 200 milliGRAM(s) Oral two times a day  influenza   Vaccine 0.5 milliLiter(s) IntraMuscular once  metroNIDAZOLE  IVPB 500 milliGRAM(s) IV Intermittent every 8 hours  multivitamin 1 Tablet(s) Oral daily  pantoprazole    Tablet 40 milliGRAM(s) Oral before breakfast  potassium chloride    Tablet ER 20 milliEquivalent(s) Oral every 2 hours  potassium phosphate / sodium phosphate powder 1 Packet(s) Oral three times a day with meals    MEDICATIONS  (PRN):      OBJECTIVE:    Vital Signs Last 24 Hrs  T(C): 36.3 (08 Oct 2019 06:27), Max: 37.1 (07 Oct 2019 21:50)  T(F): 97.4 (08 Oct 2019 06:27), Max: 98.7 (07 Oct 2019 21:50)  HR: 91 (08 Oct 2019 06:27) (91 - 99)  BP: 119/80 (08 Oct 2019 06:27) (112/74 - 119/80)  BP(mean): --  RR: 17 (08 Oct 2019 06:27) (17 - 18)  SpO2: 98% (08 Oct 2019 06:27) (97% - 99%)    I&O's Summary    06 Oct 2019 07:01  -  07 Oct 2019 07:00  --------------------------------------------------------  IN: 0 mL / OUT: 1725 mL / NET: -1725 mL    07 Oct 2019 07:01  -  08 Oct 2019 06:45  --------------------------------------------------------  IN: 0 mL / OUT: 1200 mL / NET: -1200 mL          PHYSICAL EXAM:   	GEN: Age appropriate, resting comfortably in bed, no acute distress, non toxic appearing, speaking in complete sentences.   	HEENT: Conjunctiva and sclera normal  	PULM: course breath sounds bilaterally, louder at the bases  	CV: RRR, S1S2, no MRG  	MSK: no stiffness or joint effusions  	Abdominal: Soft, nontender to palpation, non-distended, +BS  	Extremities: No edema or cyanosis  	NEURO: AAOx3  	Psych: normal affect, normal behavior  	Skin: No rashes, lesions      LABS:                                     9.8    9.44  )-----------( 435      ( 07 Oct 2019 06:41 )             29.8     10-07    131<L>  |  94<L>  |  9   ----------------------------<  95  3.3<L>   |  25  |  0.75    Ca    8.6      07 Oct 2019 06:41  Phos  2.0     10-07  Mg     1.6     10-07    TPro  6.1  /  Alb  2.5<L>  /  TBili  0.2  /  DBili  x   /  AST  17  /  ALT  8   /  AlkPhos  64  10-06      	CTAP:  	IMPRESSION:     	Moderate small bowel dilatation likely related to enteritis with   	differential diagnosis of incomplete or early small bowel obstruction.   	Suggest follow-up imaging to track passage of contrast.  Extensive pulmonary fibrosis      Care Discussed with Consultants/Other Providers: Yes. Surgery     RADIOLOGY & ADDITIONAL TESTS: Yes   (Imaging Personally Reviewed) Caryl Dalton MD  [facility] Medicine CMB  Pager: [phone] / [employer] D[place] [phone]     Patient is a 86y old  Male who presents with a chief complaint of abdominal pain.     SUBJECTIVE / OVERNIGHT EVENTS:  Patient seen and examined at bedside. No acute events overnight. Pt denies any N/V/D. Pt reports that his abdominal pain has resolved. Denies any chest pain or dyspnea.     Other Review of Systems Negative.    MEDICATIONS  (STANDING):  cholecalciferol 1000 Unit(s) Oral daily  ciprofloxacin   IVPB 400 milliGRAM(s) IV Intermittent every 12 hours  heparin  Injectable 5000 Unit(s) SubCutaneous every 8 hours  hydroxychloroquine 200 milliGRAM(s) Oral two times a day  influenza   Vaccine 0.5 milliLiter(s) IntraMuscular once  magnesium sulfate  IVPB 2 Gram(s) IV Intermittent once  metroNIDAZOLE  IVPB 500 milliGRAM(s) IV Intermittent every 8 hours  multivitamin 1 Tablet(s) Oral daily  pantoprazole    Tablet 40 milliGRAM(s) Oral before breakfast  potassium phosphate / sodium phosphate powder 1 Packet(s) Oral three times a day with meals  potassium phosphate / sodium phosphate powder 1 Packet(s) Oral three times a day with meals    MEDICATIONS  (PRN):      OBJECTIVE:    Vital Signs Last 24 Hrs  T(C): 36.3 (08 Oct 2019 06:27), Max: 37.1 (07 Oct 2019 21:50)  T(F): 97.4 (08 Oct 2019 06:27), Max: 98.7 (07 Oct 2019 21:50)  HR: 91 (08 Oct 2019 06:27) (91 - 99)  BP: 119/80 (08 Oct 2019 06:27) (112/74 - 119/80)  BP(mean): --  RR: 17 (08 Oct 2019 06:27) (17 - 18)  SpO2: 98% (08 Oct 2019 06:27) (97% - 99%)    I&O's Summary    06 Oct 2019 07:01  -  07 Oct 2019 07:00  --------------------------------------------------------  IN: 0 mL / OUT: 1725 mL / NET: -1725 mL    07 Oct 2019 07:01  -  08 Oct 2019 06:45  --------------------------------------------------------  IN: 0 mL / OUT: 1200 mL / NET: -1200 mL      PHYSICAL EXAM:   	GEN: Age appropriate, resting comfortably in bed, no acute distress, non toxic appearing, speaking in complete sentences.   	HEENT: Conjunctiva and sclera normal  	PULM: course breath sounds bilaterally, louder at the bases  	CV: RRR, S1S2, no MRG  	MSK: no stiffness or joint effusions  	Abdominal: Soft, nontender to palpation, non-distended, +BS  	Extremities: No edema or cyanosis  	NEURO: AAOx3  	Psych: normal affect, normal behavior  	Skin: No rashes, lesions      LABS:                                   10.4   9.73  )-----------( 414      ( 08 Oct 2019 05:51 )             33.1     10-08    131<L>  |  95<L>  |  7   ----------------------------<  84  3.9   |  24  |  0.75    Ca    8.5      08 Oct 2019 05:51  Phos  1.9     10-08  Mg     1.4     10-08      	CTAP:  	IMPRESSION:     	Moderate small bowel dilatation likely related to enteritis with   	differential diagnosis of incomplete or early small bowel obstruction.   	Suggest follow-up imaging to track passage of contrast.  Extensive pulmonary fibrosis      Care Discussed with Consultants/Other Providers: Yes. Surgery     RADIOLOGY & ADDITIONAL TESTS: Yes   (Imaging Personally Reviewed)

## 2019-10-08 NOTE — PROGRESS NOTE ADULT - ATTENDING COMMENTS
Pt was seen and examined at noon. Pt feels well, no complaints. eager to go home.   #Gastroenteritis: continue abx to complete the course. leukocytosis resolved. pt currently asymptomatic, benign abdominal exam, surgery following, no intervention at this time, tolerated diet  #hyponatremia: mild, asymptomatic   #RA: pt on plaquenil alone  dc planning. Time 40 min
Pt was seen and examined at noon.   #Gastroenteritis: continue abx, wbc improved, pt currently asymptomatic, benign abdominal exam, surgery following, no intervention at this time, advance diet as tolerates, f/u GI PCR  #hyponatremia: mild, asymptomatic   #RA: pt on plaquenil alone  d/w wife at bedside
no current complaints, no episodes of nausea/vomiting or diarrhea  afebrile  AAOx3, Soft NTND, +bs  WBC 10.55  #Gastroenteritis: continue abx, wbc improved, pt currently asymptomatic, benign abdominal exam, surgery following, no intervention at this time, advance diet as tolerates, f/u GI PCR  #hyponatremia: may be SIADH, trend Na, if < 130 will check urine lytes, osmo  #RA: med reconciliation performed, pt on plaquenil alone, tapered off of steroids

## 2019-10-08 NOTE — DISCHARGE NOTE NURSING/CASE MANAGEMENT/SOCIAL WORK - NSDCPNINST_GEN_ALL_CORE
patient alert and clinically stable. patient denies pain and discomfort. no diarrhea or acute episodes noted. flu vaccine and education provided. side effects of medication management provided. pt being discharged to home with written and verbal information provided.

## 2019-10-08 NOTE — PROGRESS NOTE ADULT - PROBLEM SELECTOR PLAN 4
-Na 131 in setting of decreased PO intake and hx of nausea, abdominal pain  -Likely euvolemic vs hypovolemic  - Na did not improve s/p fluids   - Likely SIADH in setting of nausea and pain. If Na < 130, will check lytes and do workup   - Pt asymptomatic   - Will continue to monitor BMP

## 2019-10-26 NOTE — ED ADULT TRIAGE NOTE - CHIEF COMPLAINT QUOTE
[facility] 
                                             (A Division of [address] [place] [address]) Follow-up Note Date of Admission: 10/8/2019     Date of Note:  10/26/2019 Summary:   
 
81 y/o  male HTN, AF, GERD, DJD s/p L TKR, Prostate CA, Hypothyroidism, h/o pancreatitis, ETOH consumption adm 10/8/2019 w/ chills, weakness, altered mental status. Has neck pain x 1 mo. Had R leg cellulitis after cat bite (not scratch) 1 mo PTA - resolved w/ abx. Then micturition syncope x 3, hurt knee, progressing generalized weakness, then 1 day PTA fever, chills adm w/ 100.5, WBC 29.5, 13% bands. No uti, pneumonia, unrevealing CTAP. Blcx + Pasteurella multocida 1 of 2. Rpt blcx 10/10 NGTD x 2.  Gallium scan 10/14: intense periprosthetic uptake L knee, distal femur. L knee synovial fluid 10/15- not s/o septic arthritis. Presume distal L femur OM. Meropenem, Vanc started 10/21 for ? HAP. Low grade fever 10/24 100.5 WBC normal. Meropenem changed to levofloxacin Interval History: 
 
Still very lethargic. Rousable but not speaking today. Opens mouth and yes on command. Afebrile Current Antimicrobials: Prior Antimicrobials Vanc 10/21 - 5  Levofloxacin 10/24 - 2 abx 10/8 - 18 Vancomycin 10/8 - 2  Cefepime, Levoflox 10/8 - 0 Meropenem 10/9 - 2 Ceftriaxone 10/11 - 10 Qciconuxi56/21 - 3  
  
  
Assessment: Plan:  
Aspiration 
- failed swallow study 10/24 
- s/p PEG 10/25 -> per others Low grade fever - 100.5 today - WBC normal ?drug fever -> continue Levofloxacin 750 mg x 2 more days 
-> continue Vancomycin 2 more days NEW R LL opacity 
- atelectasis vs infiltrate. 
- had isolated low grade fever 10/24 - afebrile now - MRSA screen + -> abx as above Left knee uptake on Ga scan - s/p US guided arthrocentesis 10/15 - negative for infection - presume distal L femur osteomyelitis -> Levofloxacin as above then c/o vomiting , diarrhea x 2 days , no abdominal pain -> then change back to Ceftriaxone to complete 6 weeks (target end date: 11/19/2019)  +/- subsequent po abx suppression  
-> PICC prior to discharge. (still has R IJ CVL) Pasteurella BSI 
- 1 of 2 blood cultures 10/8 + P multocida sens TMP-SMX, Ceftriaxone, Levofloxacin, PCN, TCN 
- No active cellulitis. R leg cellulitis following cat bite in early September resolved w/ TMP-SMX then Keflex 
- Recent TTE 9/27: neg vegetations - rpt blcx 10/10 NG x 2 
- Gallium scan 10/14: intense periprosthetic uptake L knee, distal femur. 
- L knee aspiration 10/15: 7,200 WBC, no crystals - not suggestive of septic arthritis - presuming source is L distal femoral OM 
- should be treated by now -> continue Levofloxacin as above Encephalopathy/ ETOH Withdrawal 
- worse since admission - ? Medication-related: has been receiving dilaudid q 4 hours and Ativan this am 
- Head CT 10/14: no acute intracranial abnormality 
- not related to infection which appears under control at this time 
- improving but still overly lethargic -> per primary team.   
Septic shock 
- due to above 
- improving. Vasopressors stopped 10/10.   
- resolved -> abx as above MACKENZIE 
- resolved Worsening alkaline phosphatase 
- from sepsis, ?bone infection / met (doubt) 
- improving H/o PCN allergy (rash)  
- tolerated keflex 
- tolerating Ceftriaxone Recent R leg cellulitis following cat bite (not scratch) R hip pain - s/p aspiration 10/9: 23 WBC, no crystals HTN    
AF    
GERD    
DJD s/p L TKR    
Prostate CA    
Hypothyroidism    
h/o pancreatitis    
ETOH consumption    
  
Microbiology: 
  
10/8      blcx Pasteurella multocida 1 of 2 
             urcx NG 
10/9      R hip asp gs no wbc, NOS, cx NG 
 
10/10 blcx NGTD x 2 
  
Lines / Catheters: RIJ CVL Patient Active Problem List  
Diagnosis Code  Malignant neoplasm of prostate ([facility] [address] C61  Hypertension I10  
 GERD (gastroesophageal reflux disease) K21.9  Glaucoma H40.9  Glaucoma H40.9  Dysphagia R13.10  Encounter for colonoscopy due to history of adenomatous colonic polyps Z12.11, Z86.010  
 Non-pressure chronic ulcer of right calf with fat layer exposed ([facility] [address] I47.045  Laceration of right lower leg with complication A62.520W  Edema R60.9  Pneumonia J18.9  Atrial fibrillation (HCC) I48.91  
 Hypothyroidism E03.9  Sepsis ([facility] [address] A41.9  Cellulitis L03.90  
 HTN (hypertension) F31  
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  
 dextrose 5% infusion  50 mL/hr IntraVENous CONTINUOUS  
 sodium chloride (NS) flush 5-40 mL  5-40 mL IntraVENous Q8H  
 sodium chloride (NS) flush 5-40 mL  5-40 mL IntraVENous PRN  
 apixaban (ELIQUIS) tablet 5 mg  5 mg Oral BID  vancomycin (VANCOCIN) 1,000 mg in 0.9% sodium chloride (MBP/ADV) 250 mL adv  1,000 mg IntraVENous Q18H  
 levoFLOXacin (LEVAQUIN) 750 mg in D5W IVPB  750 mg IntraVENous Q24H  VANCOMYCIN INFORMATION NOTE   Other Rx Dosing/Monitoring  docusate sodium (COLACE) capsule 100 mg  100 mg Oral BID PRN  
 Lactobacillus Acidoph & Bulgar [facility]STWO[facility]) tablet 2 Tab  2 Tab Oral BID  albuterol-ipratropium (DUO-NEB) 2.5 MG-0.5 MG/3 ML  3 mL Nebulization QID RT  
 albuterol (PROVENTIL VENTOLIN) nebulizer solution 1.25 mg  1.25 mg Nebulization Q4H PRN  
 melatonin tablet 6 mg  6 mg Oral QHS PRN  
 haloperidol lactate (HALDOL) injection 1 mg  1 mg IntraVENous Q8H PRN  
 furosemide (LASIX) tablet 40 mg  40 mg Oral DAILY  metoprolol tartrate (LOPRESSOR) tablet 12.5 mg  12.5 mg Per NG tube BID  thiamine mononitrate (B-1) tablet 100 mg  100 mg Oral DAILY  acetaminophen (TYLENOL) tablet 500 mg  500 mg Oral Q6H PRN  
 acetaminophen (TYLENOL) solution 325 mg  325 mg Per NG tube Q6H  
 insulin lispro (HUMALOG) injection   SubCUTAneous Q6H  
 glucose chewable tablet 16 g  16 g Oral PRN  
 glucagon (GLUCAGEN) injection 1 mg  1 mg IntraMUSCular PRN  
 dextrose 10 % infusion 125-250 mL  125-250 mL IntraVENous PRN  pantoprazole (PROTONIX) injection 40 mg  40 mg IntraVENous DAILY  folic acid (FOLVITE) tablet 1 mg  1 mg Oral DAILY  sodium chloride (NS) flush 5-40 mL  5-40 mL IntraVENous Q8H  
 desonide (TRIDESILON) 0.05 % cream - patient supplied  (Patient Supplied)   Topical BID  naloxone (NARCAN) injection 0.4 mg  0.4 mg IntraVENous PRN  
 sodium chloride (NS) flush 5-10 mL  5-10 mL IntraVENous PRN  
 levothyroxine (SYNTHROID) tablet 175 mcg  175 mcg Oral ACB Review of Systems - Negative except as in interval history Objective: 
Visit Vitals /76 (BP 1 Location: Left arm, BP Patient Position: At rest) Pulse 80 Temp 98.3 °F (36.8 °C) Resp 15 Ht 5' 8\" (1.727 m) Wt 86.2 kg (190 lb 1.6 oz) SpO2 100% BMI 28.90 kg/m² Temp (24hrs), Av.2 °F (36.8 °C), Min:97.7 °F (36.5 °C), Max:98.8 °F (37.1 °C) General: Well developed, well nourished 80 y.o.  male still lethargic, follows simple commands, no verbal ouput today HEENT: no scleral icterus, mucous membranes moist, pharynx normal without lesions, NGT still in place Neck: resistant to flexion in all directions but more flexible than previous Cardio:  irregularly irregular rhythm Lungs: rhonchi bilateral  
Abdomen: soft, non-tender. Bowel sounds normal. No masses, no organomegaly. PEG in place Extremities:  no redness or tenderness in the calves or thighs, no edema Lab results: 
 
Chemistry Recent Labs 10/26/19 0335 10/25/19 
0541 10/24/19 
0100 GLU 97 72* 138* * 136 136  
K 4.2 4.8 4.6  100 100 CO2 31 32 31 BUN 17 20* 19* CREA 0.65 0.62 0.73 CA 8.4* 8.0* 7.8* AGAP 4 4 5 BUCR 26* 32* 26* CBC w/ Diff Recent Labs 10/26/19 
0335 10/25/19 
0541 10/24/19 
0100 WBC 8.1 11.3 10.9 RBC 2.61* 2.35* 2.34* HGB 8.1* 7.2* 7.2* HCT 26.2* 23.3* 23.1*  
 503* 486* GRANS 69 67 65 LYMPH 17* 20* 20* EOS 3 2 2 Microbiology All Micro Results Procedure Component Value Units Date/Time CULTURE, BLOOD [[id number]] Collected:  10/21/19 1859 Order Status:  Completed Specimen:  Blood Updated:  10/26/19 0732 Special Requests: --     
  RIGHT 
RED PORT Culture result: NO GROWTH 5 DAYS     
 CULTURE, BLOOD [[id number]] Collected:  10/21/19 1925 Order Status:  Completed Specimen:  Blood Updated:  10/26/19 0732 Special Requests: NO SPECIAL REQUESTS Culture result: NO GROWTH 5 DAYS     
 MRSA SCREEN - PCR (NASAL) [[id number]] Collected:  10/23/19 1315 Order Status:  Canceled Specimen:  Nasal from Nares MRSA SCREEN - PCR (NASAL) [[id number]] Collected:  10/21/19 1830 Order Status:  Canceled Specimen:  Nasal   
 ANAEROBE ID REFLEX [[id number]] Collected:  10/15/19 1030 Order Status:  Completed Specimen:  MICROBIAL ISOLATE Updated:  10/20/19 1835 Source PENDING Result 1 Comment Comment: (NOTE) No anaerobic growth in 72 hours. Performed At: [address] [id number] Anh Anderson MD [id number] CULTURE, BODY FLUID Deborah Number [[id number]] Collected:  10/15/19 1030 Order Status:  Completed Specimen:  Synovial Fluid Updated:  10/20/19 0103 Special Requests: LEFT KNEE     
  GRAM STAIN FEW WBC'S     
   NO ORGANISMS SEEN Culture result: NO GROWTH 5 DAYS     
 CULTURE, BLOOD [[id number]] Collected:  10/10/19 5128 Order Status:  Completed Specimen:  Blood Updated:  10/16/19 0042 Special Requests: --     
  NO SPECIAL REQUESTS 
BLUE PORT Culture result: NO GROWTH 6 DAYS     
 CULTURE, BLOOD [[id number]] Collected:  10/10/19 1237 Order Status:  Completed Specimen:  Blood Updated:  10/16/19 0042 Special Requests: PERIPHERAL Culture result: NO GROWTH 6 DAYS     
 CULTURE, BLOOD [[id number]] Collected:  10/08/19 2234 Order Status:  Completed Specimen:  Blood from Miscellaneous sample Updated:  10/15/19 0754 Special Requests: NO SPECIAL REQUESTS Culture result: NO GROWTH 6 DAYS     
 CULTURE, BODY FLUID [[id number]] Collected:  10/09/19 1200 Order Status:  Completed Specimen:  Synovial Fluid Updated:  10/14/19 9384 Special Requests: HIP  
  GRAM STAIN NO WBC'S SEEN     
   NO ORGANISMS SEEN Culture result: NO GROWTH 5 DAYS     
 CULTURE, BLOOD [[id number]]  (Abnormal)  (Susceptibility) Collected:  10/08/19 1301 Order Status:  Completed Specimen:  Blood Updated:  10/11/19 0825 Special Requests: --     
  NO SPECIAL REQUESTS 
RIGHT 
ARM 
  
  GRAM STAIN    
  AEROBIC BOTTLE GRAM NEGATIVE RODS SMEAR CALLED TO AND CORRECTLY REPEATED BY: DESHAWN COSBY RN,ICU, ON 10/9/19 AT 0335 TO S Culture result:    
  AEROBIC BOTTLE PASTEURELLA MULTOCIDA CULTURE, URINE [[id number]] Collected:  10/08/19 1418 Order Status:  Completed Specimen:  Cath Urine Updated:  10/10/19 1016 Special Requests: NO SPECIAL REQUESTS Culture result: NO GROWTH 2 DAYS Padmaja Patton MD, [facility] Infectious Diseases 
[phone] 
10/26/2019  
9:02 AM

## 2019-10-27 ENCOUNTER — INPATIENT (INPATIENT)
Facility: HOSPITAL | Age: 84
LOS: 3 days | Discharge: HOME HEALTH | End: 2019-10-31
Attending: SURGERY | Admitting: SURGERY
Payer: MEDICARE

## 2019-10-27 VITALS
SYSTOLIC BLOOD PRESSURE: 103 MMHG | TEMPERATURE: 98 F | OXYGEN SATURATION: 97 % | DIASTOLIC BLOOD PRESSURE: 82 MMHG | HEART RATE: 105 BPM | RESPIRATION RATE: 17 BRPM

## 2019-10-27 DIAGNOSIS — Z98.890 OTHER SPECIFIED POSTPROCEDURAL STATES: Chronic | ICD-10-CM

## 2019-10-27 DIAGNOSIS — R11.2 NAUSEA WITH VOMITING, UNSPECIFIED: ICD-10-CM

## 2019-10-27 DIAGNOSIS — K56.50 INTESTINAL ADHESIONS [BANDS], UNSPECIFIED AS TO PARTIAL VERSUS COMPLETE OBSTRUCTION: ICD-10-CM

## 2019-10-27 PROBLEM — M06.9 RHEUMATOID ARTHRITIS, UNSPECIFIED: Chronic | Status: ACTIVE | Noted: 2019-10-05

## 2019-10-27 LAB
ALBUMIN SERPL ELPH-MCNC: 3.1 G/DL — LOW (ref 3.3–5)
ALBUMIN SERPL ELPH-MCNC: 3.5 G/DL — SIGNIFICANT CHANGE UP (ref 3.3–5)
ALP SERPL-CCNC: 62 U/L — SIGNIFICANT CHANGE UP (ref 40–120)
ALP SERPL-CCNC: 72 U/L — SIGNIFICANT CHANGE UP (ref 40–120)
ALT FLD-CCNC: 14 U/L — SIGNIFICANT CHANGE UP (ref 4–41)
ALT FLD-CCNC: 18 U/L — SIGNIFICANT CHANGE UP (ref 4–41)
ANION GAP SERPL CALC-SCNC: 13 MMO/L — SIGNIFICANT CHANGE UP (ref 7–14)
ANION GAP SERPL CALC-SCNC: 15 MMO/L — HIGH (ref 7–14)
APTT BLD: 30.7 SEC — SIGNIFICANT CHANGE UP (ref 27.5–36.3)
AST SERPL-CCNC: 20 U/L — SIGNIFICANT CHANGE UP (ref 4–40)
AST SERPL-CCNC: 25 U/L — SIGNIFICANT CHANGE UP (ref 4–40)
BASE EXCESS BLDV CALC-SCNC: 6.6 MMOL/L — SIGNIFICANT CHANGE UP
BASOPHILS # BLD AUTO: 0.02 K/UL — SIGNIFICANT CHANGE UP (ref 0–0.2)
BASOPHILS NFR BLD AUTO: 0.2 % — SIGNIFICANT CHANGE UP (ref 0–2)
BILIRUB SERPL-MCNC: 0.4 MG/DL — SIGNIFICANT CHANGE UP (ref 0.2–1.2)
BILIRUB SERPL-MCNC: 0.5 MG/DL — SIGNIFICANT CHANGE UP (ref 0.2–1.2)
BLD GP AB SCN SERPL QL: NEGATIVE — SIGNIFICANT CHANGE UP
BLOOD GAS VENOUS - CREATININE: 0.84 MG/DL — SIGNIFICANT CHANGE UP (ref 0.5–1.3)
BUN SERPL-MCNC: 20 MG/DL — SIGNIFICANT CHANGE UP (ref 7–23)
BUN SERPL-MCNC: 20 MG/DL — SIGNIFICANT CHANGE UP (ref 7–23)
CALCIUM SERPL-MCNC: 9.3 MG/DL — SIGNIFICANT CHANGE UP (ref 8.4–10.5)
CALCIUM SERPL-MCNC: 9.7 MG/DL — SIGNIFICANT CHANGE UP (ref 8.4–10.5)
CHLORIDE BLDV-SCNC: 94 MMOL/L — LOW (ref 96–108)
CHLORIDE SERPL-SCNC: 91 MMOL/L — LOW (ref 98–107)
CHLORIDE SERPL-SCNC: 94 MMOL/L — LOW (ref 98–107)
CO2 SERPL-SCNC: 28 MMOL/L — SIGNIFICANT CHANGE UP (ref 22–31)
CO2 SERPL-SCNC: 28 MMOL/L — SIGNIFICANT CHANGE UP (ref 22–31)
CREAT SERPL-MCNC: 0.88 MG/DL — SIGNIFICANT CHANGE UP (ref 0.5–1.3)
CREAT SERPL-MCNC: 0.91 MG/DL — SIGNIFICANT CHANGE UP (ref 0.5–1.3)
EOSINOPHIL # BLD AUTO: 0.05 K/UL — SIGNIFICANT CHANGE UP (ref 0–0.5)
EOSINOPHIL NFR BLD AUTO: 0.4 % — SIGNIFICANT CHANGE UP (ref 0–6)
GAS PNL BLDV: 134 MMOL/L — LOW (ref 136–146)
GLUCOSE BLDV-MCNC: 126 MG/DL — HIGH (ref 70–99)
GLUCOSE SERPL-MCNC: 128 MG/DL — HIGH (ref 70–99)
GLUCOSE SERPL-MCNC: 94 MG/DL — SIGNIFICANT CHANGE UP (ref 70–99)
HCO3 BLDV-SCNC: 29 MMOL/L — HIGH (ref 20–27)
HCT VFR BLD CALC: 31.7 % — LOW (ref 39–50)
HCT VFR BLD CALC: 35 % — LOW (ref 39–50)
HCT VFR BLDV CALC: 37 % — LOW (ref 39–51)
HGB BLD-MCNC: 10.2 G/DL — LOW (ref 13–17)
HGB BLD-MCNC: 11.6 G/DL — LOW (ref 13–17)
HGB BLDV-MCNC: 12 G/DL — LOW (ref 13–17)
IMM GRANULOCYTES NFR BLD AUTO: 0.4 % — SIGNIFICANT CHANGE UP (ref 0–1.5)
INR BLD: 1.18 — HIGH (ref 0.88–1.17)
LACTATE BLDV-MCNC: 1.3 MMOL/L — SIGNIFICANT CHANGE UP (ref 0.5–2)
LACTATE BLDV-MCNC: 2.6 MMOL/L — HIGH (ref 0.5–2)
LYMPHOCYTES # BLD AUTO: 0.6 K/UL — LOW (ref 1–3.3)
LYMPHOCYTES # BLD AUTO: 4.5 % — LOW (ref 13–44)
MAGNESIUM SERPL-MCNC: 1.6 MG/DL — SIGNIFICANT CHANGE UP (ref 1.6–2.6)
MCHC RBC-ENTMCNC: 28.7 PG — SIGNIFICANT CHANGE UP (ref 27–34)
MCHC RBC-ENTMCNC: 29.6 PG — SIGNIFICANT CHANGE UP (ref 27–34)
MCHC RBC-ENTMCNC: 32.2 % — SIGNIFICANT CHANGE UP (ref 32–36)
MCHC RBC-ENTMCNC: 33.1 % — SIGNIFICANT CHANGE UP (ref 32–36)
MCV RBC AUTO: 89 FL — SIGNIFICANT CHANGE UP (ref 80–100)
MCV RBC AUTO: 89.3 FL — SIGNIFICANT CHANGE UP (ref 80–100)
MONOCYTES # BLD AUTO: 0.68 K/UL — SIGNIFICANT CHANGE UP (ref 0–0.9)
MONOCYTES NFR BLD AUTO: 5.1 % — SIGNIFICANT CHANGE UP (ref 2–14)
NEUTROPHILS # BLD AUTO: 11.93 K/UL — HIGH (ref 1.8–7.4)
NEUTROPHILS NFR BLD AUTO: 89.4 % — HIGH (ref 43–77)
NRBC # FLD: 0 K/UL — SIGNIFICANT CHANGE UP (ref 0–0)
NRBC # FLD: 0 K/UL — SIGNIFICANT CHANGE UP (ref 0–0)
OB PNL STL: NEGATIVE — SIGNIFICANT CHANGE UP
PCO2 BLDV: 53 MMHG — HIGH (ref 41–51)
PH BLDV: 7.39 PH — SIGNIFICANT CHANGE UP (ref 7.32–7.43)
PHOSPHATE SERPL-MCNC: 3.7 MG/DL — SIGNIFICANT CHANGE UP (ref 2.5–4.5)
PLATELET # BLD AUTO: 346 K/UL — SIGNIFICANT CHANGE UP (ref 150–400)
PLATELET # BLD AUTO: 371 K/UL — SIGNIFICANT CHANGE UP (ref 150–400)
PMV BLD: 9.3 FL — SIGNIFICANT CHANGE UP (ref 7–13)
PMV BLD: 9.6 FL — SIGNIFICANT CHANGE UP (ref 7–13)
PO2 BLDV: 33 MMHG — LOW (ref 35–40)
POTASSIUM BLDV-SCNC: 3.8 MMOL/L — SIGNIFICANT CHANGE UP (ref 3.4–4.5)
POTASSIUM SERPL-MCNC: 3.7 MMOL/L — SIGNIFICANT CHANGE UP (ref 3.5–5.3)
POTASSIUM SERPL-MCNC: 4 MMOL/L — SIGNIFICANT CHANGE UP (ref 3.5–5.3)
POTASSIUM SERPL-SCNC: 3.7 MMOL/L — SIGNIFICANT CHANGE UP (ref 3.5–5.3)
POTASSIUM SERPL-SCNC: 4 MMOL/L — SIGNIFICANT CHANGE UP (ref 3.5–5.3)
PROT SERPL-MCNC: 7.2 G/DL — SIGNIFICANT CHANGE UP (ref 6–8.3)
PROT SERPL-MCNC: 7.9 G/DL — SIGNIFICANT CHANGE UP (ref 6–8.3)
PROTHROM AB SERPL-ACNC: 13.2 SEC — HIGH (ref 9.8–13.1)
RBC # BLD: 3.56 M/UL — LOW (ref 4.2–5.8)
RBC # BLD: 3.92 M/UL — LOW (ref 4.2–5.8)
RBC # FLD: 16.9 % — HIGH (ref 10.3–14.5)
RBC # FLD: 16.9 % — HIGH (ref 10.3–14.5)
RH IG SCN BLD-IMP: POSITIVE — SIGNIFICANT CHANGE UP
SAO2 % BLDV: 55.8 % — LOW (ref 60–85)
SODIUM SERPL-SCNC: 134 MMOL/L — LOW (ref 135–145)
SODIUM SERPL-SCNC: 135 MMOL/L — SIGNIFICANT CHANGE UP (ref 135–145)
WBC # BLD: 13.33 K/UL — HIGH (ref 3.8–10.5)
WBC # BLD: 8.95 K/UL — SIGNIFICANT CHANGE UP (ref 3.8–10.5)
WBC # FLD AUTO: 13.33 K/UL — HIGH (ref 3.8–10.5)
WBC # FLD AUTO: 8.95 K/UL — SIGNIFICANT CHANGE UP (ref 3.8–10.5)

## 2019-10-27 PROCEDURE — 74174 CTA ABD&PLVS W/CONTRAST: CPT | Mod: 26

## 2019-10-27 PROCEDURE — 71045 X-RAY EXAM CHEST 1 VIEW: CPT | Mod: 26

## 2019-10-27 RX ORDER — SODIUM CHLORIDE 9 MG/ML
1000 INJECTION, SOLUTION INTRAVENOUS ONCE
Refills: 0 | Status: COMPLETED | OUTPATIENT
Start: 2019-10-27 | End: 2019-10-27

## 2019-10-27 RX ORDER — LIDOCAINE 4 G/100G
10 CREAM TOPICAL ONCE
Refills: 0 | Status: DISCONTINUED | OUTPATIENT
Start: 2019-10-27 | End: 2019-10-27

## 2019-10-27 RX ORDER — FAMOTIDINE 10 MG/ML
20 INJECTION INTRAVENOUS ONCE
Refills: 0 | Status: DISCONTINUED | OUTPATIENT
Start: 2019-10-27 | End: 2019-10-27

## 2019-10-27 RX ORDER — SODIUM CHLORIDE 9 MG/ML
1000 INJECTION, SOLUTION INTRAVENOUS
Refills: 0 | Status: DISCONTINUED | OUTPATIENT
Start: 2019-10-27 | End: 2019-10-28

## 2019-10-27 RX ORDER — ONDANSETRON 8 MG/1
4 TABLET, FILM COATED ORAL ONCE
Refills: 0 | Status: COMPLETED | OUTPATIENT
Start: 2019-10-27 | End: 2019-10-27

## 2019-10-27 RX ORDER — ENOXAPARIN SODIUM 100 MG/ML
40 INJECTION SUBCUTANEOUS DAILY
Refills: 0 | Status: DISCONTINUED | OUTPATIENT
Start: 2019-10-27 | End: 2019-10-31

## 2019-10-27 RX ORDER — SODIUM CHLORIDE 9 MG/ML
1000 INJECTION INTRAMUSCULAR; INTRAVENOUS; SUBCUTANEOUS ONCE
Refills: 0 | Status: COMPLETED | OUTPATIENT
Start: 2019-10-27 | End: 2019-10-27

## 2019-10-27 RX ADMIN — SODIUM CHLORIDE 1000 MILLILITER(S): 9 INJECTION INTRAMUSCULAR; INTRAVENOUS; SUBCUTANEOUS at 09:40

## 2019-10-27 RX ADMIN — ONDANSETRON 4 MILLIGRAM(S): 8 TABLET, FILM COATED ORAL at 08:40

## 2019-10-27 RX ADMIN — SODIUM CHLORIDE 1000 MILLILITER(S): 9 INJECTION, SOLUTION INTRAVENOUS at 15:14

## 2019-10-27 RX ADMIN — SODIUM CHLORIDE 1000 MILLILITER(S): 9 INJECTION INTRAMUSCULAR; INTRAVENOUS; SUBCUTANEOUS at 08:40

## 2019-10-27 NOTE — H&P ADULT - HISTORY OF PRESENT ILLNESS
86M with PMH HTN, Douglas's esophagus, rheumatoid arthritis who presented to the ER with 1 day of nausea and vomiting. Pt reports that the NBNB emesis started last night. No flatus or BM since yesterday mid afternoon. Pt reports no abdominal pain. Unable to tolerate a diet. No fever, chills, chest pain, SOB. Pt was recently admitted in June 2019 with a SBO.     Last colonoscopy was 3-4 years ago, but he thinks he does not need them anymore due to his age. 86M with PMH HTN, Douglas's esophagus, rheumatoid arthritis and PSH R hemicolectomy who presented to the ER with 1 day of nausea and vomiting. Pt reports that the NBNB emesis started last night. No flatus or BM since yesterday mid afternoon. Pt reports no abdominal pain. Unable to tolerate a diet. No fever, chills, chest pain, SOB. Pt was recently admitted in June 2019 with a SBO.     Last colonoscopy was 3-4 years ago, but he thinks he does not need them anymore due to his age. 86M with PMH HTN, Douglas's esophagus, rheumatoid arthritis and PSH R hemicolectomy for colon cancer who presented to the ER with 1 day of nausea and vomiting. Pt reports that the NBNB emesis started last night. No flatus or BM since yesterday mid afternoon. Pt reports no abdominal pain. Unable to tolerate a diet. No fever, chills, chest pain, SOB. Pt was recently admitted in June 2019 with a SBO.     Last colonoscopy was 3-4 years ago, but he thinks he does not need them anymore due to his age.

## 2019-10-27 NOTE — ED PROVIDER NOTE - PMH
Douglas esophagus    HTN (hypertension)    PVD (peripheral vascular disease)    Rheumatoid arthritis

## 2019-10-27 NOTE — ED ADULT NURSE NOTE - OBJECTIVE STATEMENT
Pt received in room 29, AA&oX4, ambulatory with a cane. Pt c/o vomiting x 5 episodes, reports vomiting is dark in color, denies blood. Pts last BM was reported to be yesterday, BM was dark in color, pt denies blood in BM. Vomiting started lasted night. Pt denies being nausea at present time. Pt denies CP, SOB, weakness, headache, fever, chills, abdominal pain, diarrhea at present time. 20G placed in LT AC, labs collected and sent. Will to continue to monitor.

## 2019-10-27 NOTE — ED PROVIDER NOTE - ATTENDING CONTRIBUTION TO CARE
85 y/o M hx multiple SBOs in the past and recent admission for enteritis presents with several episodes of dark/black vomiting and diarrhea last night. VSS. Abd NTTP. DDx gastroenteritis, varices, SBO. Plan labs, IVF, ct scan    Christoph: On evaluation, patient in no acute distress, no active abdominal pain, rectal exam without active melena/BRBPR, current plan for basic labs, imaging, pain management.     SHARA Hawthorne: I have personally performed a face to face bedside history and physical examination of this patient. I have discussed the history, examination, review of systems, assessment and plan of management with the resident. I have reviewed the electronic medical record and amended it to reflect my history, review of systems, physical exam, assessment and plan.

## 2019-10-27 NOTE — H&P ADULT - PROBLEM SELECTOR PLAN 1
-Admit to surgery  -NPO, IVF for resuscitation - Plan to give additional liter of LR in the ER  -Re-check labs at 8 hours (4 pm), including lactate  -OOB/Ambulate as tolerated  -NGT placed bedside (100 mL of output) - CXR to confirm placement  -SCDs, Lovenox for VTE prophylaxis  -Discussed with Dr. Jerry -Admit to surgery  -NPO, IVF for resuscitation - Plan to give additional liter of LR in the ER  -Re-check labs at 8 hours (4 pm), including lactate  -OOB/Ambulate as tolerated  -NGT placed bedside (100 mL of output) - CXR to confirm placement  -SCDs, Lovenox for VTE prophylaxis  -Pharmacy closed - will call tomorrow. [facility] [place] [phone]  -Discussed with Dr. Jerry -Admit to surgery  -NPO, IVF for resuscitation - Plan to give additional liter of LR in the ER  -Re-check labs at 8 hours (4 pm), including lactate  -OOB/Ambulate as tolerated  -NGT placed bedside (100 mL of output) - CXR to confirm placement  -SCDs, Lovenox for VTE prophylaxis  -Since this is the patient's 4th SBO in 2 years (as per family) - will obtain small bowel series tomorrow to evaluate level of obstruction  -Pharmacy closed - will call tomorrow. [facility] [place] [phone]  -Discussed with Dr. Jerry

## 2019-10-27 NOTE — ED PROVIDER NOTE - NS ED ROS FT
Gen: Denies fever, chills   CV: Denies chest pain, palpitations  Skin: Denies rash, erythema, color changes  Resp: Denies SOB, cough  GI: Denies abd pain, admits nausea, vomiting, diarrhea   Msk: Denies back pain, LE swelling, extremity pain  : Denies dysuria, increased frequency  Neuro: Denies LOC, weakness, seizures  Psych: Denies hx of psych

## 2019-10-27 NOTE — ED ADULT TRIAGE NOTE - CHIEF COMPLAINT QUOTE
Pt walk in with Relatives. c/o Nausea Vomiting Diarrhea since last night. reports weakness, Seen here few weeks ago with same complaints, Denies Fever Abdominal pain Dysuria Dizziness Headache SOB CP PMHx HTN Douglas Esophagus PVD RA

## 2019-10-27 NOTE — H&P ADULT - ASSESSMENT
86M with SBO likely secondary to adhesions. Concern for internal hernia since there are loops of small bowel twisted, but there does not appear to be swirling of the mesentry The loops of small bowel may just appear twisted due to dilation and adhesions.

## 2019-10-27 NOTE — H&P ADULT - NSHPLABSRESULTS_GEN_ALL_CORE
11.6   13.33 )-----------( 371      ( 27 Oct 2019 08:00 )             35.0       10-27    134<L>  |  91<L>  |  20  ----------------------------<  128<H>  4.0   |  28  |  0.91    Ca    9.7      27 Oct 2019 08:00    TPro  7.9  /  Alb  3.5  /  TBili  0.5  /  DBili  x   /  AST  25  /  ALT  18  /  AlkPhos  72  10-27                  PT/INR - ( 27 Oct 2019 08:00 )   PT: 13.2 SEC;   INR: 1.18          PTT - ( 27 Oct 2019 08:00 )  PTT:30.7 SEC          CAPILLARY BLOOD GLUCOSE              CT scan: SBO with transition point in anterior abdomen

## 2019-10-27 NOTE — ED ADULT NURSE REASSESSMENT NOTE - NS ED NURSE REASSESS COMMENT FT1
Pt A&Ox4, family at bedside, appears comfortable in bed, sleeping. Pt dx with small bowel obstruction and NG tube was placed by surgery team.  NG placed on continuous suction and output 600mL since placed, green in color. Pt tolerating well. NPO. Pt denies SOB, chest pian, nausea, chills, fever, vomiting, and headache at this time.  breathing equal and unlabored. VSS. Will continue to monitor.

## 2019-10-27 NOTE — H&P ADULT - NSHPPHYSICALEXAM_GEN_ALL_CORE
Vital Signs Last 24 Hrs  T(C): 36.8 (27 Oct 2019 12:57), Max: 36.8 (27 Oct 2019 12:57)  T(F): 98.2 (27 Oct 2019 12:57), Max: 98.2 (27 Oct 2019 12:57)  HR: 103 (27 Oct 2019 12:57) (101 - 105)  BP: 128/83 (27 Oct 2019 12:57) (103/82 - 128/83)  BP(mean): --  RR: 16 (27 Oct 2019 12:57) (16 - 17)  SpO2: 98% (27 Oct 2019 12:57) (97% - 98%)    General: well developed, well nourished, NAD  Neuro: alert and oriented, no focal deficits, moves all extremities spontaneously  HEENT: NCAT, EOMI, anicteric, mucosa moist  Respiratory: airway patent, respirations unlabored  CVS: regular rate and rhythm  Abdomen: soft, nontender, nondistended  Extremities: no edema, sensation and movement grossly intact  Skin: warm, dry, appropriate color

## 2019-10-27 NOTE — ED PROVIDER NOTE - OBJECTIVE STATEMENT
85 y/o M PMH HTN, barretts esophagus, PUD, s/p R hemicolectomy 2/2 colon ca 12 years ago with multiple SBOs (last episode May 2019) since that resolved nonoperatively presents to the ED with c/o 4 episodes of dark/black vomiting and 2 episodes of dark diarrhea onset last night. Denies fevers, chills, abdominal pain, chest pain, SOB. Pt last seen in the ED 2 weeks ago Oct 5 for vomiting. At that time, had hyponatremia, CT showing enteritis and pulmonary fibrosis, got cipro flagyl and discharged 3 days later Oct 8th.

## 2019-10-27 NOTE — ED ADULT NURSE REASSESSMENT NOTE - NS ED NURSE REASSESS COMMENT FT1
Received report from night ANDREI Unger.  Pt A&Ox4, ambulatory with cane, wife at bedside.  Pt presents to room 29 c/o nausea and dark vomit and diarrhea for the past couple of days.  Pt denies blood in stool and vomit.  LBM was last night and that's when he noticed the dark stool.  Pt last vomited at home and hasn't since he arrived to the ER. Pt is not actively throwing up. Pt denies nausea, chest pain, SOB, and headache at this time. Breathing equal and unlabored RR 18 and O2 saturation is 97% on room air.  Pt skin is intact, dry and warm. 20G Left AC placed, flushes without difficulty, no signs of phlebitis or infiltration noted at this time. Pt medicated and fluids running as per MD orders. EKG done. Awaiting CT results. Will continue to monitor. Received report from night ANDREI Unger.  Pt A&Ox4, speaks broken english, primarily Hebrew speaking wife and daughter at bedside to interpret, ambulatory with cane, wife at bedside.  Pt presents to room 29 c/o nausea and dark vomit and diarrhea for the past couple of days.  Pt denies blood in stool and vomit.  LBM was last night and that's when he noticed the dark stool.  Pt last vomited at home and hasn't since he arrived to the ER. Pt is not actively throwing up. Pt denies nausea, chest pain, SOB, and headache at this time. Breathing equal and unlabored RR 18 and O2 saturation is 97% on room air.  Pt skin is intact, dry and warm. 20G Left AC placed, flushes without difficulty, no signs of phlebitis or infiltration noted at this time. Pt medicated and fluids running as per MD orders. EKG done. Awaiting CT results. Will continue to monitor. Received report from night ANDREI Unger.  Pt A&Ox4, speaks broken english, primarily Lao speaking wife and daughter at bedside to interpret, ambulatory with walker, wife at bedside.  Pt presents to room 29 c/o nausea and dark vomit and diarrhea for the past couple of days.  Pt denies blood in stool and vomit.  LBM was last night and that's when he noticed the dark stool.  Pt last vomited at home and hasn't since he arrived to the ER. Pt is not actively throwing up. Pt denies nausea, chest pain, SOB, and headache at this time. Breathing equal and unlabored RR 18 and O2 saturation is 97% on room air.  Pt skin is intact, dry and warm. 20G Left AC placed, flushes without difficulty, no signs of phlebitis or infiltration noted at this time. Pt medicated and fluids running as per MD orders. EKG done. Awaiting CT results. Will continue to monitor. Received report from night ANDREI Unger.  Pt A&Ox4, speaks broken english, primarily Wolof speaking wife and daughter at bedside to interpret, ambulatory with walker, wife at bedside.  Pt presents to room 29 c/o nausea and dark vomit and diarrhea for the past couple of days.  Pt denies blood in stool and vomit.  LBM was last night and that's when he noticed the dark stool.  Pt last vomited at home and hasn't since he arrived to the ER. Pt is not actively throwing up. Pt denies nausea, chest pain, SOB, and headache at this time. Breathing equal and unlabored RR 18 and O2 saturation is 97% on room air.  Pt skin is intact, dry and warm. 20G Left forearm placed, flushes without difficulty, no signs of phlebitis or infiltration noted at this time. Pt medicated and fluids running as per MD orders. EKG done. Awaiting CT results. Will continue to monitor.

## 2019-10-27 NOTE — ED PROVIDER NOTE - CLINICAL SUMMARY MEDICAL DECISION MAKING FREE TEXT BOX
85 y/o M hx multiple SBOs in the past and recent admission for enteritis presents with several episodes of dark/black vomiting and diarrhea last night. VSS. Abd NTTP. DDx gastroenteritis, varices, SBO. Plan labs, IVF, ct scan

## 2019-10-27 NOTE — ED PROVIDER NOTE - PROGRESS NOTE DETAILS
Spoke to radiology re: Ct scan today. +dilated bowel, transition point in mid anterior abdomen, concern for internal hernia. Will contact surgery Spoke to surgery - will come see pt in the ED.

## 2019-10-28 LAB
ANION GAP SERPL CALC-SCNC: 11 MMO/L — SIGNIFICANT CHANGE UP (ref 7–14)
BUN SERPL-MCNC: 22 MG/DL — SIGNIFICANT CHANGE UP (ref 7–23)
CALCIUM SERPL-MCNC: 9.5 MG/DL — SIGNIFICANT CHANGE UP (ref 8.4–10.5)
CHLORIDE SERPL-SCNC: 92 MMOL/L — LOW (ref 98–107)
CO2 SERPL-SCNC: 32 MMOL/L — HIGH (ref 22–31)
CREAT SERPL-MCNC: 0.9 MG/DL — SIGNIFICANT CHANGE UP (ref 0.5–1.3)
GLUCOSE SERPL-MCNC: 80 MG/DL — SIGNIFICANT CHANGE UP (ref 70–99)
HCT VFR BLD CALC: 33.8 % — LOW (ref 39–50)
HGB BLD-MCNC: 10.7 G/DL — LOW (ref 13–17)
LACTATE BLDV-MCNC: 1.6 MMOL/L — SIGNIFICANT CHANGE UP (ref 0.5–2)
MAGNESIUM SERPL-MCNC: 1.7 MG/DL — SIGNIFICANT CHANGE UP (ref 1.6–2.6)
MCHC RBC-ENTMCNC: 29 PG — SIGNIFICANT CHANGE UP (ref 27–34)
MCHC RBC-ENTMCNC: 31.7 % — LOW (ref 32–36)
MCV RBC AUTO: 91.6 FL — SIGNIFICANT CHANGE UP (ref 80–100)
NRBC # FLD: 0 K/UL — SIGNIFICANT CHANGE UP (ref 0–0)
PHOSPHATE SERPL-MCNC: 3.4 MG/DL — SIGNIFICANT CHANGE UP (ref 2.5–4.5)
PLATELET # BLD AUTO: 342 K/UL — SIGNIFICANT CHANGE UP (ref 150–400)
PMV BLD: 10.2 FL — SIGNIFICANT CHANGE UP (ref 7–13)
POTASSIUM SERPL-MCNC: 3.6 MMOL/L — SIGNIFICANT CHANGE UP (ref 3.5–5.3)
POTASSIUM SERPL-SCNC: 3.6 MMOL/L — SIGNIFICANT CHANGE UP (ref 3.5–5.3)
RBC # BLD: 3.69 M/UL — LOW (ref 4.2–5.8)
RBC # FLD: 17 % — HIGH (ref 10.3–14.5)
SODIUM SERPL-SCNC: 135 MMOL/L — SIGNIFICANT CHANGE UP (ref 135–145)
WBC # BLD: 7.12 K/UL — SIGNIFICANT CHANGE UP (ref 3.8–10.5)
WBC # FLD AUTO: 7.12 K/UL — SIGNIFICANT CHANGE UP (ref 3.8–10.5)

## 2019-10-28 PROCEDURE — 93010 ELECTROCARDIOGRAM REPORT: CPT

## 2019-10-28 PROCEDURE — 99223 1ST HOSP IP/OBS HIGH 75: CPT

## 2019-10-28 PROCEDURE — 71045 X-RAY EXAM CHEST 1 VIEW: CPT | Mod: 26

## 2019-10-28 PROCEDURE — 74250 X-RAY XM SM INT 1CNTRST STD: CPT | Mod: 26

## 2019-10-28 RX ORDER — POTASSIUM CHLORIDE 20 MEQ
10 PACKET (EA) ORAL
Refills: 0 | Status: COMPLETED | OUTPATIENT
Start: 2019-10-28 | End: 2019-10-28

## 2019-10-28 RX ORDER — ONDANSETRON 8 MG/1
4 TABLET, FILM COATED ORAL EVERY 4 HOURS
Refills: 0 | Status: DISCONTINUED | OUTPATIENT
Start: 2019-10-28 | End: 2019-10-31

## 2019-10-28 RX ORDER — MAGNESIUM SULFATE 500 MG/ML
2 VIAL (ML) INJECTION ONCE
Refills: 0 | Status: COMPLETED | OUTPATIENT
Start: 2019-10-28 | End: 2019-10-28

## 2019-10-28 RX ORDER — SODIUM CHLORIDE 9 MG/ML
1000 INJECTION, SOLUTION INTRAVENOUS
Refills: 0 | Status: DISCONTINUED | OUTPATIENT
Start: 2019-10-28 | End: 2019-10-29

## 2019-10-28 RX ORDER — SODIUM CHLORIDE 9 MG/ML
1000 INJECTION, SOLUTION INTRAVENOUS ONCE
Refills: 0 | Status: COMPLETED | OUTPATIENT
Start: 2019-10-28 | End: 2019-10-28

## 2019-10-28 RX ADMIN — ONDANSETRON 4 MILLIGRAM(S): 8 TABLET, FILM COATED ORAL at 18:03

## 2019-10-28 RX ADMIN — SODIUM CHLORIDE 100 MILLILITER(S): 9 INJECTION, SOLUTION INTRAVENOUS at 11:04

## 2019-10-28 RX ADMIN — SODIUM CHLORIDE 100 MILLILITER(S): 9 INJECTION, SOLUTION INTRAVENOUS at 18:03

## 2019-10-28 RX ADMIN — Medication 100 MILLIEQUIVALENT(S): at 13:00

## 2019-10-28 RX ADMIN — ONDANSETRON 4 MILLIGRAM(S): 8 TABLET, FILM COATED ORAL at 11:04

## 2019-10-28 RX ADMIN — SODIUM CHLORIDE 1000 MILLILITER(S): 9 INJECTION, SOLUTION INTRAVENOUS at 07:12

## 2019-10-28 RX ADMIN — ENOXAPARIN SODIUM 40 MILLIGRAM(S): 100 INJECTION SUBCUTANEOUS at 16:17

## 2019-10-28 RX ADMIN — Medication 50 GRAM(S): at 07:12

## 2019-10-28 RX ADMIN — Medication 100 MILLIEQUIVALENT(S): at 15:15

## 2019-10-28 RX ADMIN — Medication 100 MILLIEQUIVALENT(S): at 01:26

## 2019-10-28 RX ADMIN — Medication 50 GRAM(S): at 16:34

## 2019-10-28 RX ADMIN — Medication 100 MILLIEQUIVALENT(S): at 11:13

## 2019-10-28 RX ADMIN — Medication 100 MILLIEQUIVALENT(S): at 05:33

## 2019-10-28 RX ADMIN — Medication 100 MILLIEQUIVALENT(S): at 03:34

## 2019-10-28 NOTE — CHART NOTE - NSCHARTNOTEFT_GEN_A_CORE
Called to bedside as intern having difficulty placing NGT after NGT fell out.  Patient  not nauseated or vomiting at this time. NGT met much resistance in the nasopharynx and passed however CXR revealed coiling.  NGT insertion re-attempted but painful to patient and continued to meet resistance. As patient uncomfortable, will defer placement at this time as not symptomatic.  Will perform serial exams overnight.     José R4 x [id number]

## 2019-10-28 NOTE — PROGRESS NOTE ADULT - SUBJECTIVE AND OBJECTIVE BOX
Surgery Team B Progress Note    Subjective  - Pt seen and examined on AM rounds  - Pt denies abdominal pain, chest pain, SOB  - recieved 1 bolus this AM for tachycardia to 103 w/ high NG output, low UOP  - Passing flatus, No BM      Objective  GEN: NAD, laying comfortably in bed  RESP: No increased WOB  Abd:  soft, mildly distended, NTTP.  Extr: WWP    Vital Signs  T(C): 36.5 (10-28-19 @ 05:31), Max: 37 (10-27-19 @ 16:25)  T(F): 97.7 (10-28-19 @ 05:31), Max: 98.6 (10-27-19 @ 16:25)  HR: 100 (10-28-19 @ 05:31) (98 - 103)  BP: 130/92 (10-28-19 @ 05:31) (122/96 - 139/90)  RR: 16 (10-28-19 @ 05:31) (16 - 18)  SpO2: 100% (10-28-19 @ 05:31) (97% - 100%)      27 Oct 2019 07:01  -  28 Oct 2019 07:00  --------------------------------------------------------  IN:    IV PiggyBack: 300 mL    Lactated Ringers IV Bolus: 1000 mL    lactated ringers.: 1500 mL  Total IN: 2800 mL    OUT:    Incontinent per Condom Catheter: 100 mL    Nasoenteral Tube: 1700 mL    Voided: 600 mL  Total OUT: 2400 mL    Total NET: 400 mL

## 2019-10-28 NOTE — PROGRESS NOTE ADULT - ASSESSMENT
86yoM w/ Hx of HTN, Elijah's esophagus, RA, a SBO in June 2019, and Hx of a R hemicolectomy for colon cancer who presented with 1 day of n/v  and no flatus or BM for 1 day duration admitted for a suspected SBO    Plan  NPO, IVF  NGT to suction  Small Bowel Series      Surgery B  [id number]

## 2019-10-28 NOTE — PROVIDER CONTACT NOTE (OTHER) - ASSESSMENT
NG tube to R nare clamped for bowel series, Patient was getting up from bed and accidently pulled NG tube out. Denies nausea at this time.

## 2019-10-29 LAB
ANION GAP SERPL CALC-SCNC: 10 MMO/L — SIGNIFICANT CHANGE UP (ref 7–14)
BUN SERPL-MCNC: 22 MG/DL — SIGNIFICANT CHANGE UP (ref 7–23)
CALCIUM SERPL-MCNC: 8.6 MG/DL — SIGNIFICANT CHANGE UP (ref 8.4–10.5)
CHLORIDE SERPL-SCNC: 95 MMOL/L — LOW (ref 98–107)
CO2 SERPL-SCNC: 29 MMOL/L — SIGNIFICANT CHANGE UP (ref 22–31)
CREAT SERPL-MCNC: 0.9 MG/DL — SIGNIFICANT CHANGE UP (ref 0.5–1.3)
GLUCOSE SERPL-MCNC: 105 MG/DL — HIGH (ref 70–99)
HCT VFR BLD CALC: 33.1 % — LOW (ref 39–50)
HGB BLD-MCNC: 10.3 G/DL — LOW (ref 13–17)
MAGNESIUM SERPL-MCNC: 2.2 MG/DL — SIGNIFICANT CHANGE UP (ref 1.6–2.6)
MCHC RBC-ENTMCNC: 28.3 PG — SIGNIFICANT CHANGE UP (ref 27–34)
MCHC RBC-ENTMCNC: 31.1 % — LOW (ref 32–36)
MCV RBC AUTO: 90.9 FL — SIGNIFICANT CHANGE UP (ref 80–100)
NRBC # FLD: 0 K/UL — SIGNIFICANT CHANGE UP (ref 0–0)
PHOSPHATE SERPL-MCNC: 2.8 MG/DL — SIGNIFICANT CHANGE UP (ref 2.5–4.5)
PLATELET # BLD AUTO: 307 K/UL — SIGNIFICANT CHANGE UP (ref 150–400)
PMV BLD: 9.8 FL — SIGNIFICANT CHANGE UP (ref 7–13)
POTASSIUM SERPL-MCNC: 3.7 MMOL/L — SIGNIFICANT CHANGE UP (ref 3.5–5.3)
POTASSIUM SERPL-SCNC: 3.7 MMOL/L — SIGNIFICANT CHANGE UP (ref 3.5–5.3)
RBC # BLD: 3.64 M/UL — LOW (ref 4.2–5.8)
RBC # FLD: 16.7 % — HIGH (ref 10.3–14.5)
SODIUM SERPL-SCNC: 134 MMOL/L — LOW (ref 135–145)
WBC # BLD: 7.98 K/UL — SIGNIFICANT CHANGE UP (ref 3.8–10.5)
WBC # FLD AUTO: 7.98 K/UL — SIGNIFICANT CHANGE UP (ref 3.8–10.5)

## 2019-10-29 PROCEDURE — 99231 SBSQ HOSP IP/OBS SF/LOW 25: CPT

## 2019-10-29 PROCEDURE — 74250 X-RAY XM SM INT 1CNTRST STD: CPT | Mod: 26

## 2019-10-29 RX ORDER — PANTOPRAZOLE SODIUM 20 MG/1
40 TABLET, DELAYED RELEASE ORAL DAILY
Refills: 0 | Status: DISCONTINUED | OUTPATIENT
Start: 2019-10-29 | End: 2019-10-29

## 2019-10-29 RX ORDER — SODIUM CHLORIDE 9 MG/ML
1000 INJECTION, SOLUTION INTRAVENOUS
Refills: 0 | Status: DISCONTINUED | OUTPATIENT
Start: 2019-10-29 | End: 2019-10-30

## 2019-10-29 RX ORDER — PANTOPRAZOLE SODIUM 20 MG/1
40 TABLET, DELAYED RELEASE ORAL
Refills: 0 | Status: DISCONTINUED | OUTPATIENT
Start: 2019-10-29 | End: 2019-10-31

## 2019-10-29 RX ORDER — POTASSIUM CHLORIDE 20 MEQ
10 PACKET (EA) ORAL
Refills: 0 | Status: COMPLETED | OUTPATIENT
Start: 2019-10-29 | End: 2019-10-29

## 2019-10-29 RX ADMIN — SODIUM CHLORIDE 75 MILLILITER(S): 9 INJECTION, SOLUTION INTRAVENOUS at 16:38

## 2019-10-29 RX ADMIN — Medication 100 MILLIEQUIVALENT(S): at 14:15

## 2019-10-29 RX ADMIN — Medication 100 MILLIEQUIVALENT(S): at 12:25

## 2019-10-29 RX ADMIN — Medication 63.75 MILLIMOLE(S): at 17:32

## 2019-10-29 RX ADMIN — ENOXAPARIN SODIUM 40 MILLIGRAM(S): 100 INJECTION SUBCUTANEOUS at 12:26

## 2019-10-29 RX ADMIN — Medication 100 MILLIEQUIVALENT(S): at 16:31

## 2019-10-29 RX ADMIN — PANTOPRAZOLE SODIUM 40 MILLIGRAM(S): 20 TABLET, DELAYED RELEASE ORAL at 20:34

## 2019-10-29 NOTE — PHYSICAL THERAPY INITIAL EVALUATION ADULT - PERTINENT HX OF CURRENT PROBLEM, REHAB EVAL
Patient is an 86 year old male who presented with nausea and vomiting. Admitted with SBO likely secondary to adhesions. PMH: HTN, Douglas's esophagus, rheumatoid arthritis and PSH right hemicolectomy for colon cancer

## 2019-10-29 NOTE — PROGRESS NOTE ADULT - ASSESSMENT
86yoM w/ Hx of HTN, Elijah's esophagus, RA, a SBO in June 2019, and Hx of a R hemicolectomy for colon cancer who presented with 1 day of n/v  and no flatus or BM for 1 day duration admitted for a suspected SBO    Plan  NPO, IVF  NGT to suction  Small Bowel Series      Surgery B  [id number] 86yoM w/ Hx of HTN, Elijah's esophagus, RA, a SBO in June 2019, and Hx of a R hemicolectomy for colon cancer who presented with 1 day of n/v  and no flatus or BM for 1 day duration admitted for a suspected SBO. NGT could not be replaced after multiple attempts resulting in epistaxis. Will monitor for nausea and abdominal distension.    Plan  NPO, IVF  Small Bowel Series      Surgery B  [id number]

## 2019-10-29 NOTE — PHYSICAL THERAPY INITIAL EVALUATION ADULT - ADDITIONAL COMMENTS
Patient lives in an apartment with 2 steps outside and an elevator to enter. Patient lives with wife and reports prior to admission, independence with all functional mobility with use of a rolling walker.     Patient left semisupine in bed, all lines intact and ANDREI Mcdonough aware.

## 2019-10-29 NOTE — PHYSICAL THERAPY INITIAL EVALUATION ADULT - GENERAL OBSERVATIONS, REHAB EVAL
Patient received semisupine in bed, +heplock, in no apparent distress. Wife present. Patient agreeable to participate in physical therapy evaluation.

## 2019-10-29 NOTE — PHYSICAL THERAPY INITIAL EVALUATION ADULT - PATIENT PROFILE REVIEW, REHAB EVAL
yes/PT orders received: Ambulate as tolerated. Consult with ANDREI Mcdonough, patient may participate in PT evaluation.

## 2019-10-29 NOTE — PROGRESS NOTE ADULT - SUBJECTIVE AND OBJECTIVE BOX
Surgery Team B Progress Note    Subjective  - Pt seen and examined on AM rounds  - Pt denies abdominal pain, chest pain, SOB  - NGT fell out last night, could not be replaced      Objective    Vital Signs Last 24 Hrs  T(C): 36.7 (29 Oct 2019 14:14), Max: 36.7 (29 Oct 2019 02:45)  T(F): 98.1 (29 Oct 2019 14:14), Max: 98.1 (29 Oct 2019 02:45)  HR: 76 (29 Oct 2019 14:14) (76 - 105)  BP: 109/72 (29 Oct 2019 14:14) (101/73 - 136/95)  BP(mean): --  RR: 16 (29 Oct 2019 14:14) (16 - 18)  SpO2: 100% (29 Oct 2019 14:14) (98% - 100%)          GEN: NAD, laying comfortably in bed  RESP: No increased WOB  Abd:  soft, mildly distended, NTTP.  Extr: WWP    Incontinent per Condom Catheter: 100 mL    Nasoenteral Tube: 1700 mL    Voided: 600 mL  Total OUT: 2400 mL    Total NET: 400 mL Surgery Team B Progress Note    Subjective  - Pt seen and examined on AM rounds  - Pt denies abdominal pain, chest pain, SOB  - NGT fell out last night, could not be replaced      Objective    Vital Signs Last 24 Hrs  T(C): 36.7 (29 Oct 2019 14:14), Max: 36.7 (29 Oct 2019 02:45)  T(F): 98.1 (29 Oct 2019 14:14), Max: 98.1 (29 Oct 2019 02:45)  HR: 76 (29 Oct 2019 14:14) (76 - 105)  BP: 109/72 (29 Oct 2019 14:14) (101/73 - 136/95)  BP(mean): --  RR: 16 (29 Oct 2019 14:14) (16 - 18)  SpO2: 100% (29 Oct 2019 14:14) (98% - 100%)    I&O's Detail    28 Oct 2019 07:01  -  29 Oct 2019 07:00  --------------------------------------------------------  IN:    dextrose 5% + sodium chloride 0.45%: 1600 mL    Lactated Ringers IV Bolus: 400 mL  Total IN: 2000 mL    OUT:    Nasoenteral Tube: 200 mL    Voided: 1300 mL  Total OUT: 1500 mL    Total NET: 500 mL      29 Oct 2019 07:01  -  29 Oct 2019 17:41  --------------------------------------------------------  IN:    dextrose 5% + sodium chloride 0.45%: 400 mL    dextrose 5% + sodium chloride 0.9%: 100 mL    IV PiggyBack: 200 mL  Total IN: 700 mL    OUT:    Voided: 250 mL  Total OUT: 250 mL    Total NET: 450 mL        GEN: NAD, laying comfortably in bed  RESP: No increased WOB  Abd:  soft, mildly distended, NTTP.  Extr: WWP

## 2019-10-29 NOTE — PROGRESS NOTE ADULT - ATTENDING COMMENTS
PT seen and examined.  Agree with resident eval and plan.  Pt passing flatus and soft abdomen.  Start clear liquid diet.

## 2019-10-30 ENCOUNTER — TRANSCRIPTION ENCOUNTER (OUTPATIENT)
Age: 84
End: 2019-10-30

## 2019-10-30 LAB
ANION GAP SERPL CALC-SCNC: 12 MMO/L — SIGNIFICANT CHANGE UP (ref 7–14)
BUN SERPL-MCNC: 12 MG/DL — SIGNIFICANT CHANGE UP (ref 7–23)
CALCIUM SERPL-MCNC: 8.8 MG/DL — SIGNIFICANT CHANGE UP (ref 8.4–10.5)
CHLORIDE SERPL-SCNC: 96 MMOL/L — LOW (ref 98–107)
CO2 SERPL-SCNC: 26 MMOL/L — SIGNIFICANT CHANGE UP (ref 22–31)
CREAT SERPL-MCNC: 0.78 MG/DL — SIGNIFICANT CHANGE UP (ref 0.5–1.3)
GLUCOSE SERPL-MCNC: 104 MG/DL — HIGH (ref 70–99)
HCT VFR BLD CALC: 36.3 % — LOW (ref 39–50)
HGB BLD-MCNC: 11.4 G/DL — LOW (ref 13–17)
MAGNESIUM SERPL-MCNC: 1.9 MG/DL — SIGNIFICANT CHANGE UP (ref 1.6–2.6)
MCHC RBC-ENTMCNC: 28.6 PG — SIGNIFICANT CHANGE UP (ref 27–34)
MCHC RBC-ENTMCNC: 31.4 % — LOW (ref 32–36)
MCV RBC AUTO: 91.2 FL — SIGNIFICANT CHANGE UP (ref 80–100)
NRBC # FLD: 0 K/UL — SIGNIFICANT CHANGE UP (ref 0–0)
PHOSPHATE SERPL-MCNC: 2.1 MG/DL — LOW (ref 2.5–4.5)
PLATELET # BLD AUTO: 325 K/UL — SIGNIFICANT CHANGE UP (ref 150–400)
PMV BLD: 9.8 FL — SIGNIFICANT CHANGE UP (ref 7–13)
POTASSIUM SERPL-MCNC: 3.5 MMOL/L — SIGNIFICANT CHANGE UP (ref 3.5–5.3)
POTASSIUM SERPL-SCNC: 3.5 MMOL/L — SIGNIFICANT CHANGE UP (ref 3.5–5.3)
RBC # BLD: 3.98 M/UL — LOW (ref 4.2–5.8)
RBC # FLD: 16.5 % — HIGH (ref 10.3–14.5)
SODIUM SERPL-SCNC: 134 MMOL/L — LOW (ref 135–145)
WBC # BLD: 7.16 K/UL — SIGNIFICANT CHANGE UP (ref 3.8–10.5)
WBC # FLD AUTO: 7.16 K/UL — SIGNIFICANT CHANGE UP (ref 3.8–10.5)

## 2019-10-30 PROCEDURE — 99231 SBSQ HOSP IP/OBS SF/LOW 25: CPT

## 2019-10-30 RX ORDER — SODIUM CHLORIDE 9 MG/ML
3 INJECTION INTRAMUSCULAR; INTRAVENOUS; SUBCUTANEOUS EVERY 8 HOURS
Refills: 0 | Status: DISCONTINUED | OUTPATIENT
Start: 2019-10-30 | End: 2019-10-31

## 2019-10-30 RX ORDER — SODIUM,POTASSIUM PHOSPHATES 278-250MG
1 POWDER IN PACKET (EA) ORAL ONCE
Refills: 0 | Status: COMPLETED | OUTPATIENT
Start: 2019-10-30 | End: 2019-10-30

## 2019-10-30 RX ORDER — POTASSIUM CHLORIDE 20 MEQ
40 PACKET (EA) ORAL ONCE
Refills: 0 | Status: COMPLETED | OUTPATIENT
Start: 2019-10-30 | End: 2019-10-30

## 2019-10-30 RX ORDER — MAGNESIUM SULFATE 500 MG/ML
1 VIAL (ML) INJECTION ONCE
Refills: 0 | Status: COMPLETED | OUTPATIENT
Start: 2019-10-30 | End: 2019-10-30

## 2019-10-30 RX ADMIN — SODIUM CHLORIDE 3 MILLILITER(S): 9 INJECTION INTRAMUSCULAR; INTRAVENOUS; SUBCUTANEOUS at 14:35

## 2019-10-30 RX ADMIN — Medication 40 MILLIEQUIVALENT(S): at 10:55

## 2019-10-30 RX ADMIN — Medication 100 GRAM(S): at 10:55

## 2019-10-30 RX ADMIN — SODIUM CHLORIDE 75 MILLILITER(S): 9 INJECTION, SOLUTION INTRAVENOUS at 00:00

## 2019-10-30 RX ADMIN — PANTOPRAZOLE SODIUM 40 MILLIGRAM(S): 20 TABLET, DELAYED RELEASE ORAL at 06:55

## 2019-10-30 RX ADMIN — ENOXAPARIN SODIUM 40 MILLIGRAM(S): 100 INJECTION SUBCUTANEOUS at 17:35

## 2019-10-30 RX ADMIN — Medication 1 PACKET(S): at 10:55

## 2019-10-30 RX ADMIN — SODIUM CHLORIDE 3 MILLILITER(S): 9 INJECTION INTRAMUSCULAR; INTRAVENOUS; SUBCUTANEOUS at 21:18

## 2019-10-30 NOTE — DISCHARGE NOTE PROVIDER - NSDCHC_MEDRECSTATUS_GEN_ALL_CORE
Admission Reconciliation is Not Complete  Discharge Reconciliation is Completed Admission Reconciliation is Not Complete  Discharge Reconciliation is Not Complete

## 2019-10-30 NOTE — DISCHARGE NOTE PROVIDER - HOSPITAL COURSE
86M with PMH HTN, Douglas's esophagus, rheumatoid arthritis and PSH R hemicolectomy for colon cancer who presented to the ER with 1 day of nausea and vomiting. Pt had a CT scan which showed SBO with transition point in anterior abdomen.  Pt was started on IVF and an NGT was placed for decompression. On 10/28 NGT dislodged and was unable to be replaced after multiple attempts. Pt had return of GI fxn.  On 10/29 pt was started on CLD . SB series which showed contrast in colon. During hospital course patients diet was slowly advanced as tolerated.  At this time, pt is tolerating a regular diet, ambulating and voiding.  Pt has been deemed stable for discharge at this time by attending. 86M with PMH HTN, Douglas's esophagus, rheumatoid arthritis and PSH R hemicolectomy for colon cancer who presented to the ER with 1 day of nausea and vomiting. Pt had a CT scan which showed SBO with transition point in anterior abdomen.  Pt was started on IVF and an NGT was placed for decompression. On 10/28 SB series performed but non diagnostic study. Pt accidently self removed NGT and team was not able to replace after multiple attempts. Pt had return of GI fxn.  On 10/29 SB series repeated and showed contrast in the colon.  Pt started on CLD and diet slowly advanced as tolerated. PT consulted and recommend be dc'd home w/home PT. Per Attending pt now stable for discharge home. Pt tolerating diet and pain well controlled. Pt to follow up with Dr Jerry as an outpatient, instructed to call to schedule appointment

## 2019-10-30 NOTE — DISCHARGE NOTE NURSING/CASE MANAGEMENT/SOCIAL WORK - PATIENT PORTAL LINK FT
You can access the FollowMyHealth Patient Portal offered by [facility] by registering at the following website: [email or web address]. By joining [employer]’s FollowMyHealth portal, you will also be able to view your health information using other applications (apps) compatible with our system.

## 2019-10-30 NOTE — DISCHARGE NOTE PROVIDER - NSDCCPCAREPLAN_GEN_ALL_CORE_FT
PRINCIPAL DISCHARGE DIAGNOSIS  Diagnosis: Small bowel obstruction due to adhesions  Assessment and Plan of Treatment: Small bowel obstruction due to adhesions

## 2019-10-30 NOTE — DISCHARGE NOTE PROVIDER - NSDCFUADDINST_GEN_ALL_CORE_FT
ACTIVITY: You may return to your usual level of physical activity. If you are taking narcotic pain medication DO NOT drive a car, operate machinery or make important decisions.    DIET: Return to your usual diet.    NOTIFY YOUR SURGEON IF: you have increased pain, any fever (over 100.4 F) persistent nausea/vomiting,    Please follow up with your primary care physician in 1-2 weeks regarding your hospitalization.    Please follow up with your surgeon, Dr. Jerry in 1-2 weeks. Please call office at [phone] and ask for Nishi who will schedule an appointment for you. ACTIVITY: You may return to your usual level of physical activity. If you are taking narcotic pain medication DO NOT drive a car, operate machinery or make important decisions.    DIET: Return to your usual diet.    NOTIFY YOUR SURGEON IF: you have increased pain, any fever (over 100.4 F) persistent nausea/vomiting,    Please follow up with your primary care physician in 1-2 weeks regarding your hospitalization and to have your labs re-drawn. Your sodium level was low while in the hospital, please have your PMD recheck     Please follow up with your surgeon, Dr. Jerry in 1-2 weeks. Please call office at [phone] and ask for Nishi who will schedule an appointment for you.

## 2019-10-30 NOTE — PROGRESS NOTE ADULT - SUBJECTIVE AND OBJECTIVE BOX
Surgery Team B Progress Note    Subjective  - Pt seen and examined on AM rounds  - Pt denies abdominal pain, chest pain, SOB  - Tolerated CLD yesterday       Objective    GEN: NAD, laying comfortably in bed  RESP: No increased WOB  Abd:  soft, mildly distended, NTTP.  Extr: WWP      Vital Signs  T(C): 36.4 (10-30-19 @ 05:26), Max: 37.1 (10-29-19 @ 18:22)  T(F): 97.5 (10-30-19 @ 05:26), Max: 98.7 (10-29-19 @ 18:22)  HR: 74 (10-30-19 @ 05:26) (74 - 86)  BP: 127/76 (10-30-19 @ 05:26) (106/73 - 127/81)  RR: 18 (10-30-19 @ 05:26) (16 - 18)  SpO2: 100% (10-30-19 @ 05:26) (97% - 100%)      29 Oct 2019 07:01  -  30 Oct 2019 07:00  --------------------------------------------------------  IN:    dextrose 5% + sodium chloride 0.45%: 400 mL    dextrose 5% + sodium chloride 0.9%: 1250 mL    IV PiggyBack: 550 mL  Total IN: 2200 mL    OUT:    Voided: 1250 mL  Total OUT: 1250 mL    Total NET: 950 mL

## 2019-10-30 NOTE — PROGRESS NOTE ADULT - ASSESSMENT
86yoM w/ Hx of HTN, Elijah's esophagus, RA, a SBO in June 2019, and Hx of a R hemicolectomy for colon cancer who presented with 1 day of n/v  and no flatus or BM for 1 day duration admitted for a suspected SBO. NGT could not be replaced after multiple attempts resulting in epistaxis. Will monitor for nausea and abdominal distension.    Plan  Tolerated CLD will ADAT  Serial abdominal exams  Possible d/c if tolerating diet      Surgery B  [id number]

## 2019-10-30 NOTE — DISCHARGE NOTE PROVIDER - NSDCMRMEDTOKEN_GEN_ALL_CORE_FT
esomeprazole 40 mg oral delayed release capsule: 1 cap(s) orally once a day  hydroxychloroquine 200 mg oral tablet: 1 tab(s) orally 2 times a day  Physical Therapy:   Vitamin D3 1000 intl units oral tablet: 1 tab(s) orally once a day

## 2019-10-30 NOTE — DISCHARGE NOTE NURSING/CASE MANAGEMENT/SOCIAL WORK - NSDCPNDISPN_GEN_ALL_CORE
Activities of daily living, including home environment that might     exacerbate pain or reduce effectiveness of the pain management plan of care as well as strategies to address these issues/Education provided on the pain management plan of care/Opioids not applicable/not prescribed

## 2019-10-30 NOTE — DISCHARGE NOTE PROVIDER - CARE PROVIDER_API CALL
Roly Jerry)  Surgery  [address] [place] [address]  Phone: [phone]  Fax: [phone]  Follow Up Time: 2 weeks

## 2019-10-31 VITALS
SYSTOLIC BLOOD PRESSURE: 113 MMHG | RESPIRATION RATE: 17 BRPM | DIASTOLIC BLOOD PRESSURE: 69 MMHG | OXYGEN SATURATION: 99 % | HEART RATE: 76 BPM | TEMPERATURE: 98 F

## 2019-10-31 LAB
ANION GAP SERPL CALC-SCNC: 8 MMO/L — SIGNIFICANT CHANGE UP (ref 7–14)
BUN SERPL-MCNC: 11 MG/DL — SIGNIFICANT CHANGE UP (ref 7–23)
CALCIUM SERPL-MCNC: 8.7 MG/DL — SIGNIFICANT CHANGE UP (ref 8.4–10.5)
CHLORIDE SERPL-SCNC: 96 MMOL/L — LOW (ref 98–107)
CO2 SERPL-SCNC: 27 MMOL/L — SIGNIFICANT CHANGE UP (ref 22–31)
CREAT SERPL-MCNC: 0.82 MG/DL — SIGNIFICANT CHANGE UP (ref 0.5–1.3)
GLUCOSE SERPL-MCNC: 87 MG/DL — SIGNIFICANT CHANGE UP (ref 70–99)
HCT VFR BLD CALC: 33.6 % — LOW (ref 39–50)
HGB BLD-MCNC: 11 G/DL — LOW (ref 13–17)
MAGNESIUM SERPL-MCNC: 1.9 MG/DL — SIGNIFICANT CHANGE UP (ref 1.6–2.6)
MCHC RBC-ENTMCNC: 29.6 PG — SIGNIFICANT CHANGE UP (ref 27–34)
MCHC RBC-ENTMCNC: 32.7 % — SIGNIFICANT CHANGE UP (ref 32–36)
MCV RBC AUTO: 90.3 FL — SIGNIFICANT CHANGE UP (ref 80–100)
NRBC # FLD: 0.03 K/UL — SIGNIFICANT CHANGE UP (ref 0–0)
PHOSPHATE SERPL-MCNC: 2.1 MG/DL — LOW (ref 2.5–4.5)
PLATELET # BLD AUTO: 293 K/UL — SIGNIFICANT CHANGE UP (ref 150–400)
PMV BLD: 10.1 FL — SIGNIFICANT CHANGE UP (ref 7–13)
POTASSIUM SERPL-MCNC: 3.8 MMOL/L — SIGNIFICANT CHANGE UP (ref 3.5–5.3)
POTASSIUM SERPL-SCNC: 3.8 MMOL/L — SIGNIFICANT CHANGE UP (ref 3.5–5.3)
RBC # BLD: 3.72 M/UL — LOW (ref 4.2–5.8)
RBC # FLD: 16.1 % — HIGH (ref 10.3–14.5)
SODIUM SERPL-SCNC: 131 MMOL/L — LOW (ref 135–145)
WBC # BLD: 6.81 K/UL — SIGNIFICANT CHANGE UP (ref 3.8–10.5)
WBC # FLD AUTO: 6.81 K/UL — SIGNIFICANT CHANGE UP (ref 3.8–10.5)

## 2019-10-31 RX ORDER — POTASSIUM CHLORIDE 20 MEQ
20 PACKET (EA) ORAL ONCE
Refills: 0 | Status: DISCONTINUED | OUTPATIENT
Start: 2019-10-31 | End: 2019-10-31

## 2019-10-31 RX ORDER — SODIUM,POTASSIUM PHOSPHATES 278-250MG
1 POWDER IN PACKET (EA) ORAL
Refills: 0 | Status: DISCONTINUED | OUTPATIENT
Start: 2019-10-31 | End: 2019-10-31

## 2019-10-31 RX ADMIN — PANTOPRAZOLE SODIUM 40 MILLIGRAM(S): 20 TABLET, DELAYED RELEASE ORAL at 06:54

## 2019-10-31 RX ADMIN — SODIUM CHLORIDE 3 MILLILITER(S): 9 INJECTION INTRAMUSCULAR; INTRAVENOUS; SUBCUTANEOUS at 06:02

## 2019-10-31 RX ADMIN — Medication 1 TABLET(S): at 10:19

## 2020-02-20 ENCOUNTER — INPATIENT (INPATIENT)
Facility: HOSPITAL | Age: 85
LOS: 7 days | Discharge: NURSING, REHAB OR LONG-TERM CARE | End: 2020-02-28
Attending: STUDENT IN AN ORGANIZED HEALTH CARE EDUCATION/TRAINING PROGRAM | Admitting: STUDENT IN AN ORGANIZED HEALTH CARE EDUCATION/TRAINING PROGRAM
Payer: MEDICARE

## 2020-02-20 VITALS
OXYGEN SATURATION: 100 % | SYSTOLIC BLOOD PRESSURE: 96 MMHG | TEMPERATURE: 98 F | DIASTOLIC BLOOD PRESSURE: 65 MMHG | HEART RATE: 119 BPM | RESPIRATION RATE: 18 BRPM

## 2020-02-20 DIAGNOSIS — Z98.890 OTHER SPECIFIED POSTPROCEDURAL STATES: Chronic | ICD-10-CM

## 2020-02-20 DIAGNOSIS — K56.609 UNSPECIFIED INTESTINAL OBSTRUCTION, UNSPECIFIED AS TO PARTIAL VERSUS COMPLETE OBSTRUCTION: ICD-10-CM

## 2020-02-20 LAB
B PERT DNA SPEC QL NAA+PROBE: NOT DETECTED — SIGNIFICANT CHANGE UP
BASE EXCESS BLDV CALC-SCNC: 9.1 MMOL/L — SIGNIFICANT CHANGE UP
BLOOD GAS VENOUS - CREATININE: 0.87 MG/DL — SIGNIFICANT CHANGE UP (ref 0.5–1.3)
BLOOD GAS VENOUS - FIO2: 21 — SIGNIFICANT CHANGE UP
C PNEUM DNA SPEC QL NAA+PROBE: NOT DETECTED — SIGNIFICANT CHANGE UP
CHLORIDE BLDV-SCNC: 92 MMOL/L — LOW (ref 96–108)
FLUAV H1 2009 PAND RNA SPEC QL NAA+PROBE: NOT DETECTED — SIGNIFICANT CHANGE UP
FLUAV H1 RNA SPEC QL NAA+PROBE: NOT DETECTED — SIGNIFICANT CHANGE UP
FLUAV H3 RNA SPEC QL NAA+PROBE: NOT DETECTED — SIGNIFICANT CHANGE UP
FLUAV SUBTYP SPEC NAA+PROBE: NOT DETECTED — SIGNIFICANT CHANGE UP
FLUBV RNA SPEC QL NAA+PROBE: NOT DETECTED — SIGNIFICANT CHANGE UP
GAS PNL BLDV: 132 MMOL/L — LOW (ref 136–146)
GLUCOSE BLDC GLUCOMTR-MCNC: 118 MG/DL — HIGH (ref 70–99)
GLUCOSE BLDV-MCNC: 149 MG/DL — HIGH (ref 70–99)
HADV DNA SPEC QL NAA+PROBE: NOT DETECTED — SIGNIFICANT CHANGE UP
HCO3 BLDV-SCNC: 31 MMOL/L — HIGH (ref 20–27)
HCOV PNL SPEC NAA+PROBE: SIGNIFICANT CHANGE UP
HCT VFR BLDV CALC: 35.1 % — LOW (ref 39–51)
HGB BLDV-MCNC: 11.4 G/DL — LOW (ref 13–17)
HMPV RNA SPEC QL NAA+PROBE: NOT DETECTED — SIGNIFICANT CHANGE UP
HPIV1 RNA SPEC QL NAA+PROBE: NOT DETECTED — SIGNIFICANT CHANGE UP
HPIV2 RNA SPEC QL NAA+PROBE: NOT DETECTED — SIGNIFICANT CHANGE UP
HPIV3 RNA SPEC QL NAA+PROBE: NOT DETECTED — SIGNIFICANT CHANGE UP
HPIV4 RNA SPEC QL NAA+PROBE: NOT DETECTED — SIGNIFICANT CHANGE UP
LACTATE BLDV-MCNC: 2 MMOL/L — SIGNIFICANT CHANGE UP (ref 0.5–2)
LACTATE SERPL-SCNC: 6.5 MMOL/L — CRITICAL HIGH (ref 0.5–2)
PCO2 BLDV: 50 MMHG — SIGNIFICANT CHANGE UP (ref 41–51)
PH BLDV: 7.44 PH — HIGH (ref 7.32–7.43)
PO2 BLDV: 31 MMHG — LOW (ref 35–40)
POTASSIUM BLDV-SCNC: 3.8 MMOL/L — SIGNIFICANT CHANGE UP (ref 3.4–4.5)
RSV RNA SPEC QL NAA+PROBE: NOT DETECTED — SIGNIFICANT CHANGE UP
RV+EV RNA SPEC QL NAA+PROBE: NOT DETECTED — SIGNIFICANT CHANGE UP
SAO2 % BLDV: 54.9 % — LOW (ref 60–85)

## 2020-02-20 PROCEDURE — 93010 ELECTROCARDIOGRAM REPORT: CPT

## 2020-02-20 PROCEDURE — 71045 X-RAY EXAM CHEST 1 VIEW: CPT | Mod: 26

## 2020-02-20 PROCEDURE — 74177 CT ABD & PELVIS W/CONTRAST: CPT | Mod: 26

## 2020-02-20 PROCEDURE — 71045 X-RAY EXAM CHEST 1 VIEW: CPT | Mod: 26,77

## 2020-02-20 RX ORDER — FUROSEMIDE 40 MG
1 TABLET ORAL
Qty: 0 | Refills: 0 | DISCHARGE

## 2020-02-20 RX ORDER — VANCOMYCIN HCL 1 G
1000 VIAL (EA) INTRAVENOUS EVERY 12 HOURS
Refills: 0 | Status: DISCONTINUED | OUTPATIENT
Start: 2020-02-20 | End: 2020-02-20

## 2020-02-20 RX ORDER — OMEGA-3 ACID ETHYL ESTERS 1 G
0 CAPSULE ORAL
Qty: 0 | Refills: 0 | DISCHARGE

## 2020-02-20 RX ORDER — ATORVASTATIN CALCIUM 80 MG/1
0 TABLET, FILM COATED ORAL
Qty: 0 | Refills: 0 | DISCHARGE

## 2020-02-20 RX ORDER — DOCUSATE SODIUM 100 MG
0 CAPSULE ORAL
Qty: 0 | Refills: 0 | DISCHARGE

## 2020-02-20 RX ORDER — SODIUM CHLORIDE 9 MG/ML
1000 INJECTION, SOLUTION INTRAVENOUS
Refills: 0 | Status: DISCONTINUED | OUTPATIENT
Start: 2020-02-20 | End: 2020-02-20

## 2020-02-20 RX ORDER — ACETAMINOPHEN 500 MG
650 TABLET ORAL ONCE
Refills: 0 | Status: COMPLETED | OUTPATIENT
Start: 2020-02-20 | End: 2020-02-20

## 2020-02-20 RX ORDER — PIPERACILLIN AND TAZOBACTAM 4; .5 G/20ML; G/20ML
3.38 INJECTION, POWDER, LYOPHILIZED, FOR SOLUTION INTRAVENOUS EVERY 8 HOURS
Refills: 0 | Status: DISCONTINUED | OUTPATIENT
Start: 2020-02-20 | End: 2020-02-21

## 2020-02-20 RX ORDER — ENOXAPARIN SODIUM 100 MG/ML
40 INJECTION SUBCUTANEOUS DAILY
Refills: 0 | Status: DISCONTINUED | OUTPATIENT
Start: 2020-02-20 | End: 2020-02-28

## 2020-02-20 RX ORDER — VANCOMYCIN HCL 1 G
1000 VIAL (EA) INTRAVENOUS ONCE
Refills: 0 | Status: COMPLETED | OUTPATIENT
Start: 2020-02-20 | End: 2020-02-20

## 2020-02-20 RX ORDER — SODIUM CHLORIDE 9 MG/ML
1000 INJECTION, SOLUTION INTRAVENOUS ONCE
Refills: 0 | Status: COMPLETED | OUTPATIENT
Start: 2020-02-20 | End: 2020-02-20

## 2020-02-20 RX ORDER — ASPIRIN/CALCIUM CARB/MAGNESIUM 324 MG
1 TABLET ORAL
Qty: 0 | Refills: 0 | DISCHARGE

## 2020-02-20 RX ORDER — PANTOPRAZOLE SODIUM 20 MG/1
40 TABLET, DELAYED RELEASE ORAL EVERY 24 HOURS
Refills: 0 | Status: DISCONTINUED | OUTPATIENT
Start: 2020-02-20 | End: 2020-02-24

## 2020-02-20 RX ORDER — ONDANSETRON 8 MG/1
4 TABLET, FILM COATED ORAL ONCE
Refills: 0 | Status: COMPLETED | OUTPATIENT
Start: 2020-02-20 | End: 2020-02-20

## 2020-02-20 RX ORDER — VANCOMYCIN HCL 1 G
1000 VIAL (EA) INTRAVENOUS EVERY 24 HOURS
Refills: 0 | Status: DISCONTINUED | OUTPATIENT
Start: 2020-02-20 | End: 2020-02-21

## 2020-02-20 RX ORDER — METHOTREXATE 2.5 MG/1
0 TABLET ORAL
Qty: 0 | Refills: 0 | DISCHARGE

## 2020-02-20 RX ORDER — PIPERACILLIN AND TAZOBACTAM 4; .5 G/20ML; G/20ML
3.38 INJECTION, POWDER, LYOPHILIZED, FOR SOLUTION INTRAVENOUS ONCE
Refills: 0 | Status: COMPLETED | OUTPATIENT
Start: 2020-02-20 | End: 2020-02-20

## 2020-02-20 RX ORDER — SODIUM CHLORIDE 9 MG/ML
1000 INJECTION, SOLUTION INTRAVENOUS
Refills: 0 | Status: DISCONTINUED | OUTPATIENT
Start: 2020-02-20 | End: 2020-02-23

## 2020-02-20 RX ADMIN — SODIUM CHLORIDE 1000 MILLILITER(S): 9 INJECTION, SOLUTION INTRAVENOUS at 16:19

## 2020-02-20 RX ADMIN — PIPERACILLIN AND TAZOBACTAM 3.38 GRAM(S): 4; .5 INJECTION, POWDER, LYOPHILIZED, FOR SOLUTION INTRAVENOUS at 16:19

## 2020-02-20 RX ADMIN — Medication 1000 MILLIGRAM(S): at 16:19

## 2020-02-20 RX ADMIN — SODIUM CHLORIDE 1000 MILLILITER(S): 9 INJECTION, SOLUTION INTRAVENOUS at 11:55

## 2020-02-20 RX ADMIN — SODIUM CHLORIDE 100 MILLILITER(S): 9 INJECTION, SOLUTION INTRAVENOUS at 16:29

## 2020-02-20 RX ADMIN — SODIUM CHLORIDE 100 MILLILITER(S): 9 INJECTION, SOLUTION INTRAVENOUS at 18:45

## 2020-02-20 RX ADMIN — PANTOPRAZOLE SODIUM 40 MILLIGRAM(S): 20 TABLET, DELAYED RELEASE ORAL at 16:30

## 2020-02-20 RX ADMIN — PIPERACILLIN AND TAZOBACTAM 25 GRAM(S): 4; .5 INJECTION, POWDER, LYOPHILIZED, FOR SOLUTION INTRAVENOUS at 18:44

## 2020-02-20 RX ADMIN — Medication 650 MILLIGRAM(S): at 12:55

## 2020-02-20 RX ADMIN — ENOXAPARIN SODIUM 40 MILLIGRAM(S): 100 INJECTION SUBCUTANEOUS at 18:44

## 2020-02-20 RX ADMIN — Medication 250 MILLIGRAM(S): at 09:29

## 2020-02-20 RX ADMIN — SODIUM CHLORIDE 1000 MILLILITER(S): 9 INJECTION, SOLUTION INTRAVENOUS at 09:05

## 2020-02-20 RX ADMIN — Medication 650 MILLIGRAM(S): at 09:29

## 2020-02-20 RX ADMIN — PIPERACILLIN AND TAZOBACTAM 200 GRAM(S): 4; .5 INJECTION, POWDER, LYOPHILIZED, FOR SOLUTION INTRAVENOUS at 09:05

## 2020-02-20 NOTE — CONSULT NOTE ADULT - ASSESSMENT
86M pmh colon CA s/ R hemicolectomy (12 years ago, [facility]), hernia repair multiple SBOs without surgical intervention, recent stay at [facility] about a month ago for SBO and discharge from rehab about 2 weeks ago now p/w nausea/vomiting noted to be tachycardic to 110s, SBP 90-110s, Lactate 4.1 and WBC: 19, had CT scan which demonstrated pulmonary fibrosis with probable left lower lobe pneumonia and small bowel obstruction with single transition point in the right lower quadrant. Surgery consulted for evaluation.    - Admit to Surgery, Dr. Lewis Nieto  - NPO/IVF/NGT  - Serial abdominal exams - no pain meds without exam  - Home meds  - Antibiotic therapy for pneumonia  - Gastrogaffin SB follow through tomorrow  - Obtain [facility] medical records  - Discussed with Dr. Gerson Cali, PGY2  General Surgery (B Team Surgery)  [id number] with any questions

## 2020-02-20 NOTE — PATIENT PROFILE ADULT - ABILITY TO HEAR (WITH HEARING AID OR HEARING APPLIANCE IF NORMALLY USED):
Mildly to Moderately Impaired: difficulty hearing in some environments or speaker may need to increase volume or speak distinctly/wears hearing aids - did not bring to the hospital

## 2020-02-20 NOTE — PROVIDER CONTACT NOTE (OTHER) - ASSESSMENT
Pt's HR is 116, /77, afebrile, RR 18, O2Sat 95% on room air. Pt denies pain and denies chest pain. D5 NS infusing at 100mL/hr.

## 2020-02-20 NOTE — H&P ADULT - HISTORY OF PRESENT ILLNESS
[facility] General Surgery Consultation     Patient is a 86y old  Male who presents with a chief complaint of nausea/vomiting.    HPI:  86M pmh colon CA s/ R hemicolectomy (12 years ago, [facility]), hernia repair multiple SBOs without surgical intervention, recent stay at [facility] about a month ago for SBO and discharge from rehab about 2 weeks ago now p/w nausea/vomiting. Per family and patient, he has has multiple episodes of nausea and vomiting since yesterday, denies bloating, reports last BM 2 days ago and was passing flatus until today AM. Denies fevers/chills, abdominal pain.  In ED, patient was noted to be tachycardic to 110s, SBP 90-110s, Lactate 4.1 and WBC: 19, had CT scan which demonstrated pulmonary fibrosis with probable left lower lobe pneumonia and small bowel obstruction with single transition point in the right lower quadrant. Surgery consulted for evaluation.        PAST MEDICAL & SURGICAL HISTORY:  Rheumatoid arthritis  PVD (peripheral vascular disease)  Douglas esophagus  HTN (hypertension)  H/O inguinal hernia repair: left  H/O right hemicolectomy      FAMILY HISTORY:  No pertinent family history in first degree relatives      SOCIAL HISTORY:    MEDICATIONS  (STANDING):  dextrose 5% + sodium chloride 0.9%. 1000 milliLiter(s) (50 mL/Hr) IV Continuous <Continuous>  enoxaparin Injectable 40 milliGRAM(s) SubCutaneous daily  ondansetron Injectable 4 milliGRAM(s) IV Push once    MEDICATIONS  (PRN):    Allergies    No Known Allergies    Intolerances        Vital Signs Last 24 Hrs  T(C): 37.8 (20 Feb 2020 11:16), Max: 38.8 (20 Feb 2020 09:30)  T(F): 100 (20 Feb 2020 11:16), Max: 101.8 (20 Feb 2020 09:30)  HR: 112 (20 Feb 2020 11:16) (112 - 119)  BP: 115/72 (20 Feb 2020 11:16) (96/65 - 115/72)  BP(mean): --  RR: 22 (20 Feb 2020 11:16) (18 - 24)  SpO2: 100% (20 Feb 2020 11:16) (95% - 100%)  Daily     Daily     General: NAD, well-nourished  HEENT: Atraumatic, EOMI  Resp: Breathing comfortably on RA  CV: Normal sinus rhythm  Abd: soft, ND, nontender, no RT/guarding  Ext: ROMIx4, motor strength intact x 4                          13.0   19.06 )-----------( 428      ( 20 Feb 2020 08:30 )             39.3     02-20    133<L>  |  88<L>  |  31<H>  ----------------------------<  143<H>  4.2   |  30  |  1.03    Ca    9.8      20 Feb 2020 08:30    TPro  7.8  /  Alb  3.5  /  TBili  0.5  /  DBili  x   /  AST  18  /  ALT  25  /  AlkPhos  78  02-20    PT/INR - ( 20 Feb 2020 08:30 )   PT: 14.3 SEC;   INR: 1.25          PTT - ( 20 Feb 2020 08:30 )  PTT:31.1 SEC      Radiographic Findings:         EXAM:  CT ABDOMEN AND PELVIS OC IC        PROCEDURE DATE:  Feb 20 2020         INTERPRETATION:  CLINICAL INFORMATION: Fever, vomiting    COMPARISON: CT abdomen pelvis 10/27/2019, CT abdomen 10/5/2019.    PROCEDURE:   CT of the Abdomen and Pelvis was performed with intravenous contrast.   Intravenous contrast: 90 ml Omnipaque 350. 10 ml discarded.  Oral contrast: positive contrast was administered.  Sagittal and coronal reformats were performed.    FINDINGS:    LOWER CHEST: Coronary arterial calcifications. Interlobular septal thickening with superimposed patchy opacities, increased compared to prior study from 10/27/2019 and 6/28/2017 consistent with pneumonia superimposed on pulmonary fibrosis grade 1 spondylolisthesis L4 and L5.    LIVER:Within normal limits.  BILE DUCTS: Normal caliber.  GALLBLADDER: Within normal limits.  SPLEEN: Within normal limits.  PANCREAS: Within normal limits.  ADRENALS: Within normal limits.  KIDNEYS/URETERS: Bilateral renal cysts. Bilateral subcentimeter hypodense foci too small to characterize. No hydronephrosis.    BLADDER: Within normal limits.  REPRODUCTIVE ORGANS: Hysterectomy.    BOWEL: Status post right hemicolectomy with ileocolic anastomosis. Dilated loops of proximal small bowel with a singletransition point in the right lower quadrant (2, 47).  PERITONEUM: No ascites.  VESSELS: Atherosclerotic changes.  RETROPERITONEUM/LYMPH NODES: No lymphadenopathy.    ABDOMINAL WALL: Within normal limits.  BONES: Degenerative changes.    IMPRESSION:     Pulmonary fibrosis with probable left lower lobe pneumonia  Small bowel obstruction with single transition point in the right lower quadrant.                  KRISTI MCKEON M.D., RADIOLOGY RESIDENT  This document has been electronically signed.  MERCEDEZ MYERS M.D., ATTENDING RADIOLOGIST  This document has been electronically signed. Feb 20 2020 11:31AM

## 2020-02-20 NOTE — ED ADULT NURSE NOTE - OBJECTIVE STATEMENT
pt c/o vomiting all night and dizziness--pt brought to rm 4 --pt taccycardic to 115--pt hot to touch-rectal temp 101.8 Pt alert and orientated x3,color pale--daughter and wife accompany pt. Pt c/o vomiting states he feels weak. Pt had #20 placed in rt forearm labs drawn and sent. Pt given antibiotics as prescribed.   LR hung infusing well. Pts skin intact, buttocks slightly red-no breakdown noted. Pt given gastroview to drink

## 2020-02-20 NOTE — CONSULT NOTE ADULT - SUBJECTIVE AND OBJECTIVE BOX
[facility] General Surgery Consultation     Patient is a 86y old  Male who presents with a chief complaint of nausea/vomiting.    HPI:  86M pmh colon CA s/ R hemicolectomy (12 years ago, [facility]), hernia repair multiple SBOs without surgical intervention, recent stay at [facility] about a month ago for SBO and discharge from rehab about 2 weeks ago now p/w nausea/vomiting. Per family and patient, he has has multiple episodes of nausea and vomiting since yesterday, denies bloating, reports last BM 2 days ago and was passing flatus until today AM. Denies fevers/chills, abdominal pain.  In ED, patient was noted to be tachycardic to 110s, SBP 90-110s, Lactate 4.1 and WBC: 19, had CT scan which demonstrated pulmonary fibrosis with probable left lower lobe pneumonia and small bowel obstruction with single transition point in the right lower quadrant. Surgery consulted for evaluation.        PAST MEDICAL & SURGICAL HISTORY:  Rheumatoid arthritis  PVD (peripheral vascular disease)  Douglas esophagus  HTN (hypertension)  H/O inguinal hernia repair: left  H/O right hemicolectomy      FAMILY HISTORY:  No pertinent family history in first degree relatives      SOCIAL HISTORY:    MEDICATIONS  (STANDING):  dextrose 5% + sodium chloride 0.9%. 1000 milliLiter(s) (50 mL/Hr) IV Continuous <Continuous>  enoxaparin Injectable 40 milliGRAM(s) SubCutaneous daily  ondansetron Injectable 4 milliGRAM(s) IV Push once    MEDICATIONS  (PRN):    Allergies    No Known Allergies    Intolerances        Vital Signs Last 24 Hrs  T(C): 37.8 (20 Feb 2020 11:16), Max: 38.8 (20 Feb 2020 09:30)  T(F): 100 (20 Feb 2020 11:16), Max: 101.8 (20 Feb 2020 09:30)  HR: 112 (20 Feb 2020 11:16) (112 - 119)  BP: 115/72 (20 Feb 2020 11:16) (96/65 - 115/72)  BP(mean): --  RR: 22 (20 Feb 2020 11:16) (18 - 24)  SpO2: 100% (20 Feb 2020 11:16) (95% - 100%)  Daily     Daily     General: NAD, well-nourished  HEENT: Atraumatic, EOMI  Resp: Breathing comfortably on RA  CV: Normal sinus rhythm  Abd: soft, ND, nontender, no RT/guarding  Ext: ROMIx4, motor strength intact x 4                          13.0   19.06 )-----------( 428      ( 20 Feb 2020 08:30 )             39.3     02-20    133<L>  |  88<L>  |  31<H>  ----------------------------<  143<H>  4.2   |  30  |  1.03    Ca    9.8      20 Feb 2020 08:30    TPro  7.8  /  Alb  3.5  /  TBili  0.5  /  DBili  x   /  AST  18  /  ALT  25  /  AlkPhos  78  02-20    PT/INR - ( 20 Feb 2020 08:30 )   PT: 14.3 SEC;   INR: 1.25          PTT - ( 20 Feb 2020 08:30 )  PTT:31.1 SEC      Radiographic Findings:         EXAM:  CT ABDOMEN AND PELVIS OC IC        PROCEDURE DATE:  Feb 20 2020         INTERPRETATION:  CLINICAL INFORMATION: Fever, vomiting    COMPARISON: CT abdomen pelvis 10/27/2019, CT abdomen 10/5/2019.    PROCEDURE:   CT of the Abdomen and Pelvis was performed with intravenous contrast.   Intravenous contrast: 90 ml Omnipaque 350. 10 ml discarded.  Oral contrast: positive contrast was administered.  Sagittal and coronal reformats were performed.    FINDINGS:    LOWER CHEST: Coronary arterial calcifications. Interlobular septal thickening with superimposed patchy opacities, increased compared to prior study from 10/27/2019 and 6/28/2017 consistent with pneumonia superimposed on pulmonary fibrosis grade 1 spondylolisthesis L4 and L5.    LIVER:Within normal limits.  BILE DUCTS: Normal caliber.  GALLBLADDER: Within normal limits.  SPLEEN: Within normal limits.  PANCREAS: Within normal limits.  ADRENALS: Within normal limits.  KIDNEYS/URETERS: Bilateral renal cysts. Bilateral subcentimeter hypodense foci too small to characterize. No hydronephrosis.    BLADDER: Within normal limits.  REPRODUCTIVE ORGANS: Hysterectomy.    BOWEL: Status post right hemicolectomy with ileocolic anastomosis. Dilated loops of proximal small bowel with a singletransition point in the right lower quadrant (2, 47).  PERITONEUM: No ascites.  VESSELS: Atherosclerotic changes.  RETROPERITONEUM/LYMPH NODES: No lymphadenopathy.    ABDOMINAL WALL: Within normal limits.  BONES: Degenerative changes.    IMPRESSION:     Pulmonary fibrosis with probable left lower lobe pneumonia  Small bowel obstruction with single transition point in the right lower quadrant.                  KRISTI MCKEON M.D., RADIOLOGY RESIDENT  This document has been electronically signed.  MERCEDEZ MYERS M.D., ATTENDING RADIOLOGIST  This document has been electronically signed. Feb 20 2020 11:31AM

## 2020-02-20 NOTE — ED PROVIDER NOTE - ATTENDING CONTRIBUTION TO CARE
86F p/w vomiting and dizziness.  Pt was at [facility] 1-2 months ago for SBO.  Pt has been having nausea and vomiting x 1 day this time.  Pt has been home x 2 weeks from rehab.  Febrile rectally 101.7 here.  Pt has an oxygen tank helps with his breathing at night.  PMHX CAD, lung issues, chronic SBO.  No diarrhea, (+)constip.  Last BM slight day before yest.  Reports still having flatus.  Had had surgery for colon CA, also hernia repair  Meds - nexium, ASA, hydroxychloroquine, prednisone 10mg, lasix 20mg twice weekly, zocor.  NKA.  Former smoker.  vomited between 5-10 times, nonbloody, watery.  No abd swelling.  Reports no abd pain.  Denies dysuria.  Denies dizziness at present.   Concern for SBO or intraabdominal infection, given fever will rx empiric abx, check labs, urine, CXR; given abd pain and ttp will check CT AP eval for SBO.  Rx pain med; despits (+)flatus pt may have SBO given recurrent episodes of this.  VS:  sepsis    GEN - mild distress abd pain, malaise;   A+O x3 Cachexia  HEAD - NC/AT     ENT - PEERL, EOMI, mucous membranes  dry , no discharge      NECK: Neck supple, non-tender without lymphadenopathy, no masses, no JVD  PULM - CTA b/l,  symmetric breath sounds  COR -  normal heart sounds    ABD - , mild distension, diffuse mild ttp soft,  BACK - no CVA tenderness, nontender spine     EXTREMS - no edema, no deformity, warm and well perfused    SKIN - no rash    or bruising      NEUROLOGIC - alert, face symmetric, speech fluent, sensation nl, motor generally weak but no focal deficit.

## 2020-02-20 NOTE — ED PROVIDER NOTE - PROGRESS NOTE DETAILS
Fara Suggs M.D. Resident: Discussed pt with surgery, will evaluate pt in ER Fara Suggs M.D. Resident: Patient's daughter refusing NGT from ER providers, requesting surgery to place NGT

## 2020-02-20 NOTE — ED PROVIDER NOTE - NS ED ROS FT
GENERAL: No fever or chills, EYES: no change in vision, HEENT: no trouble swallowing or speaking, CARDIAC: no chest pain, PULMONARY: no cough or SOB, GI: no abdominal pain, +N/V, no diarrhea or constipation, : No changes in urination, SKIN: no rashes, NEURO: no headache,  MSK: No joint pain ~Fara Suggs M.D. Resident

## 2020-02-20 NOTE — ED ADULT TRIAGE NOTE - CHIEF COMPLAINT QUOTE
Pt arrives for vomiting and dizziness since last night. Pt is hypotensive at this time and tachy at this time. Pt being brought back to room at this time. Pt is diabetic, finger stick not done r/t patient being brought back to room. Pt is no apparent distress at this time

## 2020-02-20 NOTE — ED PROVIDER NOTE - CLINICAL SUMMARY MEDICAL DECISION MAKING FREE TEXT BOX
87 yo p/w N/V x 1 day, appears dry, abd S/NT, pt febrile in triage, DDx: viral syndrome, SBO, UTI, will pursue sepsis work up - labs, CXR, UA + cx, CTAP, abx, IVF, reevaluate

## 2020-02-20 NOTE — H&P ADULT - ASSESSMENT
86M pmh colon CA s/ R hemicolectomy (12 years ago, [facility]), hernia repair multiple SBOs without surgical intervention, recent stay at [place] about a month ago for SBO and discharge from rehab about 2 weeks ago now p/w nausea/vomiting noted to be tachycardic to 110s, SBP 90-110s, Lactate 4.1 and WBC: 19, had CT scan which demonstrated pulmonary fibrosis with probable left lower lobe pneumonia and small bowel obstruction with single transition point in the right lower quadrant. Surgery consulted for evaluation.    - Admit to Surgery, Dr. Lewis Nieto  - NPO/IVF/NGT  - Serial abdominal exams - no pain meds without exam  - Home meds  - Antibiotic therapy for pneumonia  - Gastrogaffin SB follow through tomorrow  - Obtain [place] medical records  - Discussed with Dr. Gerson Cali, PGY2  General Surgery (B Team Surgery)  [id number] with any questions

## 2020-02-20 NOTE — ED ADULT NURSE NOTE - NSIMPLEMENTINTERV_GEN_ALL_ED
Implemented All Fall with Harm Risk Interventions:  [place] to call system. Call bell, personal items and telephone within reach. Instruct patient to call for assistance. Room bathroom lighting operational. Non-slip footwear when patient is off stretcher. Physically safe environment: no spills, clutter or unnecessary equipment. Stretcher in lowest position, wheels locked, appropriate side rails in place. Provide visual cue, wrist band, yellow gown, etc. Monitor gait and stability. Monitor for mental status changes and reorient to person, place, and time. Review medications for side effects contributing to fall risk. Reinforce activity limits and safety measures with patient and family. Provide visual clues: red socks.

## 2020-02-20 NOTE — ED PROVIDER NOTE - OBJECTIVE STATEMENT
85 yo M PMHx colon CA with resection, hernia repair, SBOs without surgical intervention, recent stay at [facility] about a month ago for SBO and DC from rehab about 2 weeks ago, p/w N/V. Pt with > 10 episodes of nonbloody vomiting since yesterday. + BM 2 days ago. Pt is still passing gas. No diarrhea, No fevers/chills, abd pain. No sick contacts. No recent travel. Pt ex smoker.

## 2020-02-20 NOTE — H&P ADULT - ATTENDING COMMENTS
Patient with multiple comorbidities including IPF, recent admissions for adhesive SBO treated nonoperatively at OSH p/w partial SBO. Patient on imaging no concerning signs of intestinal ischemia, exam is benign and abdomen is flat. Incidentally found to have worsening left lower lobe pneumonia in setting of recently diagnosed Intersitial pulmonary fibrosis.  Plan  treat for HCAP  npo, ngt  gastrografin challenge in AM  repeat lactate    I have personally interviewed and examined this patient, reviewed pertinent labs and imaging, and discussed the case with colleagues, residents, and physician assistants on B Team rounds.    The active care issues are:  1. sbo  2. pneumonia    The Acute Care Surgery (B Team) Attending Group Practice:  Dr. Judith Fox, Dr. Papito Wilson, Dr. Karin Vigil, Dr. Germain Grossman, Dr. Lewis Nieto, Dr. Cuco Potter    urgent issues - spectra [phone] or [phone]  nonurgent issues - [phone]  patient appointments or afterhours - [phone]

## 2020-02-20 NOTE — PATIENT PROFILE ADULT - VISION (WITH CORRECTIVE LENSES IF THE PATIENT USUALLY WEARS THEM):
Partially impaired: cannot see medication labels or newsprint, but can see obstacles in path, and the surrounding layout; can count fingers at arm's length/wears glassess

## 2020-02-20 NOTE — ED PROVIDER NOTE - PHYSICAL EXAMINATION
Gen: AAOx3, cachcectic  Head: NCAT  HEENT: EOMI, oral mucosa dry, normal conjunctiva  Lung: CTAB, no respiratory distress, no wheezes/rhonchi/rales B/L, speaking in full sentences  CV: RRR, no murmurs, rubs or gallops, no peripheral edema  Abd: soft, NTND, no guarding  MSK: no visible deformities  Neuro: No focal sensory or motor deficits  Skin: Warm, well perfused, no rash  Psych: normal affect.   ~Fara Suggs M.D. Resident

## 2020-02-21 LAB
ANION GAP SERPL CALC-SCNC: 16 MMO/L — HIGH (ref 7–14)
BUN SERPL-MCNC: 24 MG/DL — HIGH (ref 7–23)
CALCIUM SERPL-MCNC: 9.2 MG/DL — SIGNIFICANT CHANGE UP (ref 8.4–10.5)
CHLORIDE SERPL-SCNC: 92 MMOL/L — LOW (ref 98–107)
CO2 SERPL-SCNC: 28 MMOL/L — SIGNIFICANT CHANGE UP (ref 22–31)
CREAT SERPL-MCNC: 0.86 MG/DL — SIGNIFICANT CHANGE UP (ref 0.5–1.3)
GLUCOSE BLDC GLUCOMTR-MCNC: 109 MG/DL — HIGH (ref 70–99)
GLUCOSE BLDC GLUCOMTR-MCNC: 113 MG/DL — HIGH (ref 70–99)
GLUCOSE SERPL-MCNC: 121 MG/DL — HIGH (ref 70–99)
HCT VFR BLD CALC: 34.9 % — LOW (ref 39–50)
HGB BLD-MCNC: 11.6 G/DL — LOW (ref 13–17)
MAGNESIUM SERPL-MCNC: 1.7 MG/DL — SIGNIFICANT CHANGE UP (ref 1.6–2.6)
MCHC RBC-ENTMCNC: 31.6 PG — SIGNIFICANT CHANGE UP (ref 27–34)
MCHC RBC-ENTMCNC: 33.2 % — SIGNIFICANT CHANGE UP (ref 32–36)
MCV RBC AUTO: 95.1 FL — SIGNIFICANT CHANGE UP (ref 80–100)
NRBC # FLD: 0 K/UL — SIGNIFICANT CHANGE UP (ref 0–0)
PHOSPHATE SERPL-MCNC: 3 MG/DL — SIGNIFICANT CHANGE UP (ref 2.5–4.5)
PLATELET # BLD AUTO: 324 K/UL — SIGNIFICANT CHANGE UP (ref 150–400)
PMV BLD: 10.1 FL — SIGNIFICANT CHANGE UP (ref 7–13)
POTASSIUM SERPL-MCNC: 3.8 MMOL/L — SIGNIFICANT CHANGE UP (ref 3.5–5.3)
POTASSIUM SERPL-SCNC: 3.8 MMOL/L — SIGNIFICANT CHANGE UP (ref 3.5–5.3)
RBC # BLD: 3.67 M/UL — LOW (ref 4.2–5.8)
RBC # FLD: 15.4 % — HIGH (ref 10.3–14.5)
SODIUM SERPL-SCNC: 136 MMOL/L — SIGNIFICANT CHANGE UP (ref 135–145)
SPECIMEN SOURCE: SIGNIFICANT CHANGE UP
SPECIMEN SOURCE: SIGNIFICANT CHANGE UP
WBC # BLD: 12.8 K/UL — HIGH (ref 3.8–10.5)
WBC # FLD AUTO: 12.8 K/UL — HIGH (ref 3.8–10.5)

## 2020-02-21 PROCEDURE — 71045 X-RAY EXAM CHEST 1 VIEW: CPT | Mod: 26,76

## 2020-02-21 PROCEDURE — 99231 SBSQ HOSP IP/OBS SF/LOW 25: CPT | Mod: GC

## 2020-02-21 PROCEDURE — 99233 SBSQ HOSP IP/OBS HIGH 50: CPT

## 2020-02-21 RX ORDER — CEFTRIAXONE 500 MG/1
1000 INJECTION, POWDER, FOR SOLUTION INTRAMUSCULAR; INTRAVENOUS EVERY 24 HOURS
Refills: 0 | Status: COMPLETED | OUTPATIENT
Start: 2020-02-21 | End: 2020-02-26

## 2020-02-21 RX ORDER — MAGNESIUM SULFATE 500 MG/ML
2 VIAL (ML) INJECTION ONCE
Refills: 0 | Status: COMPLETED | OUTPATIENT
Start: 2020-02-21 | End: 2020-02-21

## 2020-02-21 RX ORDER — LANOLIN ALCOHOL/MO/W.PET/CERES
6 CREAM (GRAM) TOPICAL AT BEDTIME
Refills: 0 | Status: DISCONTINUED | OUTPATIENT
Start: 2020-02-21 | End: 2020-02-22

## 2020-02-21 RX ADMIN — PIPERACILLIN AND TAZOBACTAM 25 GRAM(S): 4; .5 INJECTION, POWDER, LYOPHILIZED, FOR SOLUTION INTRAVENOUS at 01:44

## 2020-02-21 RX ADMIN — Medication 50 GRAM(S): at 12:12

## 2020-02-21 RX ADMIN — CEFTRIAXONE 100 MILLIGRAM(S): 500 INJECTION, POWDER, FOR SOLUTION INTRAMUSCULAR; INTRAVENOUS at 12:12

## 2020-02-21 RX ADMIN — ENOXAPARIN SODIUM 40 MILLIGRAM(S): 100 INJECTION SUBCUTANEOUS at 12:12

## 2020-02-21 RX ADMIN — PANTOPRAZOLE SODIUM 40 MILLIGRAM(S): 20 TABLET, DELAYED RELEASE ORAL at 15:34

## 2020-02-21 NOTE — PROGRESS NOTE ADULT - SUBJECTIVE AND OBJECTIVE BOX
Surgery Daily Progress Note     87yo Male    --------------------------------------------------------------------------------------------------------------------  SUBJECTIVE / 24H EVENTS  Patient seen and examined on morning rounds. Pt pulled NGT out x3 in his sleep. Denies SOB, N/V, chest pain, abdominal pain.      OBJECTIVE:    VITAL SIGNS:  T(C): 36.7 (02-21-20 @ 06:00), Max: 38.8 (02-20-20 @ 09:30)  HR: 89 (02-21-20 @ 06:00) (89 - 116)  BP: 115/86 (02-21-20 @ 06:00) (98/65 - 118/83)  RR: 18 (02-21-20 @ 06:00) (17 - 24)  SpO2: 97% (02-21-20 @ 06:00) (95% - 100%)  Daily Height in cm: 180.34 (20 Feb 2020 17:40)    Daily   POCT Blood Glucose.: 109 mg/dL (02-21-20 @ 05:43)  POCT Blood Glucose.: 113 mg/dL (02-21-20 @ 02:09)  POCT Blood Glucose.: 118 mg/dL (02-20-20 @ 22:28)      PHYSICAL EXAM:  Gen: NAD  Resp: Respirations unlabored.  Card: RRR.  GI: Soft. Nontender. Nondistended (scaphoid)  Ext: Warm, well perfused      02-20-20 @ 07:01  -  02-21-20 @ 07:00  --------------------------------------------------------  IN:    dextrose 5% + sodium chloride 0.9%.: 1100 mL    IV PiggyBack: 200 mL  Total IN: 1300 mL    OUT:    Nasoenteral Tube: 1250 mL  Total OUT: 1250 mL    Total NET: 50 mL          LAB VALUES:  02-21    136  |  92<L>  |  24<H>  ----------------------------<  121<H>  3.8   |  28  |  0.86    Ca    9.2      21 Feb 2020 05:45  Phos  3.0     02-21  Mg     1.7     02-21    TPro  7.8  /  Alb  3.5  /  TBili  0.5  /  DBili  x   /  AST  18  /  ALT  25  /  AlkPhos  78  02-20                               11.6   12.80 )-----------( 324      ( 21 Feb 2020 05:45 )             34.9     LIVER FUNCTIONS - ( 20 Feb 2020 08:30 )  Alb: 3.5 g/dL / Pro: 7.8 g/dL / ALK PHOS: 78 u/L / ALT: 25 u/L / AST: 18 u/L / GGT: x           PT/INR - ( 20 Feb 2020 08:30 )   PT: 14.3 SEC;   INR: 1.25          PTT - ( 20 Feb 2020 08:30 )  PTT:31.1 SEC            MICROBIOLOGY:      RADIOLOGY:  PACS Image: Image(s) Available (02-21-20 @ 00:34)  PACS Image: Image(s) Available (02-20-20 @ 10:32)  PACS Image: Image(s) Available (02-20-20 @ 09:40)        MEDICATIONS  (STANDING):  cefTRIAXone   IVPB 1000 milliGRAM(s) IV Intermittent every 24 hours  dextrose 5% + sodium chloride 0.9%. 1000 milliLiter(s) (100 mL/Hr) IV Continuous <Continuous>  enoxaparin Injectable 40 milliGRAM(s) SubCutaneous daily  magnesium sulfate  IVPB 2 Gram(s) IV Intermittent once  pantoprazole  Injectable 40 milliGRAM(s) IV Push every 24 hours    MEDICATIONS  (PRN):

## 2020-02-21 NOTE — CHART NOTE - NSCHARTNOTEFT_GEN_A_CORE
CAPRINI SCORE [CLOT]    AGE RELATED RISK FACTORS                                                       MOBILITY RELATED FACTORS  [ ] Age 41-60 years                                            (1 Point)                  [ ] Bed rest                                                        (1 Point)  [ ] Age: 61-74 years                                           (2 Points)                 [ ] Plaster cast                                                   (2 Points)  [x] Age= 75 years                                              (3 Points)                 [ ] Bed bound for more than 72 hours                 (2 Points)    DISEASE RELATED RISK FACTORS                                               GENDER SPECIFIC FACTORS  [ ] Edema in the lower extremities                       (1 Point)                  [ ] Pregnancy                                                     (1 Point)  [ ] Varicose veins                                               (1 Point)                  [ ] Post-partum < 6 weeks                                   (1 Point)             [ ] BMI > 25 Kg/m2                                            (1 Point)                  [ ] Hormonal therapy  or oral contraception          (1 Point)                 [ ] Sepsis (in the previous month)                        (1 Point)                  [ ] History of pregnancy complications                 (1 point)  [ ] Pneumonia or serious lung disease                                               [ ] Unexplained or recurrent                     (1 Point)           (in the previous month)                               (1 Point)  [ ] Abnormal pulmonary function test                     (1 Point)                 SURGERY RELATED RISK FACTORS  [ ] Acute myocardial infarction                              (1 Point)                 [ ]  Section                                             (1 Point)  [ ] Congestive heart failure (in the previous month)  (1 Point)               [ ] Minor surgery                                                  (1 Point)   [ ] Inflammatory bowel disease                             (1 Point)                 [ ] Arthroscopic surgery                                        (2 Points)  [ ] Central venous access                                      (2 Points)                [ ] General surgery lasting more than 45 minutes   (2 Points)       [ ] Stroke (in the previous month)                          (5 Points)               [ ] Elective arthroplasty                                         (5 Points)                                                                                                                                               HEMATOLOGY RELATED FACTORS                                                 TRAUMA RELATED RISK FACTORS  [ ] Prior episodes of VTE                                     (3 Points)                [ ] Fracture of the hip, pelvis, or leg                       (5 Points)  [ ] Positive family history for VTE                         (3 Points)                 [ ] Acute spinal cord injury (in the previous month)  (5 Points)  [ ] Prothrombin 85062 A                                     (3 Points)                 [ ] Paralysis  (less than 1 month)                             (5 Points)  [ ] Factor V Leiden                                             (3 Points)                  [ ] Multiple Trauma within 1 month                        (5 Points)  [ ] Lupus anticoagulants                                     (3 Points)                                                           [ ] Anticardiolipin antibodies                               (3 Points)                                                       [ ] High homocysteine in the blood                      (3 Points)                                             [ ] Other congenital or acquired thrombophilia      (3 Points)                                                [ ] Heparin induced thrombocytopenia                  (3 Points)                                          Total Score [ 3 ]    Caprini Score 0 - 2:  Low Risk, No VTE Prophylaxis required for most patients, encourage ambulation  Caprini Score 3 - 6:  At Risk, pharmacologic VTE prophylaxis is indicated for most patients (in the absence of a contraindication)  Caprini Score Greater than or = 7:  High Risk, pharmacologic VTE prophylaxis is indicated for most patients (in the absence of a contraindication)

## 2020-02-21 NOTE — CONSULT NOTE ADULT - ATTENDING COMMENTS
86M pmh remote smoking, colon CA s/ R hemicolectomy (12 years ago), multiple SBOs and RA (on mtx and plaquenil as per wife) admitted w/ SBO. Pt also noted on abd imaging to have LLL consolidation and resp sx. Likely aspiration PNA in setting of his frequent vomiting. Agree with course of ctx. 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 Needs full CT chest imaging to properly evaluate. Would get this in 6 weeks to evaluate lung parenchyma after his current PNA has resolved. Given progression of his radiographic findings may need additional tx for his ILD or alternate RA regimen. Pt needs followup in pulmonary clinic either here or at [facility] (where his rheumatologist is).

## 2020-02-21 NOTE — PROGRESS NOTE ADULT - ASSESSMENT
86M pmh colon CA s/ R hemicolectomy (12 years ago, [facility]), hernia repair multiple SBOs without surgical intervention, recent stay at [place] about a month ago for SBO and discharge from rehab about 2 weeks ago now p/w nausea/vomiting noted to be tachycardic to 110s, SBP 90-110s, Lactate 4.1 and WBC: 19, had CT scan which demonstrated pulmonary fibrosis with probable left lower lobe pneumonia and small bowel obstruction with single transition point in the right lower quadrant. Surgery consulted for evaluation.    - advance to [facility]  - Serial abdominal exams - no pain meds without exam  - Home meds  - Antibiotic therapy for pneumonia- vanc zosyn given 2/20, changed to CTX 2/21  - Will hold off on NGT unless pt symptomatic. Will also obtain SB series if pt symptomatic  - 1-to-1 if NGT is placed  - cards/ pulm consults  - ECHO ordered    B Team Surgery  [id number]

## 2020-02-21 NOTE — PROGRESS NOTE ADULT - ATTENDING COMMENTS
I saw and examined the patient. I was physically present for the key portions of the evaluation and management (E/M) service provided.  I agree with the above history, physical, and plan which I have reviewed and edited where appropriate.      Germain Grossman MD  Acute and Critical Care Surgery    The Acute Care Surgery (B Team) Attending Group Practice:  Dr. Judith Fox, Dr. Papito Wilson, Dr. Germain Grossman, and Dr. Lewis Nieto    Urgent issues - spectra [phone] or [phone]  Nonurgent issues - [phone]  Patient appointments or after hours - [phone]

## 2020-02-21 NOTE — CONSULT NOTE ADULT - SUBJECTIVE AND OBJECTIVE BOX
Chief complaint: N/V    HPI:  86M with PMH of colon CA s/ R hemicolectomy (12 years ago), hernia repair, multiple SBOs without surgical intervention, and RA, presented yesterday with nausea/vomiting. Per family and patient, he has had multiple episodes of nausea and vomiting for 1 day, denies bloating, reports last BM 2 days prior to admission and was passing flatus until morning of admission. Denied fevers/chills, abdominal pain.  Also reported a nonproductive cough for two days. Denies SOB, chest pain, night sweats, recent weight loss.  Patient has history of RA on methotrexate and plaquinone, follows with rheumatologist at [facility]. Denies current joint pain. No known history of respiratory issues or prior hospitalizations for pneumonia.  Patient had a recent stay at [place] about a month ago for SBO and discharge from rehab about 2 weeks ago.   In ED, patient was noted to be tachycardic to 110s, SBP 90-110s, Lactate 4.1 and WBC: 19, had CT scan which demonstrated pulmonary fibrosis with probable left lower lobe pneumonia and small bowel obstruction with single transition point in the right lower quadrant. Surgery team managing SBO. Patient received vanco and zosyn X1, now on day 1 of ceftriaxone.  Patient is currently denying SOB, breathing comfortably on RA, denies fever or chills. Patient reports improvement in cough, which is still nonproductive.    PAST MEDICAL & SURGICAL HISTORY:  Rheumatoid arthritis  PVD (peripheral vascular disease)  Douglas esophagus  HTN (hypertension)  H/O inguinal hernia repair: left  H/O right hemicolectomy    FAMILY HISTORY:  No pertinent family history in first degree relatives      SOCIAL HISTORY:  2-3 pack per day smoker for 10 years, quit in his 30s. No alcohol or other drug use. 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 1000 milliLiter(s) (100 mL/Hr) IV Continuous <Continuous>  enoxaparin Injectable 40 milliGRAM(s) SubCutaneous daily  pantoprazole  Injectable 40 milliGRAM(s) IV Push every 24 hours    Allergies    No Known Allergies    Vital Signs Last 24 Hrs  T(C): 36.3 (21 Feb 2020 11:23), Max: 37.2 (20 Feb 2020 17:40)  T(F): 97.4 (21 Feb 2020 11:23), Max: 98.9 (20 Feb 2020 17:40)  HR: 102 (21 Feb 2020 11:23) (89 - 116)  BP: 111/77 (21 Feb 2020 11:23) (98/65 - 118/83)  BP(mean): --  RR: 18 (21 Feb 2020 11:23) (17 - 20)  SpO2: 97% (21 Feb 2020 11:23) (95% - 97%)      Physical Exam:  General – NAD, lying comfortably in bed, no supplemental O2  Neck – no lymphadenopathy  Lungs – Diffuse crackles throughout b/l lung fields, most prominent in left lower lobe. No wheezing. No accessory muscle use  Cardiac – Normal S1/S2, RRR, no murmurs  Abdomen – Soft, nontender, nondistended  Extremities – No clubbing, normal pulses, no peripheral edema. Hands showed swan neck deformities,                           11.6   12.80 )-----------( 324      ( 21 Feb 2020 05:45 )             34.9   02-21    136  |  92<L>  |  24<H>  ----------------------------<  121<H>  3.8   |  28  |  0.86    Ca    9.2      21 Feb 2020 05:45  Phos  3.0     02-21  Mg     1.7     02-21    TPro  7.8  /  Alb  3.5  /  TBili  0.5  /  DBili  x   /  AST  18  /  ALT  25  /  AlkPhos  78  02-20    PT/INR - ( 20 Feb 2020 08:30 )   PT: 14.3 SEC;   INR: 1.25        PTT - ( 20 Feb 2020 08:30 )  PTT:31.1 SEC        Radiographic Findings:     < from: CT Abdomen and Pelvis w/ Oral Cont and w/ IV Cont (02.20.20 @ 10:32) >  EXAM:  CT ABDOMEN AND PELVIS OC IC        PROCEDURE DATE:  Feb 20 2020       INTERPRETATION:  CLINICAL INFORMATION: Fever, vomiting    COMPARISON: CT abdomen pelvis 10/27/2019, CT abdomen 10/5/2019.    PROCEDURE:   CT of the Abdomen and Pelvis was performed with intravenous contrast.   Intravenous contrast: 90 ml Omnipaque 350. 10 ml discarded.  Oral contrast: positive contrast was administered.  Sagittal and coronal reformats were performed.    FINDINGS:    LOWER CHEST: Coronary arterial calcifications. Interlobular septal thickening with superimposed patchy opacities, increased compared to prior study from 10/27/2019 and 6/28/2017 consistent with pneumonia superimposed on pulmonary fibrosis grade 1 spondylolisthesis L4 and L5.    LIVER:Within normal limits.  BILE DUCTS: Normal caliber.  GALLBLADDER: Within normal limits.  SPLEEN: Within normal limits.  PANCREAS: Within normal limits.  ADRENALS: Within normal limits.  KIDNEYS/URETERS: Bilateral renal cysts. Bilateral subcentimeter hypodense foci too small to characterize. No hydronephrosis.    BLADDER: Within normal limits.  REPRODUCTIVE ORGANS: Hysterectomy.    BOWEL: Status post right hemicolectomy with ileocolic anastomosis. Dilated loops of proximal small bowel with a singletransition point in the right lower quadrant (2, 47).  PERITONEUM: No ascites.  VESSELS: Atherosclerotic changes.  RETROPERITONEUM/LYMPH NODES: No lymphadenopathy.    ABDOMINAL WALL: Within normal limits.  BONES: Degenerative changes.    IMPRESSION:     Pulmonary fibrosis with probable left lower lobe pneumonia  Small bowel obstruction with single transition point in the right lower quadrant.    < end of copied text >      < from: Xray Chest 1 View- PORTABLE-Urgent (02.21.20 @ 04:46) >  EXAM:  XR CHEST PORTABLE URGENT 1V        PROCEDURE DATE:  Feb 21 2020         INTERPRETATION:  Portable chest xray: NGT Placement    Comparison: Most recent prior     FINDINGS:    Lines/Tubes: In expected locations    Heart and mediastinum:  Unchanged in appearance.    Lungs, pleura, and airways: Bibasilar reticular opacities consistent with fibrosis.    Bones and soft tissues: The bones and soft tissues are unchanged.    Impression:    NG tube in satisfactory position    Unchanged bibasilar fibrosis    < end of copied text > Chief complaint: N/V    HPI:  86M with PMH of colon CA s/ R hemicolectomy (12 years ago), hernia repair, multiple SBOs without surgical intervention, and RA, presented yesterday with nausea/vomiting. Per family and patient, he has had multiple episodes of nausea and vomiting for 1 day, denies bloating, reports last BM 2 days prior to admission and was passing flatus until morning of admission. Denied fevers/chills, abdominal pain.  Also reported a nonproductive cough for two days. Denies SOB, chest pain, night sweats, recent weight loss.  Patient has history of RA on methotrexate and plaquinone, follows with rheumatologist at [facility]. Denies current joint pain. No known history of respiratory issues or prior hospitalizations for pneumonia.  Patient had a recent stay at [place] about a month ago for SBO and discharge from rehab about 2 weeks ago.   In ED, patient was noted to be tachycardic to 110s, SBP 90-110s, Lactate 4.1 and WBC: 19, had CT scan which demonstrated pulmonary fibrosis with probable left lower lobe pneumonia and small bowel obstruction with single transition point in the right lower quadrant. Surgery team managing SBO. Patient received vanco and zosyn X1, now on day 1 of ceftriaxone.  Patient is currently denying SOB, breathing comfortably on RA, denies fever or chills. Patient reports improvement in cough, which is still nonproductive.    PAST MEDICAL & SURGICAL HISTORY:  Rheumatoid arthritis  PVD (peripheral vascular disease)  Douglas esophagus  HTN (hypertension)  H/O inguinal hernia repair: left  H/O right hemicolectomy    FAMILY HISTORY:  No pertinent family history in first degree relatives      SOCIAL HISTORY:  2-3 pack per day smoker for 10 years, quit in his 30s. No alcohol or other drug use. 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 1000 milliLiter(s) (100 mL/Hr) IV Continuous <Continuous>  enoxaparin Injectable 40 milliGRAM(s) SubCutaneous daily  pantoprazole  Injectable 40 milliGRAM(s) IV Push every 24 hours    Allergies    No Known Allergies    Vital Signs Last 24 Hrs  T(C): 36.3 (21 Feb 2020 11:23), Max: 37.2 (20 Feb 2020 17:40)  T(F): 97.4 (21 Feb 2020 11:23), Max: 98.9 (20 Feb 2020 17:40)  HR: 102 (21 Feb 2020 11:23) (89 - 116)  BP: 111/77 (21 Feb 2020 11:23) (98/65 - 118/83)  BP(mean): --  RR: 18 (21 Feb 2020 11:23) (17 - 20)  SpO2: 97% (21 Feb 2020 11:23) (95% - 97%)      Physical Exam:  General – NAD, lying comfortably in bed, no supplemental O2  Neck – no lymphadenopathy  Lungs – Diffuse crackles b/l  lower lung fields, most prominent in left lower lobe. No wheezing. No accessory muscle use  Cardiac – Normal S1/S2, RRR, no murmurs  Abdomen – Soft, nontender, nondistended  Extremities – No clubbing, normal pulses, no peripheral edema. Hands showed swan neck deformities,                           11.6   12.80 )-----------( 324      ( 21 Feb 2020 05:45 )             34.9   02-21    136  |  92<L>  |  24<H>  ----------------------------<  121<H>  3.8   |  28  |  0.86    Ca    9.2      21 Feb 2020 05:45  Phos  3.0     02-21  Mg     1.7     02-21    TPro  7.8  /  Alb  3.5  /  TBili  0.5  /  DBili  x   /  AST  18  /  ALT  25  /  AlkPhos  78  02-20    PT/INR - ( 20 Feb 2020 08:30 )   PT: 14.3 SEC;   INR: 1.25        PTT - ( 20 Feb 2020 08:30 )  PTT:31.1 SEC        Radiographic Findings:     < from: CT Abdomen and Pelvis w/ Oral Cont and w/ IV Cont (02.20.20 @ 10:32) >  EXAM:  CT ABDOMEN AND PELVIS OC IC        PROCEDURE DATE:  Feb 20 2020       INTERPRETATION:  CLINICAL INFORMATION: Fever, vomiting    COMPARISON: CT abdomen pelvis 10/27/2019, CT abdomen 10/5/2019.    PROCEDURE:   CT of the Abdomen and Pelvis was performed with intravenous contrast.   Intravenous contrast: 90 ml Omnipaque 350. 10 ml discarded.  Oral contrast: positive contrast was administered.  Sagittal and coronal reformats were performed.    FINDINGS:    LOWER CHEST: Coronary arterial calcifications. Interlobular septal thickening with superimposed patchy opacities, increased compared to prior study from 10/27/2019 and 6/28/2017 consistent with pneumonia superimposed on pulmonary fibrosis grade 1 spondylolisthesis L4 and L5.    LIVER:Within normal limits.  BILE DUCTS: Normal caliber.  GALLBLADDER: Within normal limits.  SPLEEN: Within normal limits.  PANCREAS: Within normal limits.  ADRENALS: Within normal limits.  KIDNEYS/URETERS: Bilateral renal cysts. Bilateral subcentimeter hypodense foci too small to characterize. No hydronephrosis.    BLADDER: Within normal limits.  REPRODUCTIVE ORGANS: Hysterectomy.    BOWEL: Status post right hemicolectomy with ileocolic anastomosis. Dilated loops of proximal small bowel with a singletransition point in the right lower quadrant (2, 47).  PERITONEUM: No ascites.  VESSELS: Atherosclerotic changes.  RETROPERITONEUM/LYMPH NODES: No lymphadenopathy.    ABDOMINAL WALL: Within normal limits.  BONES: Degenerative changes.    IMPRESSION:     Pulmonary fibrosis with probable left lower lobe pneumonia  Small bowel obstruction with single transition point in the right lower quadrant.    < end of copied text >      < from: Xray Chest 1 View- PORTABLE-Urgent (02.21.20 @ 04:46) >  EXAM:  XR CHEST PORTABLE URGENT 1V        PROCEDURE DATE:  Feb 21 2020         INTERPRETATION:  Portable chest xray: NGT Placement    Comparison: Most recent prior     FINDINGS:    Lines/Tubes: In expected locations    Heart and mediastinum:  Unchanged in appearance.    Lungs, pleura, and airways: Bibasilar reticular opacities consistent with fibrosis.    Bones and soft tissues: The bones and soft tissues are unchanged.    Impression:    NG tube in satisfactory position    Unchanged bibasilar fibrosis    < end of copied text >

## 2020-02-21 NOTE — CONSULT NOTE ADULT - ASSESSMENT
86M pmh colon CA s/ R hemicolectomy (12 years ago), hernia repair, multiple SBOs (most recently 1 month ago at [place]), and RA admitted for SBO and found to have pulmonary fibrosis with probable left lower lobe pneumonia on abdominal CT. In ED, patient was noted to be tachycardic to 110s, SBP 90-110s, Lactate 4.1 and WBC: 19, received vanco and zosyn X1, now on day 1 of ceftfriaxone. RVP negative. Pt is now afebrile, +diffuse rales b/l, no respiratory distress, saturating well on RA.   Presentation consistent with pneumonia (aspiration pneumonia vs. CAP) superimposed on pulmonary fibrosis. Diffuse rales and CT findings consistent with progressive pulmonary fibrosis compared to prior studies, possible ILD secondary to RA. Radiographic evaluation limited to lung bases since only CT abdomen available (no CT chest).   Plan:  1. Pneumonia - Continue current antibiotic regimen (CTX)  2. Pulmonary fibrosis - Patient should receive outpatient CT chest and follow up with pulmonary clinic and his rheumatologist.

## 2020-02-21 NOTE — PHYSICAL THERAPY INITIAL EVALUATION ADULT - PERTINENT HX OF CURRENT PROBLEM, REHAB EVAL
Patient is an 86 year old presenting with nausea/vomiting. Noted to be tachycardic to 110s, SBP 90-110s, Lactate 4.1 and WBC: 19, had CT scan which demonstrated pulmonary fibrosis with probable left lower lobe pneumonia and small bowel obstruction with single transition point in the right lower quadrant. Surgery consulted for evaluation. PMH: colon cancer s/p right hemicolectomy (12 years ago, [facility]), hernia repair multiple SBOs without surgical intervention.

## 2020-02-21 NOTE — PHYSICAL THERAPY INITIAL EVALUATION ADULT - PATIENT PROFILE REVIEW, REHAB EVAL
yes/PT orders received: Ambulate as tolerated. Consult with ANDREI Marcos, patient may participate in PT evaluation.

## 2020-02-22 LAB
ANION GAP SERPL CALC-SCNC: 10 MMO/L — SIGNIFICANT CHANGE UP (ref 7–14)
ANION GAP SERPL CALC-SCNC: 11 MMO/L — SIGNIFICANT CHANGE UP (ref 7–14)
BUN SERPL-MCNC: 11 MG/DL — SIGNIFICANT CHANGE UP (ref 7–23)
BUN SERPL-MCNC: 9 MG/DL — SIGNIFICANT CHANGE UP (ref 7–23)
CALCIUM SERPL-MCNC: 8 MG/DL — LOW (ref 8.4–10.5)
CALCIUM SERPL-MCNC: 8.1 MG/DL — LOW (ref 8.4–10.5)
CHLORIDE SERPL-SCNC: 95 MMOL/L — LOW (ref 98–107)
CHLORIDE SERPL-SCNC: 97 MMOL/L — LOW (ref 98–107)
CO2 SERPL-SCNC: 25 MMOL/L — SIGNIFICANT CHANGE UP (ref 22–31)
CO2 SERPL-SCNC: 27 MMOL/L — SIGNIFICANT CHANGE UP (ref 22–31)
CREAT SERPL-MCNC: 0.62 MG/DL — SIGNIFICANT CHANGE UP (ref 0.5–1.3)
CREAT SERPL-MCNC: 0.63 MG/DL — SIGNIFICANT CHANGE UP (ref 0.5–1.3)
GLUCOSE SERPL-MCNC: 112 MG/DL — HIGH (ref 70–99)
GLUCOSE SERPL-MCNC: 96 MG/DL — SIGNIFICANT CHANGE UP (ref 70–99)
HCT VFR BLD CALC: 30 % — LOW (ref 39–50)
HGB BLD-MCNC: 10 G/DL — LOW (ref 13–17)
MAGNESIUM SERPL-MCNC: 2 MG/DL — SIGNIFICANT CHANGE UP (ref 1.6–2.6)
MCHC RBC-ENTMCNC: 31.1 PG — SIGNIFICANT CHANGE UP (ref 27–34)
MCHC RBC-ENTMCNC: 33.3 % — SIGNIFICANT CHANGE UP (ref 32–36)
MCV RBC AUTO: 93.2 FL — SIGNIFICANT CHANGE UP (ref 80–100)
NRBC # FLD: 0 K/UL — SIGNIFICANT CHANGE UP (ref 0–0)
PHOSPHATE SERPL-MCNC: 1.5 MG/DL — LOW (ref 2.5–4.5)
PLATELET # BLD AUTO: 242 K/UL — SIGNIFICANT CHANGE UP (ref 150–400)
PMV BLD: 9.6 FL — SIGNIFICANT CHANGE UP (ref 7–13)
POTASSIUM SERPL-MCNC: 2.7 MMOL/L — CRITICAL LOW (ref 3.5–5.3)
POTASSIUM SERPL-MCNC: 3.3 MMOL/L — LOW (ref 3.5–5.3)
POTASSIUM SERPL-SCNC: 2.7 MMOL/L — CRITICAL LOW (ref 3.5–5.3)
POTASSIUM SERPL-SCNC: 3.3 MMOL/L — LOW (ref 3.5–5.3)
RBC # BLD: 3.22 M/UL — LOW (ref 4.2–5.8)
RBC # FLD: 14.8 % — HIGH (ref 10.3–14.5)
SODIUM SERPL-SCNC: 132 MMOL/L — LOW (ref 135–145)
SODIUM SERPL-SCNC: 133 MMOL/L — LOW (ref 135–145)
WBC # BLD: 12.44 K/UL — HIGH (ref 3.8–10.5)
WBC # FLD AUTO: 12.44 K/UL — HIGH (ref 3.8–10.5)

## 2020-02-22 PROCEDURE — 99232 SBSQ HOSP IP/OBS MODERATE 35: CPT

## 2020-02-22 PROCEDURE — 93306 TTE W/DOPPLER COMPLETE: CPT | Mod: 26

## 2020-02-22 RX ORDER — POTASSIUM CHLORIDE 20 MEQ
10 PACKET (EA) ORAL
Refills: 0 | Status: COMPLETED | OUTPATIENT
Start: 2020-02-22 | End: 2020-02-22

## 2020-02-22 RX ORDER — LANOLIN ALCOHOL/MO/W.PET/CERES
3 CREAM (GRAM) TOPICAL AT BEDTIME
Refills: 0 | Status: DISCONTINUED | OUTPATIENT
Start: 2020-02-22 | End: 2020-02-28

## 2020-02-22 RX ORDER — POTASSIUM CHLORIDE 20 MEQ
20 PACKET (EA) ORAL
Refills: 0 | Status: DISCONTINUED | OUTPATIENT
Start: 2020-02-22 | End: 2020-02-22

## 2020-02-22 RX ADMIN — SODIUM CHLORIDE 100 MILLILITER(S): 9 INJECTION, SOLUTION INTRAVENOUS at 13:07

## 2020-02-22 RX ADMIN — PANTOPRAZOLE SODIUM 40 MILLIGRAM(S): 20 TABLET, DELAYED RELEASE ORAL at 16:15

## 2020-02-22 RX ADMIN — Medication 100 MILLIEQUIVALENT(S): at 12:55

## 2020-02-22 RX ADMIN — CEFTRIAXONE 100 MILLIGRAM(S): 500 INJECTION, POWDER, FOR SOLUTION INTRAMUSCULAR; INTRAVENOUS at 16:15

## 2020-02-22 RX ADMIN — Medication 100 MILLIEQUIVALENT(S): at 11:14

## 2020-02-22 RX ADMIN — Medication 100 MILLIEQUIVALENT(S): at 13:42

## 2020-02-22 RX ADMIN — ENOXAPARIN SODIUM 40 MILLIGRAM(S): 100 INJECTION SUBCUTANEOUS at 13:07

## 2020-02-22 RX ADMIN — Medication 3 MILLIGRAM(S): at 22:27

## 2020-02-22 RX ADMIN — Medication 100 MILLIEQUIVALENT(S): at 22:27

## 2020-02-22 RX ADMIN — SODIUM CHLORIDE 100 MILLILITER(S): 9 INJECTION, SOLUTION INTRAVENOUS at 22:27

## 2020-02-22 RX ADMIN — Medication 100 MILLIEQUIVALENT(S): at 22:29

## 2020-02-22 NOTE — PROGRESS NOTE ADULT - SUBJECTIVE AND OBJECTIVE BOX
Surgery Daily Progress Note     SUBJECTIVE / 24H EVENTS  Patient seen and examined on morning rounds. States he is passing flatus but hiccuping several times during exam. Not complaining of any abdominal pain. No N/V. Tolerating clears.       OBJECTIVE:  Vital Signs Last 24 Hrs  T(C): 36.6 (22 Feb 2020 10:07), Max: 37.4 (22 Feb 2020 02:00)  T(F): 97.9 (22 Feb 2020 10:07), Max: 99.3 (22 Feb 2020 02:00)  HR: 110 (22 Feb 2020 10:07) (75 - 111)  BP: 111/72 (22 Feb 2020 10:07) (111/72 - 140/85)  BP(mean): --  RR: 18 (22 Feb 2020 10:07) (17 - 18)  SpO2: 95% (22 Feb 2020 10:07) (95% - 98%)    PHYSICAL EXAM:  Gen: NAD  Resp: Respirations unlabored.  Card: RRR.  GI: Soft. Nontender. Nondistended (scaphoid)  Ext: Warm, well perfused    I&O's Summary    21 Feb 2020 07:01  -  22 Feb 2020 07:00  --------------------------------------------------------  IN: 1120 mL / OUT: 750 mL / NET: 370 mL    22 Feb 2020 07:01  -  22 Feb 2020 14:59  --------------------------------------------------------  IN: 0 mL / OUT: 300 mL / NET: -300 mL    CBC (02-22 @ 07:00)                              10.0<L>                         12.44<H>  )----------------(  242        --    % Neutrophils, --    % Lymphocytes, ANC: --                                  30.0<L>              CBC (02-21 @ 05:45)                              11.6<L>                         12.80<H>  )----------------(  324        --    % Neutrophils, --    % Lymphocytes, ANC: --                                  34.9<L>                BMP (02-22 @ 07:00)             132<L>  |  95<L>   |  11    		Ca++ --      Ca 8.0<L>             ---------------------------------( 96    		Mg 2.0                2.7<LL>  |  27      |  0.62  			Ph 1.5<L>  BMP (02-21 @ 05:45)             136     |  92<L>   |  24<H> 		Ca++ --      Ca 9.2                ---------------------------------( 121<H>		Mg 1.7                3.8     |  28      |  0.86  			Ph 3.0               -> BLOOD Culture (02-20 @ 11:23)     NG    NG  NG

## 2020-02-22 NOTE — DIETITIAN INITIAL EVALUATION ADULT. - PERTINENT MEDS FT
MEDICATIONS  (STANDING):  cefTRIAXone   IVPB 1000 milliGRAM(s) IV Intermittent every 24 hours  dextrose 5% + sodium chloride 0.9%. 1000 milliLiter(s) (100 mL/Hr) IV Continuous <Continuous>  enoxaparin Injectable 40 milliGRAM(s) SubCutaneous daily  melatonin 6 milliGRAM(s) Oral at bedtime  pantoprazole  Injectable 40 milliGRAM(s) IV Push every 24 hours    MEDICATIONS  (PRN):

## 2020-02-22 NOTE — DIETITIAN INITIAL EVALUATION ADULT. - PERTINENT LABORATORY DATA
02-22 Na132 mmol/L<L> Glu 96 mg/dL K+ 2.7 mmol/L<LL> Cr  0.62 mg/dL BUN 11 mg/dL 02-22 Phos 1.5 mg/dL<L> 02-20 Alb 3.5 g/dL

## 2020-02-22 NOTE — DIETITIAN INITIAL EVALUATION ADULT. - ADD RECOMMEND
1. Add Ensure Enlive 240mls 3x daily (1050kcal, 60g protein) as medically feasible. 2. Monitor weights, labs, BM's, skin integrity, diet advancement. 3. RD remains available, re-consult as needed.

## 2020-02-22 NOTE — CHART NOTE - NSCHARTNOTEFT_GEN_A_CORE
NUTRITION SERVICES     Upon Nutritional Assessment by the Registered Dietitian your patient was determined to meet criteria/ has evidence of the following diagnosis/diagnoses:  [ ] Mild Protein Calorie Malnutrition   [ ] Moderate Protein Calorie Malnutrition   [X] Severe Protein Calorie Malnutrition   [ ] Unspecified Protein Calorie Malnutrition   [X] Underweight / BMI <19  [ ] Morbid Obesity / BMI >40    Findings as based on:  •  Comprehensive nutritional assessment and consultation    Please refer to Initial Dietitian Evaluation via documents section of [facility] for further recommendations.    Erlin Arroyo RDN, CDN [id number]

## 2020-02-22 NOTE — DIETITIAN INITIAL EVALUATION ADULT. - PHYSICAL APPEARANCE
emaciated/other (specify) No edema; Sacral/Coccyx stage II pressure injury (as per flowsheets)  Nutrition focused physical exam conducted - found signs of malnutrition [ ]absent [X]present   Subcutaneous fat loss: [Moderate] Orbital fat pads region, [Moderate]Buccal fat region, [Severe]Triceps region,  [Moderate]Ribs region    Muscle wasting: [Moderate]Temples region, [Severe]Clavicle region, [Moderate]Shoulder region, [Moderate]Interosseous region,  [Severe]thigh region, [Severe]Calf region

## 2020-02-22 NOTE — DIETITIAN INITIAL EVALUATION ADULT. - OTHER INFO
86M with PMH of colon CA s/ R hemicolectomy (12 years ago), hernia repair, multiple SBOs without surgical intervention, and RA, presented yesterday with nausea/vomiting. Per family and patient, he has had multiple episodes of nausea and vomiting for 1 day, denies bloating, reports last BM 2 days prior to admission and was passing flatus until morning of admission. Patient had a recent stay at [place] about a month ago for SBO and discharge from rehab about 2 weeks ago. Surgery team managing SBO.    Patient tolerating clear liquid diet at time of RD visit. Currently NPO 2/2 SBO. PTA patient following and tolerating soft, low fiber diet at home. 2/18/20. +N/V on 2/20/20. Patient denies any nausea/vomiting/diarrhea difficulty swallowing fluids this AM. Patient taking B complex, Vitamin D, probiotics, omega-3 supplementation and intake of Ensure Enlive 240mls 1x daily (350 kcal, 20g protein) PTA.  Patient with recent weight loss this year with  lbs. Patient family reports that the patient has maintained his weight at 116lbs over the past 3-4 months. Past weight history obtained via HIE (6/07/19) 136 lbs; (10/05/19) 129.3lbs . Admit weight (2/20/20) 160 lbs? accuracy of weight. RD obtained weight via bed scale (2/22/20) 118 lbs which is close to weight family reported. BMI 16.5 (underweight).  ~42 lb weight loss noted x 1 year time frame noted.    NKFA or intolerances reported. Patient daughter stated "he has been eating a good amount, drinking ensure and has not gained any weight, and has had this pressure injury for 1 month now".  Encouraged adequate protein/ calorie intake to meet patients increased energy requirements for weight gain, wound healing. Patient and his family agreeable to Ensure Enlive 240mls 3x daily (1050kcal, 60g protein) when diet advanced.

## 2020-02-22 NOTE — PROGRESS NOTE ADULT - ATTENDING COMMENTS
Seen and examined, chart and note reviewed, case discussed with B team    Intestinal obstruction  a.  Denies pain  b.  Notes GI function  c.  Tolerating regular diet although minimal input    CAP  a.  Transition to oral abx per ID  b.  Continue ceftriazone at this time    Fluid overload/hyponatremia  a.  Give lasix    Hypokalemia  a.  Replete K  b.  Check level in am Seen and examined, chart and note reviewed, case discussed with B team    Intestinal obstruction  a.  Denies pain  b.  Notes GI function  c.  Tolerating regular diet although minimal input    CAP  a.  Transition to oral abx per ID  b.  Continue ceftriazone at this time    Fluid overload/hyponatremia  a.  Give lasix    Hypokalemia  a.  Replete K  b.  Check level in am    Above entry meant for Feb 24 progress note which had been appended to reflect this content

## 2020-02-22 NOTE — PROGRESS NOTE ADULT - ASSESSMENT
Assessment and Plan:   86M pmh colon CA s/ R hemicolectomy (12 years ago, [facility]), hernia repair multiple SBOs without surgical intervention, recent stay at [place] about a month ago for SBO and discharge from rehab about 2 weeks ago now p/w nausea/vomiting noted to be tachycardic to 110s, SBP 90-110s, Lactate 4.1 and WBC: 19, had CT scan which demonstrated pulmonary fibrosis with probable left lower lobe pneumonia and small bowel obstruction with single transition point in the right lower quadrant. Pneumonia improving, hiccuping this morning.     - Will make patient NPO and monitor for N/V  - Serial abdominal exams - no pain meds without exam  - Continue CTX for community acquired pneumonia       B Team Surgery  [id number]

## 2020-02-23 LAB
ANION GAP SERPL CALC-SCNC: 12 MMO/L — SIGNIFICANT CHANGE UP (ref 7–14)
ANION GAP SERPL CALC-SCNC: 6 MMO/L — LOW (ref 7–14)
BUN SERPL-MCNC: 7 MG/DL — SIGNIFICANT CHANGE UP (ref 7–23)
BUN SERPL-MCNC: 8 MG/DL — SIGNIFICANT CHANGE UP (ref 7–23)
CALCIUM SERPL-MCNC: 8 MG/DL — LOW (ref 8.4–10.5)
CALCIUM SERPL-MCNC: 8.3 MG/DL — LOW (ref 8.4–10.5)
CHLORIDE SERPL-SCNC: 96 MMOL/L — LOW (ref 98–107)
CHLORIDE SERPL-SCNC: 98 MMOL/L — SIGNIFICANT CHANGE UP (ref 98–107)
CO2 SERPL-SCNC: 24 MMOL/L — SIGNIFICANT CHANGE UP (ref 22–31)
CO2 SERPL-SCNC: 27 MMOL/L — SIGNIFICANT CHANGE UP (ref 22–31)
CREAT SERPL-MCNC: 0.6 MG/DL — SIGNIFICANT CHANGE UP (ref 0.5–1.3)
CREAT SERPL-MCNC: 0.65 MG/DL — SIGNIFICANT CHANGE UP (ref 0.5–1.3)
GLUCOSE SERPL-MCNC: 88 MG/DL — SIGNIFICANT CHANGE UP (ref 70–99)
GLUCOSE SERPL-MCNC: 97 MG/DL — SIGNIFICANT CHANGE UP (ref 70–99)
HCT VFR BLD CALC: 29.5 % — LOW (ref 39–50)
HGB BLD-MCNC: 10 G/DL — LOW (ref 13–17)
MAGNESIUM SERPL-MCNC: 1.6 MG/DL — SIGNIFICANT CHANGE UP (ref 1.6–2.6)
MAGNESIUM SERPL-MCNC: 2.3 MG/DL — SIGNIFICANT CHANGE UP (ref 1.6–2.6)
MCHC RBC-ENTMCNC: 31.5 PG — SIGNIFICANT CHANGE UP (ref 27–34)
MCHC RBC-ENTMCNC: 33.9 % — SIGNIFICANT CHANGE UP (ref 32–36)
MCV RBC AUTO: 93.1 FL — SIGNIFICANT CHANGE UP (ref 80–100)
NRBC # FLD: 0 K/UL — SIGNIFICANT CHANGE UP (ref 0–0)
PHOSPHATE SERPL-MCNC: 0.9 MG/DL — CRITICAL LOW (ref 2.5–4.5)
PHOSPHATE SERPL-MCNC: 2.4 MG/DL — LOW (ref 2.5–4.5)
PLATELET # BLD AUTO: 207 K/UL — SIGNIFICANT CHANGE UP (ref 150–400)
PMV BLD: 10 FL — SIGNIFICANT CHANGE UP (ref 7–13)
POTASSIUM SERPL-MCNC: 3.3 MMOL/L — LOW (ref 3.5–5.3)
POTASSIUM SERPL-MCNC: 3.8 MMOL/L — SIGNIFICANT CHANGE UP (ref 3.5–5.3)
POTASSIUM SERPL-SCNC: 3.3 MMOL/L — LOW (ref 3.5–5.3)
POTASSIUM SERPL-SCNC: 3.8 MMOL/L — SIGNIFICANT CHANGE UP (ref 3.5–5.3)
RBC # BLD: 3.17 M/UL — LOW (ref 4.2–5.8)
RBC # FLD: 14.6 % — HIGH (ref 10.3–14.5)
SODIUM SERPL-SCNC: 131 MMOL/L — LOW (ref 135–145)
SODIUM SERPL-SCNC: 132 MMOL/L — LOW (ref 135–145)
WBC # BLD: 12.07 K/UL — HIGH (ref 3.8–10.5)
WBC # FLD AUTO: 12.07 K/UL — HIGH (ref 3.8–10.5)

## 2020-02-23 PROCEDURE — 99231 SBSQ HOSP IP/OBS SF/LOW 25: CPT | Mod: GC

## 2020-02-23 RX ORDER — POTASSIUM PHOSPHATE, MONOBASIC POTASSIUM PHOSPHATE, DIBASIC 236; 224 MG/ML; MG/ML
30 INJECTION, SOLUTION INTRAVENOUS ONCE
Refills: 0 | Status: COMPLETED | OUTPATIENT
Start: 2020-02-23 | End: 2020-02-23

## 2020-02-23 RX ORDER — LANOLIN ALCOHOL/MO/W.PET/CERES
3 CREAM (GRAM) TOPICAL ONCE
Refills: 0 | Status: COMPLETED | OUTPATIENT
Start: 2020-02-23 | End: 2020-02-23

## 2020-02-23 RX ORDER — DEXTROSE MONOHYDRATE, SODIUM CHLORIDE, AND POTASSIUM CHLORIDE 50; .745; 4.5 G/1000ML; G/1000ML; G/1000ML
1000 INJECTION, SOLUTION INTRAVENOUS
Refills: 0 | Status: DISCONTINUED | OUTPATIENT
Start: 2020-02-23 | End: 2020-02-24

## 2020-02-23 RX ORDER — MAGNESIUM SULFATE 500 MG/ML
2 VIAL (ML) INJECTION ONCE
Refills: 0 | Status: COMPLETED | OUTPATIENT
Start: 2020-02-23 | End: 2020-02-23

## 2020-02-23 RX ADMIN — DEXTROSE MONOHYDRATE, SODIUM CHLORIDE, AND POTASSIUM CHLORIDE 50 MILLILITER(S): 50; .745; 4.5 INJECTION, SOLUTION INTRAVENOUS at 14:47

## 2020-02-23 RX ADMIN — ENOXAPARIN SODIUM 40 MILLIGRAM(S): 100 INJECTION SUBCUTANEOUS at 11:50

## 2020-02-23 RX ADMIN — Medication 3 MILLIGRAM(S): at 21:13

## 2020-02-23 RX ADMIN — PANTOPRAZOLE SODIUM 40 MILLIGRAM(S): 20 TABLET, DELAYED RELEASE ORAL at 16:57

## 2020-02-23 RX ADMIN — Medication 63.75 MILLIMOLE(S): at 21:15

## 2020-02-23 RX ADMIN — Medication 50 GRAM(S): at 14:47

## 2020-02-23 RX ADMIN — Medication 100 MILLIEQUIVALENT(S): at 01:01

## 2020-02-23 RX ADMIN — POTASSIUM PHOSPHATE, MONOBASIC POTASSIUM PHOSPHATE, DIBASIC 83.33 MILLIMOLE(S): 236; 224 INJECTION, SOLUTION INTRAVENOUS at 09:30

## 2020-02-23 RX ADMIN — CEFTRIAXONE 100 MILLIGRAM(S): 500 INJECTION, POWDER, FOR SOLUTION INTRAMUSCULAR; INTRAVENOUS at 16:55

## 2020-02-23 NOTE — PROGRESS NOTE ADULT - ASSESSMENT
86M pmh colon CA s/p R hemicolectomy (12 years ago, [facility]), hernia repair multiple SBOs without surgical intervention, recent stay at [place] about a month ago for SBO and discharge from rehab about 2 weeks ago now p/w nausea/vomiting noted to be tachycardic to 110s, SBP 90-110s, Lactate 4.1 and WBC: 19, had CT scan which demonstrated pulmonary fibrosis with probable left lower lobe pneumonia and small bowel obstruction with single transition point in the right lower quadrant. Pneumonia improving, hiccuping this morning with interval resolution.     - Diet: CLD; continue to monitor for N/V  - Serial abdominal exams - no pain meds without exam  - Continue CTX for community acquired pneumonia       B Team Surgery  [id number]

## 2020-02-23 NOTE — PROGRESS NOTE ADULT - SUBJECTIVE AND OBJECTIVE BOX
B Team Surgery Progress Note     SUBJECTIVE / 24H EVENTS  Patient seen and examined on morning rounds. No acute events overnight.  denies fevers, chills, nausea, or vomiting.     OBJECTIVE:    VITAL SIGNS:  T(C): 36.7 (20 @ 10:13), Max: 36.8 (20 @ 22:06)  HR: 90 (20 @ 10:13) (59 - 93)  BP: 127/79 (20 @ 10:13) (111/56 - 130/81)  RR: 17 (20 @ 10:13) (17 - 21)  SpO2: 99% (20 @ 10:13) (96% - 100%)    Daily     Daily Weight in k (2020 15:51)        PHYSICAL EXAM:  Gen: NAD  LS: Respirations unlabored. CTA b/l  Card: RRR. No m/r/g.   GI: Soft. Nontender. Nondistended. BS+.  Ext: Warm, well perfused      20 @ 07:01  -  20 @ 07:00  --------------------------------------------------------  IN:    dextrose 5% + sodium chloride 0.9%.: 1000 mL    IV PiggyBack: 300 mL  Total IN: 1300 mL    OUT:    Voided: 900 mL  Total OUT: 900 mL    Total NET: 400 mL      20 @ 07:01  -  20 @ 12:01  --------------------------------------------------------  IN:  Total IN: 0 mL    OUT:    Voided: 200 mL  Total OUT: 200 mL    Total NET: -200 mL          LAB VALUES:      131<L>  |  98  |  8   ----------------------------<  97  3.3<L>   |  27  |  0.65    Ca    8.0<L>      2020 07:25  Phos  0.9     -  Mg     1.6                                      10.0   12.07 )-----------( 207      ( 2020 07:25 )             29.5                   MICROBIOLOGY:    No new microbiology data for review.     RADIOLOGY:    No new radiographic images for review.    MEDICATIONS  (STANDING):  cefTRIAXone   IVPB 1000 milliGRAM(s) IV Intermittent every 24 hours  dextrose 5% + sodium chloride 0.9%. 1000 milliLiter(s) (100 mL/Hr) IV Continuous <Continuous>  enoxaparin Injectable 40 milliGRAM(s) SubCutaneous daily  pantoprazole  Injectable 40 milliGRAM(s) IV Push every 24 hours    MEDICATIONS  (PRN):  melatonin 3 milliGRAM(s) Oral at bedtime PRN Insomnia B Team Surgery Progress Note     SUBJECTIVE / 24H EVENTS  Patient seen and examined on morning rounds. No acute events overnight. Reports flatus and improved abdominal pain. Complains of hiccups. Denies fevers, chills, nausea, or vomiting.     OBJECTIVE:    VITAL SIGNS:  T(C): 36.7 (20 @ 10:13), Max: 36.8 (20 @ 22:06)  HR: 90 (20 @ 10:13) (59 - 93)  BP: 127/79 (20 @ 10:13) (111/56 - 130/81)  RR: 17 (20 @ 10:13) (17 - 21)  SpO2: 99% (20 @ 10:13) (96% - 100%)    Daily     Daily Weight in k (2020 15:51)        PHYSICAL EXAM:  Gen: NAD  LS: Respirations unlabored. CTA b/l  Card: RRR. No m/r/g.   GI: Soft. Nontender. Nondistended.   Ext: Warm, well perfused      20 @ 07:01  -  20 @ 07:00  --------------------------------------------------------  IN:    dextrose 5% + sodium chloride 0.9%.: 1000 mL    IV PiggyBack: 300 mL  Total IN: 1300 mL    OUT:    Voided: 900 mL  Total OUT: 900 mL    Total NET: 400 mL      20 @ 07:01  -  20 @ 12:01  --------------------------------------------------------  IN:  Total IN: 0 mL    OUT:    Voided: 200 mL  Total OUT: 200 mL    Total NET: -200 mL          LAB VALUES:      131<L>  |  98  |  8   ----------------------------<  97  3.3<L>   |  27  |  0.65    Ca    8.0<L>      2020 07:25  Phos  0.9     02-  Mg     1.6     -                                 10.0   12.07 )-----------( 207      ( 2020 07:25 )             29.5         MICROBIOLOGY:    No new microbiology data for review.     RADIOLOGY:    No new radiographic images for review.    MEDICATIONS  (STANDING):  cefTRIAXone   IVPB 1000 milliGRAM(s) IV Intermittent every 24 hours  dextrose 5% + sodium chloride 0.9%. 1000 milliLiter(s) (100 mL/Hr) IV Continuous <Continuous>  enoxaparin Injectable 40 milliGRAM(s) SubCutaneous daily  pantoprazole  Injectable 40 milliGRAM(s) IV Push every 24 hours    MEDICATIONS  (PRN):  melatonin 3 milliGRAM(s) Oral at bedtime PRN Insomnia

## 2020-02-23 NOTE — PROGRESS NOTE ADULT - ATTENDING COMMENTS
I saw and examined the patient. I was physically present for the key portions of the evaluation and management (E/M) service provided.  I agree with the above history, physical, and plan which I have reviewed and edited where appropriate.    Germain Grossman MD  Acute and Critical Care Surgery    The Acute Care Surgery (B Team) Attending Group Practice:  Dr. Judith Fox, Dr. Papito Wilson, Dr. Germain Grossman, and Dr. Lewis Nieto    Urgent issues - spectra [id number] or [id number]  Nonurgent issues - [phone]  Patient appointments or after hours - [phone]

## 2020-02-24 LAB
ANION GAP SERPL CALC-SCNC: 12 MMO/L — SIGNIFICANT CHANGE UP (ref 7–14)
BUN SERPL-MCNC: 7 MG/DL — SIGNIFICANT CHANGE UP (ref 7–23)
CALCIUM SERPL-MCNC: 7.8 MG/DL — LOW (ref 8.4–10.5)
CHLORIDE SERPL-SCNC: 93 MMOL/L — LOW (ref 98–107)
CO2 SERPL-SCNC: 23 MMOL/L — SIGNIFICANT CHANGE UP (ref 22–31)
CREAT SERPL-MCNC: 0.67 MG/DL — SIGNIFICANT CHANGE UP (ref 0.5–1.3)
GLUCOSE SERPL-MCNC: 75 MG/DL — SIGNIFICANT CHANGE UP (ref 70–99)
HCT VFR BLD CALC: 31.6 % — LOW (ref 39–50)
HGB BLD-MCNC: 10.6 G/DL — LOW (ref 13–17)
MAGNESIUM SERPL-MCNC: 1.9 MG/DL — SIGNIFICANT CHANGE UP (ref 1.6–2.6)
MCHC RBC-ENTMCNC: 30.8 PG — SIGNIFICANT CHANGE UP (ref 27–34)
MCHC RBC-ENTMCNC: 33.5 % — SIGNIFICANT CHANGE UP (ref 32–36)
MCV RBC AUTO: 91.9 FL — SIGNIFICANT CHANGE UP (ref 80–100)
NRBC # FLD: 0 K/UL — SIGNIFICANT CHANGE UP (ref 0–0)
PHOSPHATE SERPL-MCNC: 1.9 MG/DL — LOW (ref 2.5–4.5)
PLATELET # BLD AUTO: 220 K/UL — SIGNIFICANT CHANGE UP (ref 150–400)
PMV BLD: 10.4 FL — SIGNIFICANT CHANGE UP (ref 7–13)
POTASSIUM SERPL-MCNC: 3.3 MMOL/L — LOW (ref 3.5–5.3)
POTASSIUM SERPL-SCNC: 3.3 MMOL/L — LOW (ref 3.5–5.3)
RBC # BLD: 3.44 M/UL — LOW (ref 4.2–5.8)
RBC # FLD: 14.5 % — SIGNIFICANT CHANGE UP (ref 10.3–14.5)
SODIUM SERPL-SCNC: 128 MMOL/L — LOW (ref 135–145)
WBC # BLD: 14.87 K/UL — HIGH (ref 3.8–10.5)
WBC # FLD AUTO: 14.87 K/UL — HIGH (ref 3.8–10.5)

## 2020-02-24 PROCEDURE — 99232 SBSQ HOSP IP/OBS MODERATE 35: CPT

## 2020-02-24 RX ORDER — ASPIRIN/CALCIUM CARB/MAGNESIUM 324 MG
81 TABLET ORAL DAILY
Refills: 0 | Status: DISCONTINUED | OUTPATIENT
Start: 2020-02-24 | End: 2020-02-28

## 2020-02-24 RX ORDER — ATORVASTATIN CALCIUM 80 MG/1
10 TABLET, FILM COATED ORAL AT BEDTIME
Refills: 0 | Status: DISCONTINUED | OUTPATIENT
Start: 2020-02-24 | End: 2020-02-28

## 2020-02-24 RX ORDER — SODIUM,POTASSIUM PHOSPHATES 278-250MG
1 POWDER IN PACKET (EA) ORAL
Refills: 0 | Status: COMPLETED | OUTPATIENT
Start: 2020-02-24 | End: 2020-02-26

## 2020-02-24 RX ORDER — LANOLIN ALCOHOL/MO/W.PET/CERES
3 CREAM (GRAM) TOPICAL ONCE
Refills: 0 | Status: COMPLETED | OUTPATIENT
Start: 2020-02-24 | End: 2020-02-24

## 2020-02-24 RX ORDER — HYDROXYCHLOROQUINE SULFATE 200 MG
200 TABLET ORAL
Refills: 0 | Status: DISCONTINUED | OUTPATIENT
Start: 2020-02-24 | End: 2020-02-28

## 2020-02-24 RX ORDER — POTASSIUM CHLORIDE 20 MEQ
40 PACKET (EA) ORAL EVERY 4 HOURS
Refills: 0 | Status: COMPLETED | OUTPATIENT
Start: 2020-02-24 | End: 2020-02-24

## 2020-02-24 RX ORDER — FUROSEMIDE 40 MG
20 TABLET ORAL ONCE
Refills: 0 | Status: COMPLETED | OUTPATIENT
Start: 2020-02-24 | End: 2020-02-24

## 2020-02-24 RX ORDER — PANTOPRAZOLE SODIUM 20 MG/1
40 TABLET, DELAYED RELEASE ORAL
Refills: 0 | Status: DISCONTINUED | OUTPATIENT
Start: 2020-02-24 | End: 2020-02-28

## 2020-02-24 RX ADMIN — Medication 40 MILLIEQUIVALENT(S): at 14:47

## 2020-02-24 RX ADMIN — Medication 200 MILLIGRAM(S): at 18:31

## 2020-02-24 RX ADMIN — PANTOPRAZOLE SODIUM 40 MILLIGRAM(S): 20 TABLET, DELAYED RELEASE ORAL at 06:58

## 2020-02-24 RX ADMIN — Medication 40 MILLIEQUIVALENT(S): at 10:01

## 2020-02-24 RX ADMIN — CEFTRIAXONE 100 MILLIGRAM(S): 500 INJECTION, POWDER, FOR SOLUTION INTRAMUSCULAR; INTRAVENOUS at 16:15

## 2020-02-24 RX ADMIN — Medication 63.75 MILLIMOLE(S): at 10:02

## 2020-02-24 RX ADMIN — Medication 3 MILLIGRAM(S): at 00:28

## 2020-02-24 RX ADMIN — Medication 20 MILLIGRAM(S): at 16:15

## 2020-02-24 RX ADMIN — Medication 1 TABLET(S): at 18:31

## 2020-02-24 RX ADMIN — Medication 1 TABLET(S): at 10:02

## 2020-02-24 RX ADMIN — Medication 81 MILLIGRAM(S): at 10:02

## 2020-02-24 RX ADMIN — Medication 1 TABLET(S): at 21:29

## 2020-02-24 RX ADMIN — Medication 200 MILLIGRAM(S): at 10:02

## 2020-02-24 RX ADMIN — Medication 3 MILLIGRAM(S): at 05:18

## 2020-02-24 RX ADMIN — ENOXAPARIN SODIUM 40 MILLIGRAM(S): 100 INJECTION SUBCUTANEOUS at 10:02

## 2020-02-24 RX ADMIN — ATORVASTATIN CALCIUM 10 MILLIGRAM(S): 80 TABLET, FILM COATED ORAL at 21:29

## 2020-02-24 NOTE — PROGRESS NOTE ADULT - ASSESSMENT
86M pmh colon CA s/p R hemicolectomy (12 years ago, [facility]), hernia repair multiple SBOs without surgical intervention, recent stay at [place] about a month ago for SBO and discharge from rehab about 2 weeks ago now p/w nausea/vomiting noted to be tachycardic to 110s, SBP 90-110s, Lactate 4.1 and WBC: 19, had CT scan which demonstrated pulmonary fibrosis with probable left lower lobe pneumonia and small bowel obstruction with single transition point in the right lower quadrant. 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     - Diet: LRD; continue to monitor for N/V  - Serial abdominal exams - no pain meds without exam  - Melatonin at bedtime  - Continue CTX for community acquired pneumonia   - Dispo: Rehab Planning      B Team Surgery [id number] 86M pmh colon CA s/p R hemicolectomy (12 years ago, [facility]), hernia repair multiple SBOs without surgical intervention, recent stay at [place] about a month ago for SBO and discharge from rehab about 2 weeks ago now p/w nausea/vomiting noted to be tachycardic to 110s, SBP 90-110s, Lactate 4.1 and WBC: 19, had CT scan which demonstrated pulmonary fibrosis with probable left lower lobe pneumonia and small bowel obstruction with single transition point in the right lower quadrant. 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     - Diet: LRD; continue to monitor for N/V  - Serial abdominal exams - no pain meds without exam  - Melatonin at bedtime  - Continue 5 days (2/26) of CTX per Pulm recs for community acquired pneumonia   - Dispo: Rehab Planning      B Team Surgery [id number] 86M pmh colon CA s/p R hemicolectomy (12 years ago, [facility]), hernia repair multiple SBOs without surgical intervention, recent stay at [place] about a month ago for SBO and discharge from rehab about 2 weeks ago now p/w nausea/vomiting noted to be tachycardic to 110s, SBP 90-110s, Lactate 4.1 and WBC: 19, had CT scan which demonstrated pulmonary fibrosis with probable left lower lobe pneumonia and small bowel obstruction with single transition point in the right lower quadrant. 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     - Diet: LRD; continue to monitor for N/V  - Serial abdominal exams - no pain meds without exam  - Replete hypophosphatemia, hypokalemia and hypomagnesemia  - Lasix 20mg today for hypervolemia  - Monitor intake & output  - Melatonin at bedtime  - Continue 5 days (2/25) of CTX per Pulm recs for community acquired pneumonia   - Dispo: Rehab Planning      B Team Surgery [id number]

## 2020-02-24 NOTE — PROVIDER CONTACT NOTE (OTHER) - SITUATION
Pt has not slept for 4 days, has become increasingly anxious and restless. Pt is also starting to hallucinate.

## 2020-02-24 NOTE — PROGRESS NOTE ADULT - ATTENDING COMMENTS
Aspiration pneumonia.  Last dose of antibiotics today.  Outpatient follow up for dedicated CT chest in 4 weeks.  Follow up at [address] [facility], [address], [place], [address], [phone].  Will sign off. Please call with questions.

## 2020-02-24 NOTE — PROGRESS NOTE ADULT - SUBJECTIVE AND OBJECTIVE BOX
B Team Surgery Progress Note     SUBJECTIVE / 24H EVENTS  Patient seen and examined on morning rounds. No acute events overnight. He is passing flatus, having bowel movements, and tolerating a diet. There is concern from his family about him having sundowning symptoms. He denies fevers, chills, nausea, or vomiting.     OBJECTIVE:    VITAL SIGNS:  T(C): 37 (02-23-20 @ 21:43), Max: 37 (02-23-20 @ 21:43)  HR: 109 (02-23-20 @ 21:43) (62 - 109)  BP: 124/84 (02-23-20 @ 21:43) (117/72 - 136/83)  RR: 18 (02-23-20 @ 21:43) (17 - 21)  SpO2: 95% (02-23-20 @ 21:43) (95% - 100%)      PHYSICAL EXAM:  Gen: NAD  LS: Respirations unlabored. CTA b/l  Card: RRR. No m/r/g.   GI: Soft. Nontender. Nondistended.   Ext: Warm, well perfused      02-22-20 @ 07:01  -  02-23-20 @ 07:00  --------------------------------------------------------  IN:    dextrose 5% + sodium chloride 0.9%: 1000 mL    IV PiggyBack: 300 mL  Total IN: 1300 mL    OUT:    Voided: 900 mL  Total OUT: 900 mL    Total NET: 400 mL      02-23-20 @ 07:01  -  02-24-20 @ 01:35  --------------------------------------------------------  IN:    dextrose 5% + sodium chloride 0.9%: 800 mL    dextrose 5% + sodium chloride 0.9% with potassium chloride 20 mEq/L: 350 mL    IV PiggyBack: 550 mL    Oral Fluid: 480 mL  Total IN: 2180 mL    OUT:    Voided: 1175 mL  Total OUT: 1175 mL    Total NET: 1005 mL          LAB VALUES:  02-23    132<L>  |  96<L>  |  7   ----------------------------<  88  3.8   |  24  |  0.60    Ca    8.3<L>      23 Feb 2020 17:00  Phos  2.4     02-23  Mg     2.3     02-23                                 10.0   12.07 )-----------( 207      ( 23 Feb 2020 07:25 )             29.5         MICROBIOLOGY:    No new microbiology data for review.     RADIOLOGY:    No new radiographic images for review.    MEDICATIONS  (STANDING):  cefTRIAXone   IVPB 1000 milliGRAM(s) IV Intermittent every 24 hours  dextrose 5% + sodium chloride 0.9% with potassium chloride 20 mEq/L 1000 milliLiter(s) (50 mL/Hr) IV Continuous <Continuous>  enoxaparin Injectable 40 milliGRAM(s) SubCutaneous daily  pantoprazole  Injectable 40 milliGRAM(s) IV Push every 24 hours    MEDICATIONS  (PRN):  melatonin 3 milliGRAM(s) Oral at bedtime PRN Insomnia

## 2020-02-24 NOTE — PROGRESS NOTE ADULT - ASSESSMENT
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  Pt is now clinically improved, afebrile, mild rhonchi at bases, no hypoxemia at rest.   - Complete 5 days antibiotics for CAP  - Outpatient follow up with his rheumatologist or pulmonologist, repeat CT chest in 4-6 weeks  - Ambulate to ensure no need for O2 on dischage  - Patient can follow up with us outpatient at [address] [place] [address], [address]. Number [phone]  Appointments can also be made by e-mailing [email or web address] and providing patient's name, , and discharge diagnosis    Faheem Plascencia MD  Fellow, Pulmonary and Critical Care Medicine  [facility]  Pager: [phone], [phone] ([facility])  Email: [email or web address] 86M pmh colon CA s/ R hemicolectomy (12 years ago), hernia repair, multiple SBOs (most recently 1 month ago at [place]), and RA admitted for SBO and found to have left lower lobe pneumonia and likely pulmonary fibrosis on abdominal CT, 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  Pt is now clinically improved, afebrile, mild rhonchi at bases, no hypoxemia at rest.   - Complete 5 days antibiotics for CAP/aspiration  - Outpatient follow up with his rheumatologist or pulmonologist, repeat CT chest in 4-6 weeks  - Ambulate to ensure no need for O2 on dischage  - Patient can follow up with us outpatient at [address] [place] [address], [address]. Number [phone]  Appointments can also be made by e-mailing [email or web address] and providing patient's name, , and discharge diagnosis    Faheem Plascencia MD  Fellow, Pulmonary and Critical Care Medicine  [facility]  Pager: [phone], [phone] ([facility])  Email: [email or web address]

## 2020-02-24 NOTE — PROGRESS NOTE ADULT - SUBJECTIVE AND OBJECTIVE BOX
CHIEF COMPLAINT:    Interval Events:    REVIEW OF SYSTEMS:  Constitutional: [ ] negative [ ] fevers [ ] chills [ ] weight loss [ ] weight gain  HEENT: [ ] negative [ ] dry eyes [ ] eye irritation [ ] postnasal drip [ ] nasal congestion  CV: [ ] negative  [ ] chest pain [ ] orthopnea [ ] palpitations [ ] murmur  Resp: [ ] negative [ ] cough [ ] shortness of breath [ ] dyspnea [ ] wheezing [ ] sputum [ ] hemoptysis  GI: [ ] negative [ ] nausea [ ] vomiting [ ] diarrhea [ ] constipation [ ] abd pain [ ] dysphagia   : [ ] negative [ ] dysuria [ ] nocturia [ ] hematuria [ ] increased urinary frequency  Musculoskeletal: [ ] negative [ ] back pain [ ] myalgias [ ] arthralgias [ ] fracture  Skin: [ ] negative [ ] rash [ ] itch  Neurological: [ ] negative [ ] headache [ ] dizziness [ ] syncope [ ] weakness [ ] numbness  Psychiatric: [ ] negative [ ] anxiety [ ] depression  Endocrine: [ ] negative [ ] diabetes [ ] thyroid problem  Hematologic/Lymphatic: [ ] negative [ ] anemia [ ] bleeding problem  Allergic/Immunologic: [ ] negative [ ] itchy eyes [ ] nasal discharge [ ] hives [ ] angioedema  [ ] All other systems negative  [ ] Unable to assess ROS because ________    OBJECTIVE:  ICU Vital Signs Last 24 Hrs  T(C): 37 (24 Feb 2020 05:13), Max: 37.4 (24 Feb 2020 02:31)  T(F): 98.6 (24 Feb 2020 05:13), Max: 99.3 (24 Feb 2020 02:31)  HR: 56 (24 Feb 2020 05:13) (50 - 109)  BP: 120/70 (24 Feb 2020 05:13) (120/70 - 136/83)  BP(mean): --  ABP: --  ABP(mean): --  RR: 20 (24 Feb 2020 05:13) (17 - 20)  SpO2: 95% (24 Feb 2020 05:13) (95% - 100%)        02-23 @ 07:01  -  02-24 @ 07:00  --------------------------------------------------------  IN: 2780 mL / OUT: 1395 mL / NET: 1385 mL      CAPILLARY BLOOD GLUCOSE          PHYSICAL EXAM:  General:   HEENT:   Lymph Nodes:  Neck:   Respiratory:   Cardiovascular:   Abdomen:   Extremities:   Skin:   Neurological:  Psychiatry:    HOSPITAL MEDICATIONS:  MEDICATIONS  (STANDING):  aspirin  chewable 81 milliGRAM(s) Oral daily  atorvastatin 10 milliGRAM(s) Oral at bedtime  cefTRIAXone   IVPB 1000 milliGRAM(s) IV Intermittent every 24 hours  enoxaparin Injectable 40 milliGRAM(s) SubCutaneous daily  furosemide    Tablet 20 milliGRAM(s) Oral once  hydroxychloroquine 200 milliGRAM(s) Oral two times a day  pantoprazole    Tablet 40 milliGRAM(s) Oral before breakfast  potassium acid phosphate/sodium acid phosphate tablet (K-PHOS No. 2) 1 Tablet(s) Oral four times a day with meals  potassium chloride    Tablet ER 40 milliEquivalent(s) Oral every 4 hours  sodium phosphate IVPB 15 milliMole(s) IV Intermittent once    MEDICATIONS  (PRN):  melatonin 3 milliGRAM(s) Oral at bedtime PRN Insomnia      LABS:                        10.6   14.87 )-----------( 220      ( 24 Feb 2020 06:08 )             31.6     Hgb Trend: 10.6<--, 10.0<--, 10.0<--, 11.6<--, 13.0<--  02-24    128<L>  |  93<L>  |  7   ----------------------------<  75  3.3<L>   |  23  |  0.67    Ca    7.8<L>      24 Feb 2020 06:08  Phos  1.9     02-24  Mg     1.9     02-24      Creatinine Trend: 0.67<--, 0.60<--, 0.65<--, 0.63<--, 0.62<--, 0.86<--            MICROBIOLOGY:       RADIOLOGY:  [ ] Reviewed and interpreted by me    PULMONARY FUNCTION TESTS:    EKG: CHIEF COMPLAINT:    Interval Events:    REVIEW OF SYSTEMS:  Constitutional: [ ] negative [ ] fevers [ ] chills [ ] weight loss [ ] weight gain  HEENT: [ ] negative [ ] dry eyes [ ] eye irritation [ ] postnasal drip [ ] nasal congestion  CV: [ ] negative  [ ] chest pain [ ] orthopnea [ ] palpitations [ ] murmur  Resp: [ ] negative [ ] cough [ ] shortness of breath [ ] dyspnea [ ] wheezing [ ] sputum [ ] hemoptysis  GI: [ ] negative [ ] nausea [ ] vomiting [ ] diarrhea [ ] constipation [ ] abd pain [ ] dysphagia   : [ ] negative [ ] dysuria [ ] nocturia [ ] hematuria [ ] increased urinary frequency  Musculoskeletal: [ ] negative [ ] back pain [ ] myalgias [ ] arthralgias [ ] fracture  Skin: [ ] negative [ ] rash [ ] itch  Neurological: [ ] negative [ ] headache [ ] dizziness [ ] syncope [ ] weakness [ ] numbness  Psychiatric: [ ] negative [ ] anxiety [ ] depression  Endocrine: [ ] negative [ ] diabetes [ ] thyroid problem  Hematologic/Lymphatic: [ ] negative [ ] anemia [ ] bleeding problem  Allergic/Immunologic: [ ] negative [ ] itchy eyes [ ] nasal discharge [ ] hives [ ] angioedema  [x] All other systems negative  [ ] Unable to assess ROS because ________    OBJECTIVE:  ICU Vital Signs Last 24 Hrs  T(C): 37 (24 Feb 2020 05:13), Max: 37.4 (24 Feb 2020 02:31)  T(F): 98.6 (24 Feb 2020 05:13), Max: 99.3 (24 Feb 2020 02:31)  HR: 56 (24 Feb 2020 05:13) (50 - 109)  BP: 120/70 (24 Feb 2020 05:13) (120/70 - 136/83)  BP(mean): --  ABP: --  ABP(mean): --  RR: 20 (24 Feb 2020 05:13) (17 - 20)  SpO2: 95% (24 Feb 2020 05:13) (95% - 100%)        02-23 @ 07:01  -  02-24 @ 07:00  --------------------------------------------------------  IN: 2780 mL / OUT: 1395 mL / NET: 1385 mL      CAPILLARY BLOOD GLUCOSE          PHYSICAL EXAM:  General:   HEENT:   Lymph Nodes:  Neck:   Respiratory:   Cardiovascular:   Abdomen:   Extremities:   Skin:   Neurological:  Psychiatry:    HOSPITAL MEDICATIONS:  MEDICATIONS  (STANDING):  aspirin  chewable 81 milliGRAM(s) Oral daily  atorvastatin 10 milliGRAM(s) Oral at bedtime  cefTRIAXone   IVPB 1000 milliGRAM(s) IV Intermittent every 24 hours  enoxaparin Injectable 40 milliGRAM(s) SubCutaneous daily  furosemide    Tablet 20 milliGRAM(s) Oral once  hydroxychloroquine 200 milliGRAM(s) Oral two times a day  pantoprazole    Tablet 40 milliGRAM(s) Oral before breakfast  potassium acid phosphate/sodium acid phosphate tablet (K-PHOS No. 2) 1 Tablet(s) Oral four times a day with meals  potassium chloride    Tablet ER 40 milliEquivalent(s) Oral every 4 hours  sodium phosphate IVPB 15 milliMole(s) IV Intermittent once    MEDICATIONS  (PRN):  melatonin 3 milliGRAM(s) Oral at bedtime PRN Insomnia      LABS:                        10.6   14.87 )-----------( 220      ( 24 Feb 2020 06:08 )             31.6     Hgb Trend: 10.6<--, 10.0<--, 10.0<--, 11.6<--, 13.0<--  02-24    128<L>  |  93<L>  |  7   ----------------------------<  75  3.3<L>   |  23  |  0.67    Ca    7.8<L>      24 Feb 2020 06:08  Phos  1.9     02-24  Mg     1.9     02-24      Creatinine Trend: 0.67<--, 0.60<--, 0.65<--, 0.63<--, 0.62<--, 0.86<--            MICROBIOLOGY:       RADIOLOGY:  [ ] Reviewed and interpreted by me    PULMONARY FUNCTION TESTS:    EKG:

## 2020-02-24 NOTE — PROGRESS NOTE ADULT - ATTENDING COMMENTS
Seen and examined, chart and note reviewed, case discussed with b TEAM    sbo, resolved  a.  Diet as tolerated   b.  Await placement    Pneumonia  a.  ABx per pulmonary    Service provided date of note, entry of input may be delayed

## 2020-02-25 ENCOUNTER — TRANSCRIPTION ENCOUNTER (OUTPATIENT)
Age: 85
End: 2020-02-25

## 2020-02-25 DIAGNOSIS — M06.9 RHEUMATOID ARTHRITIS, UNSPECIFIED: ICD-10-CM

## 2020-02-25 DIAGNOSIS — E83.39 OTHER DISORDERS OF PHOSPHORUS METABOLISM: ICD-10-CM

## 2020-02-25 DIAGNOSIS — E87.1 HYPO-OSMOLALITY AND HYPONATREMIA: ICD-10-CM

## 2020-02-25 DIAGNOSIS — E87.6 HYPOKALEMIA: ICD-10-CM

## 2020-02-25 DIAGNOSIS — E83.42 HYPOMAGNESEMIA: ICD-10-CM

## 2020-02-25 LAB
ANION GAP SERPL CALC-SCNC: 12 MMO/L — SIGNIFICANT CHANGE UP (ref 7–14)
BACTERIA BLD CULT: SIGNIFICANT CHANGE UP
BACTERIA BLD CULT: SIGNIFICANT CHANGE UP
BUN SERPL-MCNC: 10 MG/DL — SIGNIFICANT CHANGE UP (ref 7–23)
CALCIUM SERPL-MCNC: 8.3 MG/DL — LOW (ref 8.4–10.5)
CHLORIDE SERPL-SCNC: 97 MMOL/L — LOW (ref 98–107)
CO2 SERPL-SCNC: 24 MMOL/L — SIGNIFICANT CHANGE UP (ref 22–31)
CREAT SERPL-MCNC: 0.75 MG/DL — SIGNIFICANT CHANGE UP (ref 0.5–1.3)
GLUCOSE BLDC GLUCOMTR-MCNC: 101 MG/DL — HIGH (ref 70–99)
GLUCOSE BLDC GLUCOMTR-MCNC: 73 MG/DL — SIGNIFICANT CHANGE UP (ref 70–99)
GLUCOSE SERPL-MCNC: 69 MG/DL — LOW (ref 70–99)
HCT VFR BLD CALC: 32.1 % — LOW (ref 39–50)
HGB BLD-MCNC: 10.8 G/DL — LOW (ref 13–17)
MAGNESIUM SERPL-MCNC: 1.8 MG/DL — SIGNIFICANT CHANGE UP (ref 1.6–2.6)
MCHC RBC-ENTMCNC: 31.1 PG — SIGNIFICANT CHANGE UP (ref 27–34)
MCHC RBC-ENTMCNC: 33.6 % — SIGNIFICANT CHANGE UP (ref 32–36)
MCV RBC AUTO: 92.5 FL — SIGNIFICANT CHANGE UP (ref 80–100)
NRBC # FLD: 0 K/UL — SIGNIFICANT CHANGE UP (ref 0–0)
PHOSPHATE SERPL-MCNC: 2.4 MG/DL — LOW (ref 2.5–4.5)
PLATELET # BLD AUTO: 220 K/UL — SIGNIFICANT CHANGE UP (ref 150–400)
PMV BLD: 10.4 FL — SIGNIFICANT CHANGE UP (ref 7–13)
POTASSIUM SERPL-MCNC: 3.7 MMOL/L — SIGNIFICANT CHANGE UP (ref 3.5–5.3)
POTASSIUM SERPL-SCNC: 3.7 MMOL/L — SIGNIFICANT CHANGE UP (ref 3.5–5.3)
RBC # BLD: 3.47 M/UL — LOW (ref 4.2–5.8)
RBC # FLD: 14.9 % — HIGH (ref 10.3–14.5)
SODIUM SERPL-SCNC: 133 MMOL/L — LOW (ref 135–145)
WBC # BLD: 14.65 K/UL — HIGH (ref 3.8–10.5)
WBC # FLD AUTO: 14.65 K/UL — HIGH (ref 3.8–10.5)

## 2020-02-25 PROCEDURE — 99231 SBSQ HOSP IP/OBS SF/LOW 25: CPT

## 2020-02-25 PROCEDURE — 99238 HOSP IP/OBS DSCHRG MGMT 30/<: CPT

## 2020-02-25 RX ORDER — POTASSIUM PHOSPHATE, MONOBASIC POTASSIUM PHOSPHATE, DIBASIC 236; 224 MG/ML; MG/ML
15 INJECTION, SOLUTION INTRAVENOUS ONCE
Refills: 0 | Status: COMPLETED | OUTPATIENT
Start: 2020-02-25 | End: 2020-02-25

## 2020-02-25 RX ORDER — QUETIAPINE FUMARATE 200 MG/1
25 TABLET, FILM COATED ORAL ONCE
Refills: 0 | Status: COMPLETED | OUTPATIENT
Start: 2020-02-25 | End: 2020-02-25

## 2020-02-25 RX ADMIN — Medication 200 MILLIGRAM(S): at 18:27

## 2020-02-25 RX ADMIN — Medication 1 TABLET(S): at 08:35

## 2020-02-25 RX ADMIN — Medication 1 TABLET(S): at 21:22

## 2020-02-25 RX ADMIN — Medication 1 TABLET(S): at 11:35

## 2020-02-25 RX ADMIN — Medication 1 TABLET(S): at 16:34

## 2020-02-25 RX ADMIN — Medication 81 MILLIGRAM(S): at 11:36

## 2020-02-25 RX ADMIN — Medication 3 MILLIGRAM(S): at 21:22

## 2020-02-25 RX ADMIN — Medication 200 MILLIGRAM(S): at 08:35

## 2020-02-25 RX ADMIN — POTASSIUM PHOSPHATE, MONOBASIC POTASSIUM PHOSPHATE, DIBASIC 62.5 MILLIMOLE(S): 236; 224 INJECTION, SOLUTION INTRAVENOUS at 09:27

## 2020-02-25 RX ADMIN — ENOXAPARIN SODIUM 40 MILLIGRAM(S): 100 INJECTION SUBCUTANEOUS at 11:36

## 2020-02-25 RX ADMIN — ATORVASTATIN CALCIUM 10 MILLIGRAM(S): 80 TABLET, FILM COATED ORAL at 21:22

## 2020-02-25 RX ADMIN — PANTOPRAZOLE SODIUM 40 MILLIGRAM(S): 20 TABLET, DELAYED RELEASE ORAL at 08:35

## 2020-02-25 RX ADMIN — CEFTRIAXONE 100 MILLIGRAM(S): 500 INJECTION, POWDER, FOR SOLUTION INTRAMUSCULAR; INTRAVENOUS at 16:02

## 2020-02-25 RX ADMIN — QUETIAPINE FUMARATE 25 MILLIGRAM(S): 200 TABLET, FILM COATED ORAL at 02:07

## 2020-02-25 NOTE — DISCHARGE NOTE PROVIDER - NSFOLLOWUPCLINICS_GEN_ALL_ED_FT
[facility] Pulmonolgy and Sleep Medicine  Pulmonology  [address] [place] [address]  Phone: [phone]  Fax:   Follow Up Time:

## 2020-02-25 NOTE — PROVIDER CONTACT NOTE (OTHER) - REASON
Pt has not slept for 4 days, after receiving seroquel at 2 am, he is still sleeping, reschedule 6am meds

## 2020-02-25 NOTE — PROGRESS NOTE ADULT - ASSESSMENT
86M pmh colon CA s/p R hemicolectomy (12 years ago, [facility]), hernia repair multiple SBOs without surgical intervention, recent stay at [place] about a month ago for SBO and discharge from rehab about 2 weeks ago now p/w nausea/vomiting noted to be tachycardic to 110s, SBP 90-110s, Lactate 4.1 and WBC: 19, had CT scan which demonstrated pulmonary fibrosis with probable left lower lobe pneumonia and small bowel obstruction with single transition point in the right lower quadrant. 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     - Diet: LRD; continue to monitor for N/V  - Monitor intake & output  - Melatonin at bedtime  - Continue 5 days (2/25) of CTX per Pulm recs for community acquired pneumonia - Last dose today  - Dispo: Rehab Planning      B Team Surgery [id number] 86M pmh colon CA s/p R hemicolectomy (12 years ago, [facility]), hernia repair multiple SBOs without surgical intervention, recent stay at [place] about a month ago for SBO and discharge from rehab about 2 weeks ago now p/w nausea/vomiting noted to be tachycardic to 110s, SBP 90-110s, Lactate 4.1 and WBC: 19, had CT scan which demonstrated pulmonary fibrosis with probable left lower lobe pneumonia and small bowel obstruction with single transition point in the right lower quadrant. 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     - Diet: LRD; continue to monitor for N/V  - Monitor intake & output  - Melatonin at bedtime  - Continue 5 days (2/25) of CTX per Pulm recs for community acquired pneumonia - Last dose today  - Outpatient pulmonary follow up  - Dispo: Rehab Planning when bed available      B Team Surgery [id number]

## 2020-02-25 NOTE — DISCHARGE NOTE PROVIDER - NSDCFUADDINST_GEN_ALL_CORE_FT
Please call to schedule a follow up appointment with your primary care provider within one week of discharge. Please bring discharge paperwork to your appointment.    You will need to follow up with your rheumatologist and a pulmonologist of your choice. [facility] Pulmonology number is provided for you. You will need a repeat CT Chest 4-6 weeks after discharge.

## 2020-02-25 NOTE — DISCHARGE NOTE PROVIDER - NSDCMRMEDTOKEN_GEN_ALL_CORE_FT
aspirin 81 mg oral tablet: 1 tab(s) orally once a day  atorvastatin:   Colace:   esomeprazole 40 mg oral delayed release capsule: 1 cap(s) orally once a day  furosemide 20 mg oral tablet: 1 tab(s) orally 2 times a week  hydroxychloroquine 200 mg oral tablet: 1 tab(s) orally 2 times a day  methotrexate:   Omega-3:   Vitamin D3 1000 intl units oral tablet: 1 tab(s) orally once a day

## 2020-02-25 NOTE — DISCHARGE NOTE PROVIDER - HOSPITAL COURSE
86M pmh colon CA s/ R hemicolectomy (12 years ago, [facility]), hernia repair multiple SBOs without surgical intervention, recent stay at [facility] about a month ago for SBO and discharge from rehab about 2 weeks ago now p/w nausea/vomiting. Per family and patient, he has has multiple episodes of nausea and vomiting since yesterday, denies bloating, reports last BM 2 days ago and was passing flatus until today AM. Denies fevers/chills, abdominal pain.  In ED, patient was noted to be tachycardic to 110s, SBP 90-110s, Lactate 4.1 and WBC: 19, had CT scan which demonstrated pulmonary fibrosis with probable left lower lobe pneumonia and small bowel obstruction with single transition point in the right lower quadrant. Nasogastric tube was placed for decompression and the patient was admitted to the surgical service. Electrolyte abnormalities were corrected (hypokalemia, hyponatremia, hypophosphatemia and hypomagnesemia). He was started on intravenous antibiotics for treatment of community acquired pneumonia.        He had multiple episodes of delirium superimposed on dementia and removed his NGT which was replaced by the surgical team. Pulmonology was consulted regarding the pulmonary fibrosis and LLL pneumonia on CT scan. They recommended continued treatment for his pneumonia and to follow up for CT chest in six weeks with a [facility] system pulmonologist or at [facility] where his rheumatologist was (for his RA). He was seen by physical therapy which recommended discharge to rehab. He was seen by nutrition which deemed him to be underweight with severe protein calorie malnutrition. He completed his course of IV antibiotics for pneumonia.        The patient's pain was controlled by IV pain medications and then by PO pain medications. The patient was advanced to a regular diet and tolerated it well. The patient was placed on home medications. At the time of discharge, the patient was hemodynamically stable, was tolerating PO diet, was voiding urine and passing stool, was ambulating, and was comfortable with adequate pain control. The patient was instructed to follow up with Dr. Denis within 2 weeks after discharge from the hospital. The patient & family felt comfortable with discharge. The patient had no other issues.

## 2020-02-25 NOTE — DISCHARGE NOTE PROVIDER - CARE PROVIDER_API CALL
Papito Wilson)  Surgery; Surgical Critical Care  [address] [place] [address]  Phone: [phone]  Fax: [phone]  Follow Up Time:

## 2020-02-25 NOTE — PROVIDER CONTACT NOTE (OTHER) - ASSESSMENT
patient has been sleeping since he received seroquel. vitals are stable, does not seem in any distress.

## 2020-02-25 NOTE — PROGRESS NOTE ADULT - SUBJECTIVE AND OBJECTIVE BOX
Surgery Progress Note    S: Patient seen and examined. Was restless last night. O2 sat 95% on RA. Given Seroquel with good effect    O:  PHYSICAL EXAM:  Gen: NAD  LS: Respirations unlabored. CTA b/l  Card: RRR. No m/r/g.   GI: Soft. Nontender. Nondistended.   Ext: Warm, well perfused    Vital Signs Last 24 Hrs  T(C): 37.4 (25 Feb 2020 02:23), Max: 37.4 (25 Feb 2020 02:23)  T(F): 99.3 (25 Feb 2020 02:23), Max: 99.3 (25 Feb 2020 02:23)  HR: 63 (25 Feb 2020 02:23) (56 - 72)  BP: 126/73 (25 Feb 2020 02:23) (110/79 - 136/82)  BP(mean): --  RR: 20 (25 Feb 2020 02:23) (18 - 20)  SpO2: 95% (25 Feb 2020 02:23) (95% - 100%)    I&O's Detail    23 Feb 2020 07:01  -  24 Feb 2020 07:00  --------------------------------------------------------  IN:    dextrose 5% + sodium chloride 0.9%: 800 mL    dextrose 5% + sodium chloride 0.9% with potassium chloride 20 mEq/L: 700 mL    IV PiggyBack: 800 mL    Oral Fluid: 480 mL  Total IN: 2780 mL    OUT:    Voided: 1395 mL  Total OUT: 1395 mL    Total NET: 1385 mL      24 Feb 2020 07:01  -  25 Feb 2020 03:52  --------------------------------------------------------  IN:    IV PiggyBack: 300 mL    Oral Fluid: 1060 mL  Total IN: 1360 mL    OUT:    Voided: 800 mL  Total OUT: 800 mL    Total NET: 560 mL                                10.6   14.87 )-----------( 220      ( 24 Feb 2020 06:08 )             31.6       02-24    128<L>  |  93<L>  |  7   ----------------------------<  75  3.3<L>   |  23  |  0.67    Ca    7.8<L>      24 Feb 2020 06:08  Phos  1.9     02-24  Mg     1.9     02-24 B Team Surgery Progress Note    S: Patient seen and examined on AM rounds. Was restless last night overnight and received Seroquel. Sleepy and confused on AM rounds. O2 sat 95% on RA. Tolerated dinner without difficulty.  O:    PHYSICAL EXAM:  Gen: NAD, oriented to self only  LS: Respirations unlabored. CTA b/l  Card: RRR. No m/r/g.   GI: Soft. Nontender. Nondistended.   Ext: Warm, well perfused    Vital Signs Last 24 Hrs  T(C): 37.4 (25 Feb 2020 02:23), Max: 37.4 (25 Feb 2020 02:23)  T(F): 99.3 (25 Feb 2020 02:23), Max: 99.3 (25 Feb 2020 02:23)  HR: 63 (25 Feb 2020 02:23) (56 - 72)  BP: 126/73 (25 Feb 2020 02:23) (110/79 - 136/82)  BP(mean): --  RR: 20 (25 Feb 2020 02:23) (18 - 20)  SpO2: 95% (25 Feb 2020 02:23) (95% - 100%)    I&O's Detail    23 Feb 2020 07:01  -  24 Feb 2020 07:00  --------------------------------------------------------  IN:    dextrose 5% + sodium chloride 0.9%: 800 mL    dextrose 5% + sodium chloride 0.9% with potassium chloride 20 mEq/L: 700 mL    IV PiggyBack: 800 mL    Oral Fluid: 480 mL  Total IN: 2780 mL    OUT:    Voided: 1395 mL  Total OUT: 1395 mL    Total NET: 1385 mL      24 Feb 2020 07:01  -  25 Feb 2020 03:52  --------------------------------------------------------  IN:    IV PiggyBack: 300 mL    Oral Fluid: 1060 mL  Total IN: 1360 mL    OUT:    Voided: 800 mL  Total OUT: 800 mL    Total NET: 560 mL                            10.6   14.87 )-----------( 220      ( 24 Feb 2020 06:08 )             31.6       02-24    128<L>  |  93<L>  |  7   ----------------------------<  75  3.3<L>   |  23  |  0.67    Ca    7.8<L>      24 Feb 2020 06:08  Phos  1.9     02-24  Mg     1.9     02-24

## 2020-02-25 NOTE — DISCHARGE NOTE PROVIDER - NSDCCPCAREPLAN_GEN_ALL_CORE_FT
PRINCIPAL DISCHARGE DIAGNOSIS  Diagnosis: SBO (small bowel obstruction)  Assessment and Plan of Treatment: You were treated for your small bowel obstruction, which resolved. Tolerate a regular diet, regain your strength at a rehabilitation facility.      SECONDARY DISCHARGE DIAGNOSES  Diagnosis: Pneumonia  Assessment and Plan of Treatment: You completed a course of antibiotics while in the hospital for pneumonia.   Please follow up with a pulmonologist in 4-6 weeks for a repeat CT scan of your chest as you had interstitial lung disease on your CT obtained this admission.

## 2020-02-25 NOTE — PROVIDER CONTACT NOTE (OTHER) - SITUATION
Pt has not slept for 4 days, has become increasingly anxious and restless. Pt is also starting to hallucinate.  gave seroquel at 2am. patient is still sleeping, do not want to wake him, reschedule med

## 2020-02-25 NOTE — DISCHARGE NOTE PROVIDER - CONDITIONS AT DISCHARGE
Patient seen and examined by attending on the day of discharge. Stable for discharge to rehab per attending.

## 2020-02-26 PROCEDURE — 99232 SBSQ HOSP IP/OBS MODERATE 35: CPT

## 2020-02-26 RX ORDER — METHOTREXATE 2.5 MG/1
10 TABLET ORAL
Refills: 0 | Status: DISCONTINUED | OUTPATIENT
Start: 2020-02-26 | End: 2020-02-26

## 2020-02-26 RX ADMIN — ENOXAPARIN SODIUM 40 MILLIGRAM(S): 100 INJECTION SUBCUTANEOUS at 11:53

## 2020-02-26 RX ADMIN — PANTOPRAZOLE SODIUM 40 MILLIGRAM(S): 20 TABLET, DELAYED RELEASE ORAL at 05:56

## 2020-02-26 RX ADMIN — ATORVASTATIN CALCIUM 10 MILLIGRAM(S): 80 TABLET, FILM COATED ORAL at 21:37

## 2020-02-26 RX ADMIN — Medication 200 MILLIGRAM(S): at 17:01

## 2020-02-26 RX ADMIN — Medication 63.75 MILLIMOLE(S): at 11:53

## 2020-02-26 RX ADMIN — CEFTRIAXONE 100 MILLIGRAM(S): 500 INJECTION, POWDER, FOR SOLUTION INTRAMUSCULAR; INTRAVENOUS at 17:01

## 2020-02-26 RX ADMIN — Medication 200 MILLIGRAM(S): at 05:56

## 2020-02-26 RX ADMIN — Medication 81 MILLIGRAM(S): at 11:54

## 2020-02-26 RX ADMIN — Medication 1 TABLET(S): at 09:42

## 2020-02-26 RX ADMIN — Medication 3 MILLIGRAM(S): at 21:37

## 2020-02-26 NOTE — PROGRESS NOTE ADULT - ATTENDING COMMENTS
Seen and examined, chart and note reviewed, case discussed with B team residents and PA    Small bowel obstruction, resolved  a.  Without complaints  b.  Tolerating diet, denies pain, nausea or vomiting  c.  DC to rehab in am    Pneumonia  a.  To complete abx course per pulmonary\  b.  DVT prophylaxis

## 2020-02-26 NOTE — PROGRESS NOTE ADULT - ASSESSMENT
86M pmh colon CA s/p R hemicolectomy (12 years ago, [facility]), hernia repair multiple SBOs without surgical intervention, recent stay at [place] about a month ago for SBO and discharge from rehab about 2 weeks ago now p/w nausea/vomiting noted to be tachycardic to 110s, SBP 90-110s, Lactate 4.1 and WBC: 19, had CT scan which demonstrated pulmonary fibrosis with probable left lower lobe pneumonia and small bowel obstruction with single transition point in the right lower quadrant. 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     -Continue regular diet  - Melatonin at bedtime  - Antibiotic course completed   - Continue OOB ambulation  - Outpatient pulmonary follow up  - Discharge to rehab       B Team Surgery  [id number]

## 2020-02-26 NOTE — CHART NOTE - NSCHARTNOTEFT_GEN_A_CORE
Spoke with Rheumatology Fellow. Patient takes methotrexate for RA. Per Rheumatology, it is typical to hold MTX while inpatient and patient may resume medication upon follow up with his Rheumatologist upon discharge. The missed dose will not greatly affect his overall treatment course.     [facility] Team B Pager: #[phone]    Lars Weir M.D.  PGY1 - General Surgery   [facility]   Pager: [phone]

## 2020-02-26 NOTE — PROGRESS NOTE ADULT - SUBJECTIVE AND OBJECTIVE BOX
B Team Daily Progress Note    Vital Signs Last 24 Hrs  T(C): 36.4 (26 Feb 2020 05:45), Max: 36.7 (25 Feb 2020 13:54)  T(F): 97.6 (26 Feb 2020 05:45), Max: 98 (25 Feb 2020 13:54)  HR: 67 (26 Feb 2020 05:45) (57 - 99)  BP: 123/84 (26 Feb 2020 05:45) (118/84 - 132/80)  RR: 18 (26 Feb 2020 05:45) (18 - 28)  SpO2: 96% (26 Feb 2020 05:45) (96% - 97%)      SUBJECTIVE: Patient seen and examined by team on morning rounds. Patient lying comfortably in bed in NAD. Tolerating regular diet, GI function at baseline, voiding. Denies abdominal pain, SOB, palpitations, N/V/D.     General Appearance: Appears well, NAD  Neck: Supple  Chest: Equal expansion bilaterally, equal breath sounds  CV: Pulse regular presently  Abdomen: Soft, nontender, nondistended   Extremities: Grossly symmetric, SCD's in place     I&O's Summary    25 Feb 2020 07:01  -  26 Feb 2020 07:00  --------------------------------------------------------  IN: 600 mL / OUT: 0 mL / NET: 600 mL      I&O's Detail    25 Feb 2020 07:01  -  26 Feb 2020 07:00  --------------------------------------------------------  IN:    Oral Fluid: 600 mL  Total IN: 600 mL    OUT:  Total OUT: 0 mL    Total NET: 600 mL          MEDICATIONS  (STANDING):  aspirin  chewable 81 milliGRAM(s) Oral daily  atorvastatin 10 milliGRAM(s) Oral at bedtime  cefTRIAXone   IVPB 1000 milliGRAM(s) IV Intermittent every 24 hours  enoxaparin Injectable 40 milliGRAM(s) SubCutaneous daily  hydroxychloroquine 200 milliGRAM(s) Oral two times a day  pantoprazole    Tablet 40 milliGRAM(s) Oral before breakfast  potassium acid phosphate/sodium acid phosphate tablet (K-PHOS No. 2) 1 Tablet(s) Oral four times a day with meals    MEDICATIONS  (PRN):  melatonin 3 milliGRAM(s) Oral at bedtime PRN Insomnia      LABS:                        10.8   14.65 )-----------( 220      ( 25 Feb 2020 06:30 )             32.1     02-25    133<L>  |  97<L>  |  10  ----------------------------<  69<L>  3.7   |  24  |  0.75    Ca    8.3<L>      25 Feb 2020 06:30  Phos  2.4     02-25  Mg     1.8     02-25            RADIOLOGY & ADDITIONAL STUDIES:

## 2020-02-27 LAB
ANION GAP SERPL CALC-SCNC: 11 MMO/L — SIGNIFICANT CHANGE UP (ref 7–14)
BUN SERPL-MCNC: 13 MG/DL — SIGNIFICANT CHANGE UP (ref 7–23)
CALCIUM SERPL-MCNC: 8.4 MG/DL — SIGNIFICANT CHANGE UP (ref 8.4–10.5)
CHLORIDE SERPL-SCNC: 97 MMOL/L — LOW (ref 98–107)
CO2 SERPL-SCNC: 27 MMOL/L — SIGNIFICANT CHANGE UP (ref 22–31)
CREAT SERPL-MCNC: 0.7 MG/DL — SIGNIFICANT CHANGE UP (ref 0.5–1.3)
GLUCOSE SERPL-MCNC: 113 MG/DL — HIGH (ref 70–99)
HCT VFR BLD CALC: 34.4 % — LOW (ref 39–50)
HGB BLD-MCNC: 11.5 G/DL — LOW (ref 13–17)
MAGNESIUM SERPL-MCNC: 1.7 MG/DL — SIGNIFICANT CHANGE UP (ref 1.6–2.6)
MCHC RBC-ENTMCNC: 30.4 PG — SIGNIFICANT CHANGE UP (ref 27–34)
MCHC RBC-ENTMCNC: 33.4 % — SIGNIFICANT CHANGE UP (ref 32–36)
MCV RBC AUTO: 91 FL — SIGNIFICANT CHANGE UP (ref 80–100)
NRBC # FLD: 0 K/UL — SIGNIFICANT CHANGE UP (ref 0–0)
PHOSPHATE SERPL-MCNC: 2.5 MG/DL — SIGNIFICANT CHANGE UP (ref 2.5–4.5)
PLATELET # BLD AUTO: 279 K/UL — SIGNIFICANT CHANGE UP (ref 150–400)
PMV BLD: 9.7 FL — SIGNIFICANT CHANGE UP (ref 7–13)
POTASSIUM SERPL-MCNC: 3.5 MMOL/L — SIGNIFICANT CHANGE UP (ref 3.5–5.3)
POTASSIUM SERPL-SCNC: 3.5 MMOL/L — SIGNIFICANT CHANGE UP (ref 3.5–5.3)
RBC # BLD: 3.78 M/UL — LOW (ref 4.2–5.8)
RBC # FLD: 14.9 % — HIGH (ref 10.3–14.5)
SODIUM SERPL-SCNC: 135 MMOL/L — SIGNIFICANT CHANGE UP (ref 135–145)
WBC # BLD: 9.96 K/UL — SIGNIFICANT CHANGE UP (ref 3.8–10.5)
WBC # FLD AUTO: 9.96 K/UL — SIGNIFICANT CHANGE UP (ref 3.8–10.5)

## 2020-02-27 PROCEDURE — 99231 SBSQ HOSP IP/OBS SF/LOW 25: CPT

## 2020-02-27 RX ADMIN — Medication 81 MILLIGRAM(S): at 13:08

## 2020-02-27 RX ADMIN — PANTOPRAZOLE SODIUM 40 MILLIGRAM(S): 20 TABLET, DELAYED RELEASE ORAL at 05:36

## 2020-02-27 RX ADMIN — ATORVASTATIN CALCIUM 10 MILLIGRAM(S): 80 TABLET, FILM COATED ORAL at 21:46

## 2020-02-27 RX ADMIN — Medication 200 MILLIGRAM(S): at 18:28

## 2020-02-27 RX ADMIN — ENOXAPARIN SODIUM 40 MILLIGRAM(S): 100 INJECTION SUBCUTANEOUS at 13:08

## 2020-02-27 RX ADMIN — Medication 3 MILLIGRAM(S): at 21:46

## 2020-02-27 RX ADMIN — Medication 200 MILLIGRAM(S): at 05:36

## 2020-02-27 NOTE — PROGRESS NOTE ADULT - SUBJECTIVE AND OBJECTIVE BOX
B Team Surgery Progress Note     SUBJECTIVE / 24H EVENTS  Patient seen and examined on morning rounds. No acute events overnight. Patient is more awake and conversational this am, he was able to sleep throughout the evening. He continues to tolerate a diet, pass flatus and stool, and be without abdominal pain. He and his wife state understanding of plan for disposition to rehabilitation, now [facility]. He denies fevers, chills, nausea, or vomiting.     OBJECTIVE:    VITAL SIGNS:  T(C): 36.4 (02-27-20 @ 02:06), Max: 36.8 (02-26-20 @ 10:08)  HR: 61 (02-27-20 @ 02:06) (53 - 98)  BP: 117/85 (02-27-20 @ 02:06) (117/85 - 129/88)  RR: 18 (02-27-20 @ 02:06) (16 - 19)  SpO2: 98% (02-27-20 @ 02:06) (93% - 98%)      PHYSICAL EXAM:  Gen: NAD  LS: Respirations unlabored. CTA b/l  Card: RRR. No m/r/g.   GI: Soft. Nontender. Nondistended.   Ext: Warm, well perfused      02-25-20 @ 07:01  -  02-26-20 @ 07:00  --------------------------------------------------------  IN:    Oral Fluid: 600 mL  Total IN: 600 mL    OUT:  Total OUT: 0 mL    Total NET: 600 mL      02-26-20 @ 07:01  -  02-27-20 @ 05:03  --------------------------------------------------------  IN:    Oral Fluid: 570 mL  Total IN: 570 mL    OUT:    Voided: 475 mL  Total OUT: 475 mL    Total NET: 95 mL          LAB VALUES:  02-25    133<L>  |  97<L>  |  10  ----------------------------<  69<L>  3.7   |  24  |  0.75    Ca    8.3<L>      25 Feb 2020 06:30  Phos  2.4     02-25  Mg     1.8     02-25                                 10.8   14.65 )-----------( 220      ( 25 Feb 2020 06:30 )             32.1           MICROBIOLOGY:    No new microbiology data for review.     RADIOLOGY:    No new radiographic images for review.    MEDICATIONS  (STANDING):  aspirin  chewable 81 milliGRAM(s) Oral daily  atorvastatin 10 milliGRAM(s) Oral at bedtime  enoxaparin Injectable 40 milliGRAM(s) SubCutaneous daily  hydroxychloroquine 200 milliGRAM(s) Oral two times a day  pantoprazole    Tablet 40 milliGRAM(s) Oral before breakfast    MEDICATIONS  (PRN):  melatonin 3 milliGRAM(s) Oral at bedtime PRN Insomnia

## 2020-02-27 NOTE — CHART NOTE - NSCHARTNOTEFT_GEN_A_CORE
Source: Patient [X ]    Family [X ] wife     other [ X] electronic chart, RN, PCA    Diet : Diet, Soft:   Supplement Feeding Modality:  Oral  Ensure Enlive Cans or Servings Per Day:  1       Frequency:  Three Times a day (02-25-20 @ 18:04)    Patient seen for nutrition follow-up. Met with patient and wife at bedside. Appetite improving, eating lunch during encounter. Per PCA, patient only took ~50% of breakfast and 75% of 1-Ensure Enlive (providing 262 kcal, 15g protein). Patient denies any nausea/vomiting/diarrhea/constipation or difficulty chewing and swallowing. Encouraged good po intakes of nutrient and protein dense foods. Strategies to increase po intake discussed. RDN remains available, patient made aware.       Current Weight: Weight (kg): 72.6 (02-20 @ 17:40)      Pertinent Medications: atorvastatin  pantoprazole    Tablet    Pertinent Labs:  02-27 Na135 mmol/L Glu 113 mg/dL<H> K+ 3.5 mmol/L Cr  0.70 mg/dL BUN 13 mg/dL 02-27 Phos 2.5 mg/dL      Skin: stage II coccyx pressure injury noted.   No edema noted.     Estimated Needs:   [ X] no change since previous assessment  [ ] recalculated:       Previous Nutrition Diagnosis:      [ X] Malnutrition, severe     Nutrition Diagnosis is [X ] ongoing  [ ] resolved [ ] not applicable          Additional Recommendations:     1. Continue current diet order, which remains appropriate at this time.   2. Monitor weights, labs, BM's, skin integrity, p.o. intake.  3. Please Encourage po intake, assist with meals and menu selections, provide alternatives PRN.

## 2020-02-27 NOTE — PROGRESS NOTE ADULT - ASSESSMENT
86M pmh colon CA s/p R hemicolectomy (12 years ago, [facility]), hernia repair multiple SBOs without surgical intervention, recent stay at [place] about a month ago for SBO and discharge from rehab about 2 weeks ago now p/w nausea/vomiting noted to be tachycardic to 110s, SBP 90-110s, Lactate 4.1 and WBC: 19, had CT scan which demonstrated pulmonary fibrosis with probable left lower lobe pneumonia and small bowel obstruction with single transition point in the right lower quadrant. Patient with improving pulmonary symptoms, he has completed his course of ceftriaxone for ILD.     - Continue regular diet  - Melatonin at bedtime  - Continue OOB ambulation  - Outpatient Pulmonary follow up  - Outpatient Rheumatology follow up  - Dispo: Rehab       B Team Surgery  [id number]

## 2020-02-28 ENCOUNTER — TRANSCRIPTION ENCOUNTER (OUTPATIENT)
Age: 85
End: 2020-02-28

## 2020-02-28 VITALS
SYSTOLIC BLOOD PRESSURE: 110 MMHG | TEMPERATURE: 98 F | RESPIRATION RATE: 18 BRPM | DIASTOLIC BLOOD PRESSURE: 72 MMHG | OXYGEN SATURATION: 97 % | HEART RATE: 64 BPM

## 2020-02-28 PROCEDURE — 99231 SBSQ HOSP IP/OBS SF/LOW 25: CPT

## 2020-02-28 RX ORDER — FUROSEMIDE 40 MG
20 TABLET ORAL DAILY
Refills: 0 | Status: DISCONTINUED | OUTPATIENT
Start: 2020-02-28 | End: 2020-02-28

## 2020-02-28 RX ADMIN — Medication 20 MILLIGRAM(S): at 12:19

## 2020-02-28 RX ADMIN — PANTOPRAZOLE SODIUM 40 MILLIGRAM(S): 20 TABLET, DELAYED RELEASE ORAL at 05:22

## 2020-02-28 RX ADMIN — ENOXAPARIN SODIUM 40 MILLIGRAM(S): 100 INJECTION SUBCUTANEOUS at 12:17

## 2020-02-28 RX ADMIN — Medication 200 MILLIGRAM(S): at 05:22

## 2020-02-28 RX ADMIN — Medication 81 MILLIGRAM(S): at 12:16

## 2020-02-28 NOTE — PROGRESS NOTE ADULT - ASSESSMENT
86M pmh colon CA s/p R hemicolectomy (12 years ago, [facility]), hernia repair multiple SBOs without surgical intervention, recent stay at [place] about a month ago for SBO and discharge from rehab about 2 weeks ago now p/w nausea/vomiting noted to be tachycardic to 110s, SBP 90-110s, Lactate 4.1 and WBC: 19, had CT scan which demonstrated pulmonary fibrosis with probable left lower lobe pneumonia and small bowel obstruction with single transition point in the right lower quadrant. Patient with improving pulmonary symptoms, he has completed his course of ceftriaxone for ILD.     - Continue regular diet  - Melatonin at bedtime  - Continue OOB ambulation  - Outpatient Pulmonary follow up  - Outpatient Rheumatology follow up  - Dispo: Rehab       B Team Surgery  [id number] 86M pmh colon CA s/p R hemicolectomy (12 years ago, [facility]), hernia repair multiple SBOs without surgical intervention, recent stay at [place] about a month ago for SBO and discharge from rehab about 2 weeks ago now p/w nausea/vomiting noted to be tachycardic to 110s, SBP 90-110s, Lactate 4.1 and WBC: 19, had CT scan which demonstrated pulmonary fibrosis with probable left lower lobe pneumonia and small bowel obstruction with single transition point in the right lower quadrant. Patient with improving pulmonary symptoms, he has completed his course of ceftriaxone for ILD.     - Continue regular diet  - Melatonin at bedtime  - Continue OOB ambulation  - Lasix 20mg daily  - Outpatient Pulmonary follow up  - Outpatient Rheumatology follow up  - Dispo: Rehab       B Team Surgery  [id number]

## 2020-02-28 NOTE — PROGRESS NOTE ADULT - ATTENDING COMMENTS
Seen and examined, chart and note reviewed, case discussed with B team    SBO, resolved  a.  No issue  b.  Await rehab placement  c,  Continue DVT prophylaxis    Pulmonary fibrosis  a.  Daily lasix

## 2020-02-28 NOTE — DISCHARGE NOTE NURSING/CASE MANAGEMENT/SOCIAL WORK - PATIENT PORTAL LINK FT
You can access the FollowMyHealth Patient Portal offered by [facility] by registering at the following website: [email or web address]. By joining [facility]’s FollowMyHealth portal, you will also be able to view your health information using other applications (apps) compatible with our system.

## 2020-02-28 NOTE — PROGRESS NOTE ADULT - SUBJECTIVE AND OBJECTIVE BOX
Surgery Progress Note    S: Patient seen and examined. No acute events overnight. Reports tolerating diet without nausea, vomiting, passing flatus, having bowel movements, voiding without issues, have been ambulating and out of bed. Denies fever, chills, SOB, chest pain.     O:  PHYSICAL EXAM:  Gen: NAD  LS: Respirations unlabored. CTA b/l  Card: RRR. No m/r/g.   GI: Soft. Nontender. Nondistended.   Ext: Warm, well perfused      Vital Signs Last 24 Hrs  T(C): 37 (27 Feb 2020 22:53), Max: 37 (27 Feb 2020 22:53)  T(F): 98.6 (27 Feb 2020 22:53), Max: 98.6 (27 Feb 2020 22:53)  HR: 73 (27 Feb 2020 22:53) (61 - 99)  BP: 115/76 (27 Feb 2020 22:53) (112/72 - 125/85)  BP(mean): --  RR: 15 (27 Feb 2020 22:53) (15 - 18)  SpO2: 97% (27 Feb 2020 22:53) (97% - 99%)    I&O's Detail    26 Feb 2020 07:01  -  27 Feb 2020 07:00  --------------------------------------------------------  IN:    Oral Fluid: 690 mL  Total IN: 690 mL    OUT:    Voided: 475 mL  Total OUT: 475 mL    Total NET: 215 mL      27 Feb 2020 07:01  -  28 Feb 2020 01:52  --------------------------------------------------------  IN:    Oral Fluid: 480 mL  Total IN: 480 mL    OUT:    Voided: 340 mL  Total OUT: 340 mL    Total NET: 140 mL                                11.5   9.96  )-----------( 279      ( 27 Feb 2020 07:45 )             34.4       02-27    135  |  97<L>  |  13  ----------------------------<  113<H>  3.5   |  27  |  0.70    Ca    8.4      27 Feb 2020 07:48  Phos  2.5     02-27  Mg     1.7     02-27 Surgery B Team Daily Progress Note    S: Patient seen and examined by team on morning rounds. No acute events overnight. Reports tolerating diet without nausea, vomiting, passing flatus, having bowel movements, voiding without issues, have been ambulating and out of bed. Denies fever, chills, SOB, chest pain.     O:  PHYSICAL EXAM:  Gen: NAD  LS: Respirations unlabored. CTA b/l  Card: RRR. No m/r/g.   GI: Soft. Nontender. Nondistended.   Ext: Warm, well perfused      Vital Signs Last 24 Hrs  T(C): 37 (27 Feb 2020 22:53), Max: 37 (27 Feb 2020 22:53)  T(F): 98.6 (27 Feb 2020 22:53), Max: 98.6 (27 Feb 2020 22:53)  HR: 73 (27 Feb 2020 22:53) (61 - 99)  BP: 115/76 (27 Feb 2020 22:53) (112/72 - 125/85)  RR: 15 (27 Feb 2020 22:53) (15 - 18)  SpO2: 97% (27 Feb 2020 22:53) (97% - 99%)    I&O's Detail    26 Feb 2020 07:01  -  27 Feb 2020 07:00  --------------------------------------------------------  IN:    Oral Fluid: 690 mL  Total IN: 690 mL    OUT:    Voided: 475 mL  Total OUT: 475 mL    Total NET: 215 mL      27 Feb 2020 07:01  -  28 Feb 2020 01:52  --------------------------------------------------------  IN:    Oral Fluid: 480 mL  Total IN: 480 mL    OUT:    Voided: 340 mL  Total OUT: 340 mL    Total NET: 140 mL                                11.5   9.96  )-----------( 279      ( 27 Feb 2020 07:45 )             34.4       02-27    135  |  97<L>  |  13  ----------------------------<  113<H>  3.5   |  27  |  0.70    Ca    8.4      27 Feb 2020 07:48  Phos  2.5     02-27  Mg     1.7     02-27

## 2020-04-01 NOTE — PATIENT PROFILE ADULT - TOBACCO USE
Patient's wife called stating that patient had blood work completed yesterday at  and would like to know how he should proceed with medication. Please call [phone]   Never smoker

## 2020-10-02 NOTE — PROVIDER CONTACT NOTE (OTHER) - DATE AND TIME:
arthralgias and back pain. Skin: Negative for color change and pallor. Neurological: Negative for dizziness and facial asymmetry. Hematological: Negative for adenopathy. Does not bruise/bleed easily. Psychiatric/Behavioral: Negative for agitation, behavioral problems, confusion and decreased concentration. Except as noted above the remainder of the review of systems was reviewed and negative. PAST MEDICAL HISTORY     Past Medical History:   Diagnosis Date    Anxiety     Cervicalgia     Chest pain     Class 2 obesity due to excess calories without serious comorbidity with body mass index (BMI) of 36.0 to 36.9 in adult 6/26/2018    Elevated random blood glucose level     Essential hypertension, benign 1/9/2014    GERD (gastroesophageal reflux disease)     Hypercholesteremia 2/10/2016    Other and unspecified hyperlipidemia     Primary osteoarthritis of both knees 11/22/2016       SURGICAL HISTORY       Past Surgical History:   Procedure Laterality Date    TONSILLECTOMY AND ADENOIDECTOMY         CURRENT MEDICATIONS       Previous Medications    AMLODIPINE (NORVASC) 5 MG TABLET    TAKE ONE TABLET BY MOUTH DAILY    ATORVASTATIN (LIPITOR) 20 MG TABLET    TAKE ONE TABLET BY MOUTH DAILY    HYDROCODONE-ACETAMINOPHEN (NORCO) 5-325 MG PER TABLET    Take 1 tablet by mouth every 4 hours as needed for Pain for up to 3 days. Intended supply: 3 days. Take lowest dose possible to manage pain    METFORMIN (GLUCOPHAGE) 500 MG TABLET    TAKE ONE TABLET BY MOUTH TWICE A DAY WITH MEALS    NAPROXEN (NAPROSYN) 500 MG TABLET    Take 1 tablet by mouth 2 times daily    OMEGA-3 FATTY ACIDS (FISH OIL) 1200 MG CAPS    Take 1,200 mg by mouth once a week    ONDANSETRON (ZOFRAN ODT) 4 MG DISINTEGRATING TABLET    Take 1 tablet by mouth every 12 hours as needed for Nausea May Sub regular tablet (non-ODT) if insurance does not cover ODT.     PANTOPRAZOLE (PROTONIX) 40 MG TABLET    Take 1 tablet by mouth daily    TAMSULOSIN (FLOMAX) 0.4 MG CAPSULE    Take 1 capsule by mouth daily for 10 days       ALLERGIES     Patient has no known allergies. FAMILY HISTORY        Family History   Problem Relation Age of Onset   Schulte COPD Mother            Schulte Cancer Father         lung/        SOCIAL HISTORY       Social History     Socioeconomic History    Marital status: Single     Spouse name: None    Number of children: 0    Years of education: None    Highest education level: None   Occupational History    Occupation:    Social Needs    Financial resource strain: None    Food insecurity     Worry: None     Inability: None    Transportation needs     Medical: None     Non-medical: None   Tobacco Use    Smoking status: Never Smoker    Smokeless tobacco: Never Used   Substance and Sexual Activity    Alcohol use: None    Drug use: Yes     Comment: Constant marijuana    Sexual activity: None   Lifestyle    Physical activity     Days per week: None     Minutes per session: None    Stress: None   Relationships    Social connections     Talks on phone: None     Gets together: None     Attends Jain service: None     Active member of club or organization: None     Attends meetings of clubs or organizations: None     Relationship status: None    Intimate partner violence     Fear of current or ex partner: None     Emotionally abused: None     Physically abused: None     Forced sexual activity: None   Other Topics Concern    None   Social History Narrative    None       PHYSICAL EXAM       ED Triage Vitals [10/02/20 0043]   BP Temp Temp Source Pulse Resp SpO2 Height Weight   (!) 169/92 98 °F (36.7 °C) Oral 84 20 97 % -- --       Physical Exam  Vitals signs and nursing note reviewed. Constitutional:       Appearance: He is well-developed. He is ill-appearing. He is not diaphoretic. HENT:      Head: Normocephalic and atraumatic. Eyes:      General:         Right eye: No discharge.          Left eye: No 20-Feb-2020 18:20 discharge. Pupils: Pupils are equal, round, and reactive to light. Neck:      Musculoskeletal: Normal range of motion. Thyroid: No thyromegaly. Trachea: No tracheal deviation. Cardiovascular:      Rate and Rhythm: Normal rate and regular rhythm. Heart sounds: No murmur. Pulmonary:      Breath sounds: No wheezing or rales. Chest:      Chest wall: No tenderness. Abdominal:      General: There is no distension. Palpations: Abdomen is soft. There is no mass. Tenderness: There is no abdominal tenderness. There is no guarding or rebound. Musculoskeletal: Normal range of motion. General: No tenderness or deformity. Skin:     General: Skin is warm. Coloration: Skin is not pale. Findings: No erythema or rash. Neurological:      Mental Status: He is alert. Cranial Nerves: No cranial nerve deficit. Motor: No abnormal muscle tone. Coordination: Coordination normal.         DIAGNOSTIC RESULTS     EKG: All EKG's are interpreted by the Emergency Department Physician who either signs or Co-signs this chart in the absence of acardiologist.    None    RADIOLOGY:   Non-plain film images such as CT, Ultrasoundand MRI are read by the radiologist. Plain radiographic images are visualized and preliminarily interpreted by the emergency physician with the below findings:    Impression         Obstructing 3 mm calculus in the distal left ureter resulting in moderate    left hydroureteronephrosis.       ED BEDSIDE ULTRASOUND:   Performed by ED Physician - none    LABS:  Labs Reviewed   CBC WITH AUTO DIFFERENTIAL - Abnormal; Notable for the following components:       Result Value    WBC 13.3 (*)     Hematocrit 39.3 (*)     Neutrophils Absolute 11.7 (*)     Lymphocytes Absolute 0.8 (*)     All other components within normal limits    Narrative:     Performed at:  [address] [address]   Phone [phone]   BASIC METABOLIC PANEL W/ REFLEX TO MG FOR LOW K - Abnormal; Notable for the following components:    Sodium 127 (*)     Chloride 91 (*)     Glucose 184 (*)     CREATININE 0.7 (*)     All other components within normal limits    Narrative:     Performed at:  [place] [address] [address] [facility] [place] [address]   Phone [phone]   URINE RT REFLEX TO CULTURE - Abnormal; Notable for the following components:    Glucose, Ur 500 (*)     Ketones, Urine TRACE (*)     Blood, Urine MODERATE (*)     All other components within normal limits    Narrative:     Performed at:  [facility]  [address] [facility] [place] [address]   Phone [phone]   MICROSCOPIC URINALYSIS    Narrative:     Performed at:  [address] [place] [address]   Phone [phone]       All other labs were withinnormal range or not returned as of this dictation. EMERGENCY DEPARTMENT COURSE and DIFFERENTIAL DIAGNOSIS/MDM:     PMH, Surgical Hx, FH, Social Hx reviewed by myself (ETOH usage, Tobacco usage, Drug usage reviewed by myself, no pertinent Hx)- No Pertinent Hx     Old records were reviewed by me    48 yr old presents with 3mm distal ureter stone (bounce back from last night). I saw him last night and gave him urology appointment. His appointment is 130pm today with Corrine Paul NP for [facility]rology group. Stone hasnt moved per CT. Urine is not infected. His pain is not controlled and he wants to stay for pain control. He understands that he wont necessarily get stent placement or intervention in the hospital during stay but states his pain is not controlled and cannot tolerate po. Observation for distal ureter stone, intractable pain/N/V.    CRITICAL CARE TIME   Total Critical Caretime was 39 minutes, excluding separately reportable procedures.   There was a high probability of clinically significant/life threatening deterioration in the patient's condition which required my urgent intervention. PROCEDURES:  Unlessotherwise noted below, none    FINAL IMPRESSION      1. Ureteric stone    2. Intractable abdominal pain    3.  Intractable vomiting with nausea, unspecified vomiting type          DISPOSITION/PLAN   DISPOSITION Decision To Admit 10/02/2020 03:33:10 AM    (Please note that portions ofthis note were completed with a voice recognition program.  Efforts were made to edit the dictations but occasionally words are mis-transcribed.)    Primo Fam MD(electronically signed)  Attending Emergency Physician        Primo Fam MD  10/02/20 8921

## 2020-10-19 NOTE — ED ADULT TRIAGE NOTE - TEMPERATURE IN FAHRENHEIT (DEGREES F)
Patient caretaker called back   She was informed that before restarting dementia and parkinson medication Dr Sanchez Duong is wanting her to see Dr Nupur Cross   and a earlier appt has been scheduled   Caretaker notified 98

## 2020-11-04 NOTE — DISCHARGE NOTE NURSING/CASE MANAGEMENT/SOCIAL WORK - NSDCPNPNATDISSUGG_GEN_ALL_CORE
No How Severe Are Your Spot(S)?: mild What Type Of Note Output Would You Prefer (Optional)?: Standard Output What Is The Reason For Today's Visit?: Full Body Skin Examination What Is The Reason For Today's Visit? (Being Monitored For X): concerning skin lesions on an annual basis

## 2021-02-18 NOTE — PHYSICAL THERAPY INITIAL EVALUATION ADULT - ASSISTIVE DEVICE:SUPINE/SIT, REHAB EVAL
bed rails Electrodesiccation And Curettage Text: The wound bed was treated with electrodesiccation and curettage after the biopsy was performed.

## 2021-07-20 NOTE — ED ADULT TRIAGE NOTE - TEMPERATURE IN CELSIUS (DEGREES C)
36.9 Kristina Santiago PGY-2: Reassessed patient. Nontachycardic, afebrile. States feels subjectively improved. Kristina Santiago PGY-2: Spoke with patient. Feels much improved. Discussed results of bloodwork, cultures and tick panel pending. Patient agreeable to discharge with PCP follow up and return precautions. Attending Zabrina Du: pt feeling better. d/w pt results of blood work. no h/o immunosuppression. no diarrhea. abdomen soft on repeat exam. cultures sent. pt nontoxic appearing.  heart rate improved. d/w pt cultures pending. will d/c with ID follow up and close return precautoins. no neck pain or rigidity. less likely viral menigitis. no reoprts of exposures

## 2021-09-10 NOTE — ED PROVIDER NOTE - PHYSICAL EXAMINATION
Zuleyma lvm referral in epic   Gen: frail appearing elderly male in NAD  HEENT: NCAT, EOMI, no nasal discharge, mucous membranes dry  CV: RRR, +S1/S2, no M/R/G  Resp: CTAB, no W/R/R  GI: Abdomen soft non-distended, NTTP, no masses  MSK: No open wounds, no bruising, no LE edema  Neuro: A&Ox4, following commands, moving all four extremities spontaneously  Psych: appropriate mood, conversant Gen: frail appearing elderly male in NAD  HEENT: NCAT, EOMI, no nasal discharge, mucous membranes dry  CV: RRR, +S1/S2, no M/R/G  Resp: CTAB, no W/R/R  GI: Abdomen soft non-distended, NTTP, no masses  Rectal exam without active signs of melena/BRB  MSK: No open wounds, no bruising, no LE edema  Neuro: A&Ox4, following commands, moving all four extremities spontaneously  Psych: appropriate mood, conversant

## 2021-10-21 NOTE — ED ADULT TRIAGE NOTE - BP NONINVASIVE SYSTOLIC (MM HG)
118 Airway patent, TM normal bilaterally, normal appearing mouth, nose, throat, neck supple with full range of motion, no cervical adenopathy.

## 2021-10-27 NOTE — ED ADULT NURSE NOTE - TEMPERATURE IN CELSIUS (DEGREES C)
37.3 Advancement Flap (Double) Text: The defect edges were debeveled with a #15 scalpel blade.  Given the location of the defect and the proximity to free margins a double advancement flap was deemed most appropriate.  Using a sterile surgical marker, the appropriate advancement flaps were drawn incorporating the defect and placing the expected incisions within the relaxed skin tension lines where possible.    The area thus outlined was incised deep to adipose tissue with a #15 scalpel blade.  The skin margins were undermined to an appropriate distance in all directions utilizing iris scissors.

## 2022-09-19 NOTE — PATIENT PROFILE ADULT - NSPROGENSOURCEINFO_GEN_A_NUR
patient Doxycycline Pregnancy And Lactation Text: This medication is Pregnancy Category D and not consider safe during pregnancy. It is also excreted in breast milk but is considered safe for shorter treatment courses.

## 2022-10-12 NOTE — ED PROVIDER NOTE - MUSCULOSKELETAL, MLM
Refer to the Assessment tab to view/cancel completed assessment.
Spine appears normal, range of motion is not limited, no muscle or joint tenderness

## 2022-12-18 NOTE — PROGRESS NOTE ADULT - ATTENDING COMMENTS
Sent from  with influ A and r/o pneumonia  Ibuprofen at 1500  amox for OM-day 5       Seen and examined, chart and note reviewed, case discussed with B team    SBO, resolved  a.  Tolerating diet, with GI function  b.  Await discharge to [place]    Pneumonia  a.  Complete po abx course  b.  WBC normalized    Pulmonary fibrosis  a.  Daily lasix per pulmonolgy

## 2023-03-31 NOTE — PATIENT PROFILE ADULT - HAS THE PATIENT USED TOBACCO IN THE PAST 30 DAYS?
[facility] assistance needed for SNF placement. Patient is from [employer]. Placement at the [facility] if bed available. Called the [facility], [phone]. No answer. TCB.
Received a call from [address] [place] [address] at [facility] ([phone]) and stated that they are at capacity right now and will not have a bed not likely till after 10am tomorrow. Pt also need to be evaluated by physical therapy to determine if there is a skillable need, if none then they have to pay out of pocket. EDCM spoke to pt and daughter/Miranda and made aware of the above. Pt's daughter able & agreeable to stay with the patient tonight until [facility] can see her tomorrow. Humberto Quach made aware of the above as well.
No

## 2023-06-02 NOTE — PHYSICAL THERAPY INITIAL EVALUATION ADULT - REFERRING PHYSICIAN, REHAB EVAL
Judith Fox
Bed in lowest position, wheels locked, appropriate side rails in place/Call bell, personal items and telephone in reach/Instruct patient to call for assistance before getting out of bed or chair/Non-slip footwear when patient is out of bed/[employer] to call system/Physically safe environment - no spills, clutter or unnecessary equipment/Purposeful Proactive Rounding/Room/bathroom lighting operational, light cord in reach

## 2024-03-12 NOTE — ED PROVIDER NOTE - NS_EDPROVIDERDISPOUSERTYPE_ED_A_ED
Patient no showed for her virtual appointment and I called to check on patient.  Patient forgot and late canceled the appointment.  Pt asked to reschedule appointment for next Tuesday.  Kalie Chicas LCSW   Attending Attestation (For Attendings USE Only)...

## 2024-09-04 NOTE — ED PROVIDER NOTE - NS ED MD DISPO DIVISION
[FreeTextEntry1] : Clinician will review this psychotherapy session with community supervisor, Mychal Jimenez, PhD, in individual supervision.  Supervision Method: case presentation Supervision Focus: assessment, intervention LAURA

## 2024-09-11 NOTE — PATIENT PROFILE ADULT - DO YOU FEEL UNSAFE AT HOME, WORK, OR SCHOOL?
[facility] Death Summary      Attending physician:  Levar Garcia MD    Date of hospital admission: 2024    Date of death: 2024  Time of death: 12:45    Autopsy performed?  No    Did tobacco use contribute to death?  Unknown    Did alcohol use contribute to death?  Probably    Pregnancy Status    Not applicable. Male.     Manner of Death: Natural    Referred to medical examiner?  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 Transferred to ICU. He underwent EGD x 3 for intractable upper GI hemorrhage and was found to have active bleeding from prior variceal banding site.  This was repeatedly injected and clipped without obtaining adequate hemostasis.  TIPS was ruled out due to high MELD. 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  Patient continued to bleed despite above. Based on prior discussions with patient and his POA he was transitioned to comfort care measures. Patient was transitioned to inpatient hospice and started on morphine infusion.  He  and pronounced on 12:45 2024    Total time spent - 20 minutes    Levar Garcia MD  [facility]    
no

## 2025-05-21 NOTE — ED PROVIDER NOTE - NS ED ATTENDING STATEMENT MOD
I have personally performed a face to face diagnostic evaluation on this patient. I have reviewed the ACP note and agree with the history, exam and plan of care, except as noted. Awake/Alert/Cooperative

## 2025-07-15 NOTE — PATIENT PROFILE ADULT - NSPROPTRIGHTNOTIFY_GEN_A_NUR
Patient was called to schedule an appointment in Behavioral Health, per Service to  Order placed by provider.    Patient did not answer call.  A voicemail was left for the patient.     Patient: Has My Chart. Will send letter asking patient to call to schedule visit.                      declines